# Patient Record
Sex: FEMALE | Race: WHITE | NOT HISPANIC OR LATINO | Employment: FULL TIME | URBAN - METROPOLITAN AREA
[De-identification: names, ages, dates, MRNs, and addresses within clinical notes are randomized per-mention and may not be internally consistent; named-entity substitution may affect disease eponyms.]

---

## 2017-02-14 ENCOUNTER — APPOINTMENT (OUTPATIENT)
Dept: LAB | Facility: HOSPITAL | Age: 67
End: 2017-02-14

## 2017-02-14 ENCOUNTER — APPOINTMENT (OUTPATIENT)
Dept: ONCOLOGY | Facility: CLINIC | Age: 67
End: 2017-02-14

## 2017-02-17 ENCOUNTER — LAB (OUTPATIENT)
Dept: LAB | Facility: HOSPITAL | Age: 67
End: 2017-02-17

## 2017-02-17 ENCOUNTER — OFFICE VISIT (OUTPATIENT)
Dept: ONCOLOGY | Facility: CLINIC | Age: 67
End: 2017-02-17

## 2017-02-17 VITALS
HEIGHT: 61 IN | SYSTOLIC BLOOD PRESSURE: 140 MMHG | OXYGEN SATURATION: 95 % | HEART RATE: 100 BPM | WEIGHT: 189.8 LBS | RESPIRATION RATE: 16 BRPM | BODY MASS INDEX: 35.83 KG/M2 | DIASTOLIC BLOOD PRESSURE: 70 MMHG | TEMPERATURE: 98.1 F

## 2017-02-17 DIAGNOSIS — F32.9 REACTIVE DEPRESSION: ICD-10-CM

## 2017-02-17 DIAGNOSIS — C50.512 MALIGNANT NEOPLASM OF LOWER-OUTER QUADRANT OF LEFT FEMALE BREAST (HCC): ICD-10-CM

## 2017-02-17 DIAGNOSIS — C79.2 CARCINOMA OF LEFT BREAST METASTATIC TO SKIN (HCC): ICD-10-CM

## 2017-02-17 DIAGNOSIS — C50.912 CARCINOMA OF LEFT BREAST METASTATIC TO SKIN (HCC): ICD-10-CM

## 2017-02-17 DIAGNOSIS — C50.912 CARCINOMA OF LEFT BREAST METASTATIC TO SKIN (HCC): Primary | ICD-10-CM

## 2017-02-17 DIAGNOSIS — C79.2 CARCINOMA OF LEFT BREAST METASTATIC TO SKIN (HCC): Primary | ICD-10-CM

## 2017-02-17 LAB
ALBUMIN SERPL-MCNC: 3.9 G/DL (ref 3.5–5.2)
ALBUMIN/GLOB SERPL: 1.3 G/DL (ref 1.1–2.4)
ALP SERPL-CCNC: 98 U/L (ref 38–116)
ALT SERPL W P-5'-P-CCNC: 20 U/L (ref 0–33)
ANION GAP SERPL CALCULATED.3IONS-SCNC: 12.9 MMOL/L
AST SERPL-CCNC: 21 U/L (ref 0–32)
BASOPHILS # BLD AUTO: 0.05 10*3/MM3 (ref 0–0.1)
BASOPHILS NFR BLD AUTO: 0.9 % (ref 0–1.1)
BILIRUB SERPL-MCNC: 0.3 MG/DL (ref 0.1–1.2)
BUN BLD-MCNC: 17 MG/DL (ref 6–20)
BUN/CREAT SERPL: 23.9 (ref 7.3–30)
CALCIUM SPEC-SCNC: 9.1 MG/DL (ref 8.5–10.2)
CANCER AG15-3 SERPL-ACNC: 20.1 U/ML
CHLORIDE SERPL-SCNC: 98 MMOL/L (ref 98–107)
CO2 SERPL-SCNC: 28.1 MMOL/L (ref 22–29)
CREAT BLD-MCNC: 0.71 MG/DL (ref 0.6–1.1)
DEPRECATED RDW RBC AUTO: 42.9 FL (ref 37–49)
EOSINOPHIL # BLD AUTO: 0.09 10*3/MM3 (ref 0–0.36)
EOSINOPHIL NFR BLD AUTO: 1.7 % (ref 1–5)
ERYTHROCYTE [DISTWIDTH] IN BLOOD BY AUTOMATED COUNT: 13.1 % (ref 11.7–14.5)
GFR SERPL CREATININE-BSD FRML MDRD: 82 ML/MIN/1.73
GLOBULIN UR ELPH-MCNC: 3.1 GM/DL (ref 1.8–3.5)
GLUCOSE BLD-MCNC: 267 MG/DL (ref 74–124)
HCT VFR BLD AUTO: 37.1 % (ref 34–45)
HGB BLD-MCNC: 12.6 G/DL (ref 11.5–14.9)
IMM GRANULOCYTES # BLD: 0.02 10*3/MM3 (ref 0–0.03)
IMM GRANULOCYTES NFR BLD: 0.4 % (ref 0–0.5)
LYMPHOCYTES # BLD AUTO: 1.11 10*3/MM3 (ref 1–3.5)
LYMPHOCYTES NFR BLD AUTO: 20.7 % (ref 20–49)
MCH RBC QN AUTO: 30.7 PG (ref 27–33)
MCHC RBC AUTO-ENTMCNC: 34 G/DL (ref 32–35)
MCV RBC AUTO: 90.5 FL (ref 83–97)
MONOCYTES # BLD AUTO: 0.4 10*3/MM3 (ref 0.25–0.8)
MONOCYTES NFR BLD AUTO: 7.5 % (ref 4–12)
NEUTROPHILS # BLD AUTO: 3.69 10*3/MM3 (ref 1.5–7)
NEUTROPHILS NFR BLD AUTO: 68.8 % (ref 39–75)
NRBC BLD MANUAL-RTO: 0 /100 WBC (ref 0–0)
PLATELET # BLD AUTO: 171 10*3/MM3 (ref 150–375)
PMV BLD AUTO: 9.4 FL (ref 8.9–12.1)
POTASSIUM BLD-SCNC: 4 MMOL/L (ref 3.5–4.7)
PROT SERPL-MCNC: 7 G/DL (ref 6.3–8)
RBC # BLD AUTO: 4.1 10*6/MM3 (ref 3.9–5)
SODIUM BLD-SCNC: 139 MMOL/L (ref 134–145)
WBC NRBC COR # BLD: 5.36 10*3/MM3 (ref 4–10)

## 2017-02-17 PROCEDURE — 86300 IMMUNOASSAY TUMOR CA 15-3: CPT | Performed by: INTERNAL MEDICINE

## 2017-02-17 PROCEDURE — 36415 COLL VENOUS BLD VENIPUNCTURE: CPT | Performed by: INTERNAL MEDICINE

## 2017-02-17 PROCEDURE — 99213 OFFICE O/P EST LOW 20 MIN: CPT | Performed by: INTERNAL MEDICINE

## 2017-02-17 PROCEDURE — 80053 COMPREHEN METABOLIC PANEL: CPT | Performed by: INTERNAL MEDICINE

## 2017-02-17 PROCEDURE — 85025 COMPLETE CBC W/AUTO DIFF WBC: CPT | Performed by: INTERNAL MEDICINE

## 2017-02-17 NOTE — PROGRESS NOTES
Subjective     REASONS FOR FOLLOWUP:   1. Chest wall recurrence of breast cancer, ER/OK positive, HER-2 negative, grade 2 with no evidence of distant spread. Arimidex started in 2014.   2. T1cN0, grade 1, ER/OK positive breast cancer treated with CMF and 5 years of tamoxifen in .   3. Moderate fatigue from Arimidex.   4. Response clinically and by CT scan on 2015.   5. Faslodex added to Arimidex in 2015 to optimize response for surgery.   6. Decision made to forego surgery and do radiation, continue Arimidex in 2015.        History of Present Illness  patient is a 65-year-old lady with a locally recurrent breast cancer 19 years from diagnosis currently on Arimidex with fair tolerance.  She completed radiation a little over 10 months ago and she is here today for follow-up.  She is exercising and dieting and her depression and attitude are much better.  She is tolerating Arimidex without any problems.   Her tumor markers remain normal except for mild thrombocytopenia she has no lab abnormalities currently.  She has no cough or symptoms from her radiation scarring in the left lung apex.    PAST MEDICAL HISTORY: Significant for diabetes type 2, hypercholesterolemia and peripheral mariaelena ropathy from her diabetes for which he is on very low-dose Neurontin. She has had no heart attack, strokes or blood clots, no peptic ulcer disease, infectious mononucleosis, hepatitis, thyroid problems, or anemia.   PAST SURGICAL HISTORY: None except left breast mastectomy .     OB/GYN HISTORY: Menarche was at age 12, menopause in her late 40s induced by CMS chemotherapy. She is  3, para 2 with one miscarriage. First childbirth was at age 25. She did not breast-feed. Never took hormonal replacement.     ONCOLOGIC HISTORY: History of previous dates can be found in a separate document.   The patient was seen on 2015 after getting a 2nd opinion of Dr. Louis Brooks and seeing her surgeon in [place]  again. Her circulating tumor cells were present and we felt this argued against doing surgery and we opted to do radiation to the chest wall. Continue Arimidex for the time being and for progression switch to Faslodex and Ibrance. CT scans of the chest, abdomen, pelvis, and bone scan dated 10/07/2015 showed no evidence of systemic disease and just chest wall disease, which has all respon ded to her hormonal therapy and fatty liver.  Patient seen on 2016 after radiation with a PET scan that shows some radiation lung damage which is PET positive with a low SUV and no other signs of metastatic disease.  Arimidex continued    SOCIAL HISTORY: She is . 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 She is a nondrinker.     FAMILY HISTORY: Father  at 59 o f an MI. Mother at 68 of lung cancer and was a smoker. She has paternal grandmother with breast cancer at age 47, a paternal aunt with breast cancer at age 59 and pancreatic cancer at 88. She has a paternal first cousin with breast cancer at 44, another p a ternal cousin with breast cancer at 44, another cousin with breast cancer at 61, and another paternal first cousin with breast cancer at 66. BRCA testing was done on the 44-year-old cousin and was negative. No history of ovarian cancer. She has a paterna l first cousin with colon cancer.     Review of Systems   Constitutional: Positive for fatigue.   Neurological: Positive for numbness.   All other systems reviewed and are negative.     A comprehensive 14 point review of systems was performed and was negative except as mentioned.    Current Outpatient Prescriptions on File Prior to Visit   Medication Sig Dispense Refill   • anastrozole (ARIMIDEX) 1 MG tablet Take 1 tablet by mouth Daily. 90 tablet 2   • aspirin 81 MG chewable tablet Chew 81 mg daily.     • gabapentin (NEURONTIN) 100 MG capsule Take 100 mg by mouth every day.     • lisinopril (PRINIVIL,ZESTRIL) 10 MG  "tablet      • metFORMIN (GLUCOPHAGE) 500 MG tablet Take 500 mg by mouth 2 (two) times a day with meals.     • rosuvastatin (CRESTOR) 20 MG tablet Take 20 mg by mouth daily.       No current facility-administered medications on file prior to visit.          ALLERGIES:  No Known Allergies    Objective      Vitals:    02/17/17 1448   BP: 140/70   Pulse: 100   Resp: 16   Temp: 98.1 °F (36.7 °C)   TempSrc: Oral   SpO2: 95%   Weight: 189 lb 12.8 oz (86.1 kg)   Height: 61.42\" (156 cm)   PainSc: 0-No pain     Current Status 2/17/2017   ECOG score 0       Physical Exam    GENERAL:  Well-developed, well-nourished in no acute distress.   SKIN:  Warm, dry without rashes, purpura or petechiae.  HEAD:  Normocephalic.  EYES:  Pupils equal, round and reactive to light.  EOMs intact.  Conjunctivae normal.  EARS:  Hearing intact.  NOSE:  Septum midline.  No excoriations or nasal discharge.  MOUTH:  Tongue is well-papillated; no stomatitis or ulcers.  Lips normal.  THROAT:  Oropharynx without lesions or exudates.  NECK:  Supple with good range of motion; no thyromegaly or masses, no JVD.  LYMPHATICS:  No cervical, supraclavicular, axillary or inguinal adenopathy.  CHEST:  Lungs clear to percussion and auscultation. Good airflow.  BREASTS: Right breast with an inverted nipple and no masses-left chest wall with significant radiation skin changes and no residual masses appreciated  CARDIAC:  Regular rate and rhythm without murmurs, rubs or gallops. Normal S1,S2.  ABDOMEN:  Soft, nontender with no organomegaly or masses.  EXTREMITIES:  No clubbing, cyanosis or edema.  NEUROLOGICAL:  Cranial Nerves II-XII grossly intact.  No focal neurological deficits.  PSYCHIATRIC:  Normal affect and mood.        RECENT LABS:  Hematology WBC   Date Value Ref Range Status   02/17/2017 5.36 4.00 - 10.00 10*3/mm3 Final     RBC   Date Value Ref Range Status   02/17/2017 4.10 3.90 - 5.00 10*6/mm3 Final     HEMOGLOBIN   Date Value Ref Range Status   02/17/2017 " 12.6 11.5 - 14.9 g/dL Final     HEMATOCRIT   Date Value Ref Range Status   02/17/2017 37.1 34.0 - 45.0 % Final     PLATELETS   Date Value Ref Range Status   02/17/2017 171 150 - 375 10*3/mm3 Final      C  Assessment/Plan   1.  Recurrent breast cancer to the left chest wall only-treated with aromatase inhibitors plus radiation with a negative PET scan in 6/16  2.  Anxiety and depression improved  3.  Significant weight gain with hormonal blockade currently improving with diet and exercise  4   elevated circulating tumor cells significance not known  5.  Numbness in the left upper arm and intrascapular regions?  Related to radiation-better    Plan  Return in 3 months with CAT scans and a bone scan and CA-15-3 for review                2/17/2017      CC:

## 2017-04-12 ENCOUNTER — OFFICE (AMBULATORY)
Dept: URBAN - METROPOLITAN AREA CLINIC 75 | Facility: CLINIC | Age: 67
End: 2017-04-12

## 2017-04-12 VITALS
WEIGHT: 188 LBS | DIASTOLIC BLOOD PRESSURE: 94 MMHG | HEART RATE: 91 BPM | HEIGHT: 62 IN | SYSTOLIC BLOOD PRESSURE: 162 MMHG

## 2017-04-12 DIAGNOSIS — R19.5 OTHER FECAL ABNORMALITIES: ICD-10-CM

## 2017-04-12 DIAGNOSIS — Z86.010 PERSONAL HISTORY OF COLONIC POLYPS: ICD-10-CM

## 2017-04-12 PROCEDURE — 99203 OFFICE O/P NEW LOW 30 MIN: CPT | Performed by: INTERNAL MEDICINE

## 2017-05-01 VITALS
SYSTOLIC BLOOD PRESSURE: 156 MMHG | TEMPERATURE: 97.4 F | RESPIRATION RATE: 20 BRPM | HEART RATE: 78 BPM | HEART RATE: 73 BPM | SYSTOLIC BLOOD PRESSURE: 130 MMHG | HEART RATE: 77 BPM | DIASTOLIC BLOOD PRESSURE: 67 MMHG | HEART RATE: 74 BPM | DIASTOLIC BLOOD PRESSURE: 69 MMHG | SYSTOLIC BLOOD PRESSURE: 150 MMHG | HEART RATE: 80 BPM | RESPIRATION RATE: 13 BRPM | OXYGEN SATURATION: 95 % | DIASTOLIC BLOOD PRESSURE: 62 MMHG | RESPIRATION RATE: 11 BRPM | SYSTOLIC BLOOD PRESSURE: 164 MMHG | SYSTOLIC BLOOD PRESSURE: 135 MMHG | RESPIRATION RATE: 14 BRPM | OXYGEN SATURATION: 96 % | RESPIRATION RATE: 12 BRPM | DIASTOLIC BLOOD PRESSURE: 64 MMHG | RESPIRATION RATE: 17 BRPM | RESPIRATION RATE: 16 BRPM | RESPIRATION RATE: 25 BRPM | DIASTOLIC BLOOD PRESSURE: 76 MMHG | OXYGEN SATURATION: 99 % | DIASTOLIC BLOOD PRESSURE: 66 MMHG | DIASTOLIC BLOOD PRESSURE: 61 MMHG | DIASTOLIC BLOOD PRESSURE: 92 MMHG | DIASTOLIC BLOOD PRESSURE: 68 MMHG | HEART RATE: 83 BPM | WEIGHT: 184 LBS | DIASTOLIC BLOOD PRESSURE: 99 MMHG | SYSTOLIC BLOOD PRESSURE: 137 MMHG | RESPIRATION RATE: 15 BRPM | HEIGHT: 62 IN | OXYGEN SATURATION: 94 % | OXYGEN SATURATION: 97 % | SYSTOLIC BLOOD PRESSURE: 163 MMHG | HEART RATE: 79 BPM | HEART RATE: 76 BPM | SYSTOLIC BLOOD PRESSURE: 138 MMHG | HEART RATE: 68 BPM | SYSTOLIC BLOOD PRESSURE: 162 MMHG | SYSTOLIC BLOOD PRESSURE: 149 MMHG | SYSTOLIC BLOOD PRESSURE: 157 MMHG | HEART RATE: 71 BPM | HEART RATE: 69 BPM | DIASTOLIC BLOOD PRESSURE: 70 MMHG | SYSTOLIC BLOOD PRESSURE: 131 MMHG | TEMPERATURE: 98 F

## 2017-05-02 ENCOUNTER — OFFICE (AMBULATORY)
Dept: URBAN - METROPOLITAN AREA CLINIC 64 | Facility: CLINIC | Age: 67
End: 2017-05-02

## 2017-05-02 ENCOUNTER — AMBULATORY SURGICAL CENTER (AMBULATORY)
Dept: URBAN - METROPOLITAN AREA SURGERY 17 | Facility: SURGERY | Age: 67
End: 2017-05-02

## 2017-05-02 ENCOUNTER — TRANSCRIBE ORDERS (OUTPATIENT)
Dept: ADMINISTRATIVE | Facility: HOSPITAL | Age: 67
End: 2017-05-02

## 2017-05-02 DIAGNOSIS — D12.5 BENIGN NEOPLASM OF SIGMOID COLON: ICD-10-CM

## 2017-05-02 DIAGNOSIS — K64.8 OTHER HEMORRHOIDS: ICD-10-CM

## 2017-05-02 DIAGNOSIS — D12.4 BENIGN NEOPLASM OF DESCENDING COLON: ICD-10-CM

## 2017-05-02 DIAGNOSIS — K63.5 POLYP OF COLON: ICD-10-CM

## 2017-05-02 DIAGNOSIS — D12.0 BENIGN NEOPLASM OF CECUM: ICD-10-CM

## 2017-05-02 DIAGNOSIS — R19.5 OTHER FECAL ABNORMALITIES: ICD-10-CM

## 2017-05-02 DIAGNOSIS — R19.5 OCCULT BLOOD POSITIVE STOOL: Primary | ICD-10-CM

## 2017-05-02 DIAGNOSIS — K57.30 DIVERTICULOSIS OF LARGE INTESTINE WITHOUT PERFORATION OR ABS: ICD-10-CM

## 2017-05-02 DIAGNOSIS — Z86.010 PERSONAL HISTORY OF COLONIC POLYPS: ICD-10-CM

## 2017-05-02 DIAGNOSIS — Z86.010 HX OF COLONIC POLYPS: ICD-10-CM

## 2017-05-02 PROBLEM — D12.3 BENIGN NEOPLASM OF TRANSVERSE COLON: Status: ACTIVE | Noted: 2017-05-02

## 2017-05-02 LAB
GI HISTOLOGY: A. UNSPECIFIED: (no result)
GI HISTOLOGY: B. UNSPECIFIED: (no result)
GI HISTOLOGY: C. SELECT: (no result)
GI HISTOLOGY: D. SELECT: (no result)
GI HISTOLOGY: PDF REPORT: (no result)

## 2017-05-02 PROCEDURE — 45385 COLONOSCOPY W/LESION REMOVAL: CPT | Performed by: INTERNAL MEDICINE

## 2017-05-02 PROCEDURE — 88305 TISSUE EXAM BY PATHOLOGIST: CPT | Performed by: INTERNAL MEDICINE

## 2017-05-02 RX ADMIN — PROPOFOL 50 MG: 10 INJECTION, EMULSION INTRAVENOUS at 07:57

## 2017-05-02 RX ADMIN — PROPOFOL 25 MG: 10 INJECTION, EMULSION INTRAVENOUS at 08:14

## 2017-05-02 RX ADMIN — PROPOFOL 25 MG: 10 INJECTION, EMULSION INTRAVENOUS at 08:03

## 2017-05-02 RX ADMIN — PROPOFOL 25 MG: 10 INJECTION, EMULSION INTRAVENOUS at 08:07

## 2017-05-02 RX ADMIN — PROPOFOL 25 MG: 10 INJECTION, EMULSION INTRAVENOUS at 08:05

## 2017-05-02 RX ADMIN — PROPOFOL 100 MG: 10 INJECTION, EMULSION INTRAVENOUS at 07:55

## 2017-05-02 RX ADMIN — LIDOCAINE HYDROCHLORIDE 50 MG: 10 INJECTION, SOLUTION EPIDURAL; INFILTRATION; INTRACAUDAL; PERINEURAL at 07:55

## 2017-05-02 RX ADMIN — PROPOFOL 25 MG: 10 INJECTION, EMULSION INTRAVENOUS at 08:01

## 2017-05-02 RX ADMIN — PROPOFOL 50 MG: 10 INJECTION, EMULSION INTRAVENOUS at 07:59

## 2017-05-02 RX ADMIN — PROPOFOL 25 MG: 10 INJECTION, EMULSION INTRAVENOUS at 08:09

## 2017-05-02 RX ADMIN — PROPOFOL 25 MG: 10 INJECTION, EMULSION INTRAVENOUS at 08:11

## 2017-05-03 ENCOUNTER — HOSPITAL ENCOUNTER (OUTPATIENT)
Dept: NUCLEAR MEDICINE | Facility: HOSPITAL | Age: 67
Discharge: HOME | End: 2017-05-03
Attending: INTERNAL MEDICINE

## 2017-05-03 ENCOUNTER — HOSPITAL ENCOUNTER (OUTPATIENT)
Dept: CT IMAGING | Facility: HOSPITAL | Age: 67
Discharge: HOME | End: 2017-05-03
Attending: INTERNAL MEDICINE | Admitting: INTERNAL MEDICINE

## 2017-05-03 DIAGNOSIS — C50.512 MALIGNANT NEOPLASM OF LOWER-OUTER QUADRANT OF LEFT FEMALE BREAST (HCC): ICD-10-CM

## 2017-05-03 DIAGNOSIS — C79.2 CARCINOMA OF LEFT BREAST METASTATIC TO SKIN (HCC): ICD-10-CM

## 2017-05-03 DIAGNOSIS — C50.912 CARCINOMA OF LEFT BREAST METASTATIC TO SKIN (HCC): ICD-10-CM

## 2017-05-03 DIAGNOSIS — F32.9 REACTIVE DEPRESSION: ICD-10-CM

## 2017-05-03 LAB
ALBUMIN SERPL-MCNC: 4 G/DL (ref 3.5–5.2)
ALBUMIN/GLOB SERPL: 1.1 G/DL
ALP SERPL-CCNC: 96 U/L (ref 39–117)
ALT SERPL W P-5'-P-CCNC: 27 U/L (ref 1–33)
ANION GAP SERPL CALCULATED.3IONS-SCNC: 15.6 MMOL/L
AST SERPL-CCNC: 31 U/L (ref 1–32)
BILIRUB SERPL-MCNC: 0.3 MG/DL (ref 0.1–1.2)
BUN BLD-MCNC: 16 MG/DL (ref 8–23)
BUN/CREAT SERPL: 27.6 (ref 7–25)
CALCIUM SPEC-SCNC: 9.2 MG/DL (ref 8.6–10.5)
CHLORIDE SERPL-SCNC: 102 MMOL/L (ref 98–107)
CO2 SERPL-SCNC: 23.4 MMOL/L (ref 22–29)
CREAT BLD-MCNC: 0.58 MG/DL (ref 0.57–1)
CREAT BLDA-MCNC: 0.6 MG/DL (ref 0.6–1.3)
GFR SERPL CREATININE-BSD FRML MDRD: 103 ML/MIN/1.73
GLOBULIN UR ELPH-MCNC: 3.8 GM/DL
GLUCOSE BLD-MCNC: 135 MG/DL (ref 65–99)
POTASSIUM BLD-SCNC: 4.6 MMOL/L (ref 3.5–5.2)
PROT SERPL-MCNC: 7.8 G/DL (ref 6–8.5)
SODIUM BLD-SCNC: 141 MMOL/L (ref 136–145)

## 2017-05-03 PROCEDURE — 86300 IMMUNOASSAY TUMOR CA 15-3: CPT | Performed by: INTERNAL MEDICINE

## 2017-05-03 PROCEDURE — 0 IOPAMIDOL 61 % SOLUTION: Performed by: INTERNAL MEDICINE

## 2017-05-03 PROCEDURE — A9503 TC99M MEDRONATE: HCPCS | Performed by: INTERNAL MEDICINE

## 2017-05-03 PROCEDURE — 82565 ASSAY OF CREATININE: CPT

## 2017-05-03 PROCEDURE — 78306 BONE IMAGING WHOLE BODY: CPT

## 2017-05-03 PROCEDURE — 0 TECHNETIUM MEDRONATE KIT: Performed by: INTERNAL MEDICINE

## 2017-05-03 PROCEDURE — 71260 CT THORAX DX C+: CPT

## 2017-05-03 PROCEDURE — 74177 CT ABD & PELVIS W/CONTRAST: CPT

## 2017-05-03 PROCEDURE — 80053 COMPREHEN METABOLIC PANEL: CPT | Performed by: INTERNAL MEDICINE

## 2017-05-03 RX ORDER — TC 99M MEDRONATE 20 MG/10ML
23.2 INJECTION, POWDER, LYOPHILIZED, FOR SOLUTION INTRAVENOUS
Status: COMPLETED | OUTPATIENT
Start: 2017-05-03 | End: 2017-05-03

## 2017-05-03 RX ADMIN — IOPAMIDOL 85 ML: 612 INJECTION, SOLUTION INTRAVENOUS at 09:00

## 2017-05-03 RX ADMIN — Medication 23.2 MILLICURIE: at 07:45

## 2017-05-04 LAB — CANCER AG15-3 SERPL-ACNC: 21.5 U/ML (ref 0–25)

## 2017-05-12 ENCOUNTER — APPOINTMENT (OUTPATIENT)
Dept: LAB | Facility: HOSPITAL | Age: 67
End: 2017-05-12

## 2017-05-12 ENCOUNTER — OFFICE VISIT (OUTPATIENT)
Dept: ONCOLOGY | Facility: CLINIC | Age: 67
End: 2017-05-12

## 2017-05-12 VITALS
BODY MASS INDEX: 36.63 KG/M2 | SYSTOLIC BLOOD PRESSURE: 138 MMHG | HEART RATE: 89 BPM | WEIGHT: 194 LBS | HEIGHT: 61 IN | TEMPERATURE: 98.8 F | DIASTOLIC BLOOD PRESSURE: 68 MMHG | RESPIRATION RATE: 12 BRPM | OXYGEN SATURATION: 98 %

## 2017-05-12 DIAGNOSIS — C79.2 CARCINOMA OF LEFT BREAST METASTATIC TO SKIN (HCC): ICD-10-CM

## 2017-05-12 DIAGNOSIS — C50.512 MALIGNANT NEOPLASM OF LOWER-OUTER QUADRANT OF LEFT FEMALE BREAST (HCC): ICD-10-CM

## 2017-05-12 DIAGNOSIS — F32.9 REACTIVE DEPRESSION: Primary | ICD-10-CM

## 2017-05-12 DIAGNOSIS — C50.912 CARCINOMA OF LEFT BREAST METASTATIC TO SKIN (HCC): ICD-10-CM

## 2017-05-12 PROCEDURE — 99214 OFFICE O/P EST MOD 30 MIN: CPT | Performed by: INTERNAL MEDICINE

## 2017-05-12 RX ORDER — LISINOPRIL 20 MG/1
TABLET ORAL
Refills: 2 | COMMUNITY
Start: 2017-03-31 | End: 2017-08-10

## 2017-05-17 ENCOUNTER — HOSPITAL ENCOUNTER (OUTPATIENT)
Dept: GENERAL RADIOLOGY | Facility: HOSPITAL | Age: 67
Discharge: HOME | End: 2017-05-17
Admitting: INTERNAL MEDICINE

## 2017-05-17 DIAGNOSIS — Z86.010 HX OF COLONIC POLYPS: ICD-10-CM

## 2017-05-17 DIAGNOSIS — R19.5 OCCULT BLOOD POSITIVE STOOL: ICD-10-CM

## 2017-05-17 PROCEDURE — 74249: CPT

## 2017-05-25 VITALS
OXYGEN SATURATION: 96 % | HEART RATE: 67 BPM | HEART RATE: 78 BPM | TEMPERATURE: 96.8 F | OXYGEN SATURATION: 100 % | RESPIRATION RATE: 15 BRPM | SYSTOLIC BLOOD PRESSURE: 142 MMHG | SYSTOLIC BLOOD PRESSURE: 184 MMHG | OXYGEN SATURATION: 92 % | OXYGEN SATURATION: 95 % | OXYGEN SATURATION: 97 % | HEART RATE: 65 BPM | HEART RATE: 80 BPM | RESPIRATION RATE: 14 BRPM | DIASTOLIC BLOOD PRESSURE: 68 MMHG | HEART RATE: 77 BPM | DIASTOLIC BLOOD PRESSURE: 89 MMHG | HEART RATE: 76 BPM | HEART RATE: 71 BPM | TEMPERATURE: 96.2 F | RESPIRATION RATE: 21 BRPM | SYSTOLIC BLOOD PRESSURE: 149 MMHG | DIASTOLIC BLOOD PRESSURE: 78 MMHG | SYSTOLIC BLOOD PRESSURE: 159 MMHG | RESPIRATION RATE: 16 BRPM | SYSTOLIC BLOOD PRESSURE: 154 MMHG | RESPIRATION RATE: 17 BRPM | DIASTOLIC BLOOD PRESSURE: 82 MMHG | SYSTOLIC BLOOD PRESSURE: 138 MMHG | WEIGHT: 184 LBS | OXYGEN SATURATION: 94 % | DIASTOLIC BLOOD PRESSURE: 71 MMHG | HEIGHT: 62 IN

## 2017-05-30 ENCOUNTER — AMBULATORY SURGICAL CENTER (AMBULATORY)
Dept: URBAN - METROPOLITAN AREA SURGERY 17 | Facility: SURGERY | Age: 67
End: 2017-05-30

## 2017-05-30 ENCOUNTER — OFFICE (AMBULATORY)
Dept: URBAN - METROPOLITAN AREA CLINIC 64 | Facility: CLINIC | Age: 67
End: 2017-05-30

## 2017-05-30 DIAGNOSIS — K29.70 GASTRITIS, UNSPECIFIED, WITHOUT BLEEDING: ICD-10-CM

## 2017-05-30 DIAGNOSIS — K29.50 UNSPECIFIED CHRONIC GASTRITIS WITHOUT BLEEDING: ICD-10-CM

## 2017-05-30 DIAGNOSIS — B96.81 HELICOBACTER PYLORI [H. PYLORI] AS THE CAUSE OF DISEASES CLA: ICD-10-CM

## 2017-05-30 DIAGNOSIS — K29.80 DUODENITIS WITHOUT BLEEDING: ICD-10-CM

## 2017-05-30 DIAGNOSIS — K44.9 DIAPHRAGMATIC HERNIA WITHOUT OBSTRUCTION OR GANGRENE: ICD-10-CM

## 2017-05-30 DIAGNOSIS — R93.3 ABNORMAL FINDINGS ON DIAGNOSTIC IMAGING OF OTHER PARTS OF DI: ICD-10-CM

## 2017-05-30 LAB
GI HISTOLOGY: A. UNSPECIFIED: (no result)
GI HISTOLOGY: B. UNSPECIFIED: (no result)
GI HISTOLOGY: C. UNSPECIFIED: (no result)
GI HISTOLOGY: D. SELECT: (no result)
GI HISTOLOGY: E. UNSPECIFIED: (no result)
GI HISTOLOGY: PDF REPORT: (no result)

## 2017-05-30 PROCEDURE — 43239 EGD BIOPSY SINGLE/MULTIPLE: CPT | Performed by: INTERNAL MEDICINE

## 2017-05-30 PROCEDURE — 88305 TISSUE EXAM BY PATHOLOGIST: CPT | Performed by: INTERNAL MEDICINE

## 2017-05-30 RX ORDER — PANTOPRAZOLE SODIUM 40 MG/1
40 TABLET, DELAYED RELEASE ORAL
Qty: 30 | Refills: 11 | Status: ACTIVE
Start: 2017-05-30

## 2017-05-30 RX ADMIN — PROPOFOL 100 MG: 10 INJECTION, EMULSION INTRAVENOUS at 09:01

## 2017-05-30 RX ADMIN — PROPOFOL 25 MG: 10 INJECTION, EMULSION INTRAVENOUS at 09:10

## 2017-05-30 RX ADMIN — PROPOFOL 50 MG: 10 INJECTION, EMULSION INTRAVENOUS at 09:06

## 2017-05-30 RX ADMIN — LIDOCAINE HYDROCHLORIDE 50 MG: 10 INJECTION, SOLUTION EPIDURAL; INFILTRATION; INTRACAUDAL; PERINEURAL at 09:01

## 2017-05-30 RX ADMIN — PROPOFOL 50 MG: 10 INJECTION, EMULSION INTRAVENOUS at 09:03

## 2017-08-10 ENCOUNTER — LAB (OUTPATIENT)
Dept: LAB | Facility: HOSPITAL | Age: 67
End: 2017-08-10

## 2017-08-10 ENCOUNTER — OFFICE VISIT (OUTPATIENT)
Dept: ONCOLOGY | Facility: CLINIC | Age: 67
End: 2017-08-10

## 2017-08-10 VITALS
SYSTOLIC BLOOD PRESSURE: 136 MMHG | DIASTOLIC BLOOD PRESSURE: 72 MMHG | WEIGHT: 189.6 LBS | HEART RATE: 81 BPM | BODY MASS INDEX: 35.8 KG/M2 | TEMPERATURE: 98.1 F | OXYGEN SATURATION: 97 % | HEIGHT: 61 IN | RESPIRATION RATE: 16 BRPM

## 2017-08-10 DIAGNOSIS — C79.2 CARCINOMA OF LEFT BREAST METASTATIC TO SKIN (HCC): ICD-10-CM

## 2017-08-10 DIAGNOSIS — C50.912 CARCINOMA OF LEFT BREAST METASTATIC TO SKIN (HCC): ICD-10-CM

## 2017-08-10 DIAGNOSIS — C50.512 MALIGNANT NEOPLASM OF LOWER-OUTER QUADRANT OF LEFT FEMALE BREAST (HCC): Primary | ICD-10-CM

## 2017-08-10 DIAGNOSIS — F32.9 REACTIVE DEPRESSION: ICD-10-CM

## 2017-08-10 LAB
ALBUMIN SERPL-MCNC: 4.3 G/DL (ref 3.5–5.2)
ALBUMIN/GLOB SERPL: 1.2 G/DL (ref 1.1–2.4)
ALP SERPL-CCNC: 83 U/L (ref 38–116)
ALT SERPL W P-5'-P-CCNC: 21 U/L (ref 0–33)
ANION GAP SERPL CALCULATED.3IONS-SCNC: 15 MMOL/L
AST SERPL-CCNC: 33 U/L (ref 0–32)
BASOPHILS # BLD AUTO: 0.03 10*3/MM3 (ref 0–0.1)
BASOPHILS NFR BLD AUTO: 0.6 % (ref 0–1.1)
BILIRUB SERPL-MCNC: 0.4 MG/DL (ref 0.1–1.2)
BUN BLD-MCNC: 21 MG/DL (ref 6–20)
BUN/CREAT SERPL: 25.3 (ref 7.3–30)
CALCIUM SPEC-SCNC: 9.6 MG/DL (ref 8.5–10.2)
CANCER AG15-3 SERPL-ACNC: 18.4 U/ML
CHLORIDE SERPL-SCNC: 96 MMOL/L (ref 98–107)
CO2 SERPL-SCNC: 28 MMOL/L (ref 22–29)
CREAT BLD-MCNC: 0.83 MG/DL (ref 0.6–1.1)
DEPRECATED RDW RBC AUTO: 45.2 FL (ref 37–49)
EOSINOPHIL # BLD AUTO: 0.13 10*3/MM3 (ref 0–0.36)
EOSINOPHIL NFR BLD AUTO: 2.4 % (ref 1–5)
ERYTHROCYTE [DISTWIDTH] IN BLOOD BY AUTOMATED COUNT: 13.2 % (ref 11.7–14.5)
GFR SERPL CREATININE-BSD FRML MDRD: 68 ML/MIN/1.73
GLOBULIN UR ELPH-MCNC: 3.5 GM/DL (ref 1.8–3.5)
GLUCOSE BLD-MCNC: 113 MG/DL (ref 74–124)
HCT VFR BLD AUTO: 39.2 % (ref 34–45)
HGB BLD-MCNC: 13 G/DL (ref 11.5–14.9)
HOLD SPECIMEN: NORMAL
IMM GRANULOCYTES # BLD: 0.02 10*3/MM3 (ref 0–0.03)
IMM GRANULOCYTES NFR BLD: 0.4 % (ref 0–0.5)
LYMPHOCYTES # BLD AUTO: 1.08 10*3/MM3 (ref 1–3.5)
LYMPHOCYTES NFR BLD AUTO: 20.1 % (ref 20–49)
MCH RBC QN AUTO: 31.1 PG (ref 27–33)
MCHC RBC AUTO-ENTMCNC: 33.2 G/DL (ref 32–35)
MCV RBC AUTO: 93.8 FL (ref 83–97)
MONOCYTES # BLD AUTO: 0.51 10*3/MM3 (ref 0.25–0.8)
MONOCYTES NFR BLD AUTO: 9.5 % (ref 4–12)
NEUTROPHILS # BLD AUTO: 3.61 10*3/MM3 (ref 1.5–7)
NEUTROPHILS NFR BLD AUTO: 67 % (ref 39–75)
NRBC BLD MANUAL-RTO: 0 /100 WBC (ref 0–0)
PLATELET # BLD AUTO: 154 10*3/MM3 (ref 150–375)
PMV BLD AUTO: 9.4 FL (ref 8.9–12.1)
POTASSIUM BLD-SCNC: 4 MMOL/L (ref 3.5–4.7)
PROT SERPL-MCNC: 7.8 G/DL (ref 6.3–8)
RBC # BLD AUTO: 4.18 10*6/MM3 (ref 3.9–5)
SODIUM BLD-SCNC: 139 MMOL/L (ref 134–145)
WBC NRBC COR # BLD: 5.38 10*3/MM3 (ref 4–10)

## 2017-08-10 PROCEDURE — 36415 COLL VENOUS BLD VENIPUNCTURE: CPT | Performed by: INTERNAL MEDICINE

## 2017-08-10 PROCEDURE — 85025 COMPLETE CBC W/AUTO DIFF WBC: CPT | Performed by: INTERNAL MEDICINE

## 2017-08-10 PROCEDURE — 86300 IMMUNOASSAY TUMOR CA 15-3: CPT | Performed by: INTERNAL MEDICINE

## 2017-08-10 PROCEDURE — 99213 OFFICE O/P EST LOW 20 MIN: CPT | Performed by: INTERNAL MEDICINE

## 2017-08-10 PROCEDURE — 80053 COMPREHEN METABOLIC PANEL: CPT | Performed by: INTERNAL MEDICINE

## 2017-08-10 RX ORDER — HYDROCHLOROTHIAZIDE 25 MG/1
TABLET ORAL
Refills: 1 | COMMUNITY
Start: 2017-06-22 | End: 2020-01-08

## 2017-08-10 RX ORDER — LISINOPRIL 40 MG/1
TABLET ORAL
Refills: 1 | COMMUNITY
Start: 2017-07-14 | End: 2023-01-06

## 2017-08-10 RX ORDER — PANTOPRAZOLE SODIUM 40 MG/1
TABLET, DELAYED RELEASE ORAL
Refills: 11 | COMMUNITY
Start: 2017-05-31 | End: 2018-03-23

## 2017-08-10 NOTE — PROGRESS NOTES
Subjective     REASONS FOR FOLLOWUP:   1. Chest wall recurrence of breast cancer, ER/CA positive, HER-2 negative, grade 2 with no evidence of distant spread. Arimidex started in 2014.   2. T1cN0, grade 1, ER/CA positive breast cancer treated with CMF and 5 years of tamoxifen in .   3. Moderate fatigue from Arimidex.   4. Response clinically and by CT scan on 2015.   5. Faslodex added to Arimidex in 2015 to optimize response for surgery.   6. Decision made to forego surgery and do radiation, continue Arimidex in 2015.        History of Present Illness  patient is a 65-year-old lady with a locally recurrent breast cancer 19 years from diagnosis currently on Arimidex with fair tolerance.  S  She is exercising and dieting and her depression and attitude are much better.  She is tolerating Arimidex without any problems.   Her tumor markers remain normal except for mild thrombocytopenia she has no lab abnormalities currently.  She has no cough or symptoms from her radiation scarring in the left lung apex.    Her stool sample showed fecal blood and she had a colonoscopy with 3 adenomatous polyps removed - EGD was done and showed H. pylori infection and she was treated for this.  She is at the [facility] exercising with Silver sneakers and losing weight and overall has no complaints    PAST MEDICAL HISTORY: Significant for diabetes type 2, hypercholesterolemia and peripheral mariaelena ropathy from her diabetes for which he is on very low-dose Neurontin. She has had no heart attack, strokes or blood clots, no peptic ulcer disease, infectious mononucleosis, hepatitis, thyroid problems, or anemia.   PAST SURGICAL HISTORY: None except left breast mastectomy .     OB/GYN HISTORY: Menarche was at age 12, menopause in her late 40s induced by CMS chemotherapy. She is  3, para 2 with one miscarriage. First childbirth was at age 25. She did not breast-feed. Never took hormonal replacement.     ONCOLOGIC HISTORY:  History of previous dates can be found in a separate document.   The patient was seen on 2015 after getting a 2nd opinion of Dr. Louis Brooks and seeing her surgeon in [place] again. Her circulating tumor cells were present and we felt this argued against doing surgery and we opted to do radiation to the chest wall. Continue Arimidex for the time being and for progression switch to Faslodex and Ibrance. CT scans of the chest, abdomen, pelvis, and bone scan dated 10/07/2015 showed no evidence of systemic disease and just chest wall disease, which has all respon ded to her hormonal therapy and fatty liver.  Patient seen on 2016 after radiation with a PET scan that shows some radiation lung damage which is PET positive with a low SUV and no other signs of metastatic disease.  Arimidex continued    SOCIAL HISTORY: She is . 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 She is a nondrinker.     FAMILY HISTORY: Father  at 59 o f an MI. Mother at 68 of lung cancer and was a smoker. She has paternal grandmother with breast cancer at age 47, a paternal aunt with breast cancer at age 59 and pancreatic cancer at 88. She has a paternal first cousin with breast cancer at 44, another p a ternal cousin with breast cancer at 44, another cousin with breast cancer at 61, and another paternal first cousin with breast cancer at 66. BRCA testing was done on the 44-year-old cousin and was negative. No history of ovarian cancer. She has a paterna l first cousin with colon cancer.     Review of Systems   Constitutional: Positive for fatigue.   Neurological: Positive for numbness.   All other systems reviewed and are negative.     A comprehensive 14 point review of systems was performed and was negative except as mentioned.    Current Outpatient Prescriptions on File Prior to Visit   Medication Sig Dispense Refill   • anastrozole (ARIMIDEX) 1 MG tablet Take 1 tablet by mouth Daily.  "90 tablet 2   • aspirin 81 MG chewable tablet Chew 81 mg daily.     • Calcium Carbonate-Vitamin D (CALCIUM-D PO) Take  by mouth Daily.     • Cholecalciferol (VITAMIN D) 1000 UNITS tablet Take 2,000 Units by mouth.     • gabapentin (NEURONTIN) 100 MG capsule Take 100 mg by mouth every day.     • Probiotic Product (PROBIOTIC DAILY PO) Take  by mouth Daily.     • rosuvastatin (CRESTOR) 20 MG tablet Take 20 mg by mouth daily.     • TURMERIC PO Take  by mouth Daily.     • [DISCONTINUED] lisinopril (PRINIVIL,ZESTRIL) 20 MG tablet TAKE 1 TABLET BY MOUTH EVERY DAY FOR BLOOD PRESSURE  2   • [DISCONTINUED] metFORMIN (GLUCOPHAGE) 500 MG tablet Take 500 mg by mouth 2 (two) times a day with meals.       No current facility-administered medications on file prior to visit.          ALLERGIES:  No Known Allergies    Objective      Vitals:    08/10/17 1005   BP: 136/72   Pulse: 81   Resp: 16   Temp: 98.1 °F (36.7 °C)   TempSrc: Oral   SpO2: 97%   Weight: 189 lb 9.6 oz (86 kg)   Height: 61.42\" (156 cm)   PainSc: 0-No pain     Current Status 8/10/2017   ECOG score 0       Physical Exam    GENERAL:  Well-developed, well-nourished in no acute distress.   SKIN:  Warm, dry without rashes, purpura or petechiae.  HEAD:  Normocephalic.  EYES:  Pupils equal, round and reactive to light.  EOMs intact.  Conjunctivae normal.  EARS:  Hearing intact.  NOSE:  Septum midline.  No excoriations or nasal discharge.  MOUTH:  Tongue is well-papillated; no stomatitis or ulcers.  Lips normal.  THROAT:  Oropharynx without lesions or exudates.  NECK:  Supple with good range of motion; no thyromegaly or masses, no JVD.  LYMPHATICS:  No cervical, supraclavicular, axillary or inguinal adenopathy.  CHEST:  Lungs clear to percussion and auscultation. Good airflow.  BREASTS: Right breast with an inverted nipple and no masses-left chest wall with significant radiation skin changes and no residual masses appreciated  CARDIAC:  Regular rate and rhythm without " murmurs, rubs or gallops. Normal S1,S2.  ABDOMEN:  Soft, nontender with no organomegaly or masses.  EXTREMITIES:  No clubbing, cyanosis or edema.  NEUROLOGICAL:  Cranial Nerves II-XII grossly intact.  No focal neurological deficits.  PSYCHIATRIC:  Normal affect and mood.        RECENT LABS:  Hematology WBC   Date Value Ref Range Status   08/10/2017 5.38 4.00 - 10.00 10*3/mm3 Final     RBC   Date Value Ref Range Status   08/10/2017 4.18 3.90 - 5.00 10*6/mm3 Final     Hemoglobin   Date Value Ref Range Status   08/10/2017 13.0 11.5 - 14.9 g/dL Final     Hematocrit   Date Value Ref Range Status   08/10/2017 39.2 34.0 - 45.0 % Final     Platelets   Date Value Ref Range Status   08/10/2017 154 150 - 375 10*3/mm3 Final        CA-15-3= 18.4          Assessment/Plan   1.  Recurrent breast cancer to the left chest wall only-treated with aromatase inhibitors plus radiation with a negative PET scan in 6/16  2.  Anxiety and depression improved  3.  Significant weight gain with hormonal blockade currently improving with diet and exercise  4   elevated circulating tumor cells   5.  Numbness in the left upper arm and intrascapular regions?  Related to radiation-better    Plan  Return in 3 months with CA-15-3 for review                8/10/2017      CC:

## 2017-08-31 RX ORDER — ANASTROZOLE 1 MG/1
TABLET ORAL
Qty: 90 TABLET | Refills: 2 | Status: SHIPPED | OUTPATIENT
Start: 2017-08-31 | End: 2018-07-20 | Stop reason: SDUPTHER

## 2017-09-11 ENCOUNTER — OFFICE (AMBULATORY)
Dept: URBAN - METROPOLITAN AREA CLINIC 75 | Facility: CLINIC | Age: 67
End: 2017-09-11

## 2017-09-11 VITALS
DIASTOLIC BLOOD PRESSURE: 78 MMHG | HEIGHT: 62 IN | HEART RATE: 68 BPM | WEIGHT: 190 LBS | SYSTOLIC BLOOD PRESSURE: 122 MMHG

## 2017-09-11 DIAGNOSIS — R19.5 OTHER FECAL ABNORMALITIES: ICD-10-CM

## 2017-09-11 DIAGNOSIS — Z86.010 PERSONAL HISTORY OF COLONIC POLYPS: ICD-10-CM

## 2017-09-11 DIAGNOSIS — K29.70 GASTRITIS, UNSPECIFIED, WITHOUT BLEEDING: ICD-10-CM

## 2017-09-11 PROCEDURE — 99214 OFFICE O/P EST MOD 30 MIN: CPT | Performed by: INTERNAL MEDICINE

## 2017-11-09 ENCOUNTER — APPOINTMENT (OUTPATIENT)
Dept: WOMENS IMAGING | Facility: HOSPITAL | Age: 67
End: 2017-11-09

## 2017-11-09 PROCEDURE — 77063 BREAST TOMOSYNTHESIS BI: CPT | Performed by: RADIOLOGY

## 2017-11-09 PROCEDURE — MDREVIEWSP: Performed by: RADIOLOGY

## 2017-11-09 PROCEDURE — G0202 SCR MAMMO BI INCL CAD: HCPCS | Performed by: RADIOLOGY

## 2017-11-28 ENCOUNTER — LAB (OUTPATIENT)
Dept: LAB | Facility: HOSPITAL | Age: 67
End: 2017-11-28

## 2017-11-28 ENCOUNTER — OFFICE VISIT (OUTPATIENT)
Dept: ONCOLOGY | Facility: CLINIC | Age: 67
End: 2017-11-28

## 2017-11-28 VITALS
WEIGHT: 195.2 LBS | HEIGHT: 62 IN | HEART RATE: 80 BPM | DIASTOLIC BLOOD PRESSURE: 82 MMHG | SYSTOLIC BLOOD PRESSURE: 132 MMHG | TEMPERATURE: 98.2 F | RESPIRATION RATE: 16 BRPM | BODY MASS INDEX: 35.92 KG/M2

## 2017-11-28 DIAGNOSIS — C50.912 CARCINOMA OF LEFT BREAST METASTATIC TO SKIN (HCC): Primary | ICD-10-CM

## 2017-11-28 DIAGNOSIS — C79.2 CARCINOMA OF LEFT BREAST METASTATIC TO SKIN (HCC): Primary | ICD-10-CM

## 2017-11-28 DIAGNOSIS — C79.2 CARCINOMA OF LEFT BREAST METASTATIC TO SKIN (HCC): ICD-10-CM

## 2017-11-28 DIAGNOSIS — C50.912 CARCINOMA OF LEFT BREAST METASTATIC TO SKIN (HCC): ICD-10-CM

## 2017-11-28 DIAGNOSIS — C50.512 MALIGNANT NEOPLASM OF LOWER-OUTER QUADRANT OF LEFT FEMALE BREAST (HCC): ICD-10-CM

## 2017-11-28 DIAGNOSIS — Z17.0 MALIGNANT NEOPLASM OF LOWER-OUTER QUADRANT OF LEFT BREAST OF FEMALE, ESTROGEN RECEPTOR POSITIVE (HCC): ICD-10-CM

## 2017-11-28 DIAGNOSIS — C50.512 MALIGNANT NEOPLASM OF LOWER-OUTER QUADRANT OF LEFT BREAST OF FEMALE, ESTROGEN RECEPTOR POSITIVE (HCC): ICD-10-CM

## 2017-11-28 LAB
ALBUMIN SERPL-MCNC: 4.1 G/DL (ref 3.5–5.2)
ALBUMIN/GLOB SERPL: 1.1 G/DL (ref 1.1–2.4)
ALP SERPL-CCNC: 89 U/L (ref 38–116)
ALT SERPL W P-5'-P-CCNC: 22 U/L (ref 0–33)
ANION GAP SERPL CALCULATED.3IONS-SCNC: 13.8 MMOL/L
AST SERPL-CCNC: 20 U/L (ref 0–32)
BASOPHILS # BLD AUTO: 0.04 10*3/MM3 (ref 0–0.1)
BASOPHILS NFR BLD AUTO: 0.6 % (ref 0–1.1)
BILIRUB SERPL-MCNC: 0.3 MG/DL (ref 0.1–1.2)
BUN BLD-MCNC: 20 MG/DL (ref 6–20)
BUN/CREAT SERPL: 28.6 (ref 7.3–30)
CALCIUM SPEC-SCNC: 9.6 MG/DL (ref 8.5–10.2)
CANCER AG15-3 SERPL-ACNC: 21.6 U/ML
CHLORIDE SERPL-SCNC: 98 MMOL/L (ref 98–107)
CO2 SERPL-SCNC: 26.2 MMOL/L (ref 22–29)
CREAT BLD-MCNC: 0.7 MG/DL (ref 0.6–1.1)
DEPRECATED RDW RBC AUTO: 45 FL (ref 37–49)
EOSINOPHIL # BLD AUTO: 0.11 10*3/MM3 (ref 0–0.36)
EOSINOPHIL NFR BLD AUTO: 1.7 % (ref 1–5)
ERYTHROCYTE [DISTWIDTH] IN BLOOD BY AUTOMATED COUNT: 13.2 % (ref 11.7–14.5)
GFR SERPL CREATININE-BSD FRML MDRD: 83 ML/MIN/1.73
GLOBULIN UR ELPH-MCNC: 3.6 GM/DL (ref 1.8–3.5)
GLUCOSE BLD-MCNC: 171 MG/DL (ref 74–124)
HCT VFR BLD AUTO: 38.9 % (ref 34–45)
HGB BLD-MCNC: 12.7 G/DL (ref 11.5–14.9)
IMM GRANULOCYTES # BLD: 0.02 10*3/MM3 (ref 0–0.03)
IMM GRANULOCYTES NFR BLD: 0.3 % (ref 0–0.5)
LYMPHOCYTES # BLD AUTO: 1.34 10*3/MM3 (ref 1–3.5)
LYMPHOCYTES NFR BLD AUTO: 21 % (ref 20–49)
MCH RBC QN AUTO: 30.4 PG (ref 27–33)
MCHC RBC AUTO-ENTMCNC: 32.6 G/DL (ref 32–35)
MCV RBC AUTO: 93.1 FL (ref 83–97)
MONOCYTES # BLD AUTO: 0.54 10*3/MM3 (ref 0.25–0.8)
MONOCYTES NFR BLD AUTO: 8.5 % (ref 4–12)
NEUTROPHILS # BLD AUTO: 4.34 10*3/MM3 (ref 1.5–7)
NEUTROPHILS NFR BLD AUTO: 67.9 % (ref 39–75)
NRBC BLD MANUAL-RTO: 0 /100 WBC (ref 0–0)
PLATELET # BLD AUTO: 178 10*3/MM3 (ref 150–375)
PMV BLD AUTO: 9.2 FL (ref 8.9–12.1)
POTASSIUM BLD-SCNC: 4.1 MMOL/L (ref 3.5–4.7)
PROT SERPL-MCNC: 7.7 G/DL (ref 6.3–8)
RBC # BLD AUTO: 4.18 10*6/MM3 (ref 3.9–5)
SODIUM BLD-SCNC: 138 MMOL/L (ref 134–145)
WBC NRBC COR # BLD: 6.39 10*3/MM3 (ref 4–10)

## 2017-11-28 PROCEDURE — 36415 COLL VENOUS BLD VENIPUNCTURE: CPT | Performed by: INTERNAL MEDICINE

## 2017-11-28 PROCEDURE — 99213 OFFICE O/P EST LOW 20 MIN: CPT | Performed by: INTERNAL MEDICINE

## 2017-11-28 PROCEDURE — 86300 IMMUNOASSAY TUMOR CA 15-3: CPT | Performed by: INTERNAL MEDICINE

## 2017-11-28 PROCEDURE — 80053 COMPREHEN METABOLIC PANEL: CPT

## 2017-11-28 PROCEDURE — 85025 COMPLETE CBC W/AUTO DIFF WBC: CPT

## 2017-11-28 RX ORDER — METFORMIN HYDROCHLORIDE 500 MG/1
TABLET, EXTENDED RELEASE ORAL
Refills: 1 | COMMUNITY
Start: 2017-10-31 | End: 2020-01-08

## 2017-11-28 NOTE — PROGRESS NOTES
Subjective     REASONS FOR FOLLOWUP:   1. Chest wall recurrence of breast cancer, ER/NC positive, HER-2 negative, grade 2 with no evidence of distant spread. Arimidex started in 2014.   2. T1cN0, grade 1, ER/NC positive breast cancer treated with CMF and 5 years of tamoxifen in .   3. Moderate fatigue from Arimidex.   4. Response clinically and by CT scan on 2015.   5. Faslodex added to Arimidex in 2015 to optimize response for surgery.   6. Decision made to forego surgery and do radiation, continue Arimidex in 2015.        History of Present Illness  patient is a 65-year-old lady with a locally recurrent breast cancer 19 years from diagnosis currently on Arimidex with fair tolerance.  S  She is exercising and dieting and her depression and attitude are much better.  She is tolerating Arimidex without any problems.   Her tumor markers remain normal except for mild thrombocytopenia she has no lab abnormalities currently.  She has no cough or symptoms from her radiation scarring in the left lung apex.    PAST MEDICAL HISTORY: Significant for diabetes type 2, hypercholesterolemia and peripheral mariaelena ropathy from her diabetes for which he is on very low-dose Neurontin. She has had no heart attack, strokes or blood clots, no peptic ulcer disease, infectious mononucleosis, hepatitis, thyroid problems, or anemia.   PAST SURGICAL HISTORY: None except left breast mastectomy .     OB/GYN HISTORY: Menarche was at age 12, menopause in her late 40s induced by CMF chemotherapy. She is  3, para 2 with one miscarriage. First childbirth was at age 25. She did not breast-feed. Never took hormonal replacement.     ONCOLOGIC HISTORY: History of previous dates can be found in a separate document.   The patient was seen on 2015 after getting a 2nd opinion of Dr. Louis Brooks and seeing her surgeon in [place] again. Her circulating tumor cells were present and we felt this argued against doing  surgery and we opted to do radiation to the chest wall. Continue Arimidex for the time being and for progression switch to Faslodex and Ibrance. CT scans of the chest, abdomen, pelvis, and bone scan dated 10/07/2015 showed no evidence of systemic disease and just chest wall disease, which has all respon ded to her hormonal therapy and fatty liver.  Patient seen on 2016 after radiation with a PET scan that shows some radiation lung damage which is PET positive with a low SUV and no other signs of metastatic disease.  Arimidex continued    Her stool sample showed fecal blood and she had a colonoscopy with 3 adenomatous polyps removed - EGD was done and showed H. pylori infection and she was treated for this.  She is at the [facility] exercising with Silver sneakers and losing weight and overall has no complaints      SOCIAL HISTORY: She is . 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 She is a nondrinker.     FAMILY HISTORY: Father  at 59 o f an MI. Mother at 68 of lung cancer and was a smoker. She has paternal grandmother with breast cancer at age 47, a paternal aunt with breast cancer at age 59 and pancreatic cancer at 88. She has a paternal first cousin with breast cancer at 44, another p a ternal cousin with breast cancer at 44, another cousin with breast cancer at 61, and another paternal first cousin with breast cancer at 66. BRCA testing was done on the 44-year-old cousin and was negative. No history of ovarian cancer. She has a paterna l first cousin with colon cancer.     Review of Systems   Constitutional: Positive for fatigue.   Neurological: Positive for numbness.   All other systems reviewed and are negative.     A comprehensive 14 point review of systems was performed and was negative except as mentioned.    Current Outpatient Prescriptions on File Prior to Visit   Medication Sig Dispense Refill   • anastrozole (ARIMIDEX) 1 MG tablet TAKE 1 TABLET BY  "MOUTH DAILY. 90 tablet 2   • aspirin 81 MG chewable tablet Chew 81 mg daily.     • Calcium Carbonate-Vitamin D (CALCIUM-D PO) Take  by mouth Daily.     • Cholecalciferol (VITAMIN D) 1000 UNITS tablet Take 2,000 Units by mouth.     • gabapentin (NEURONTIN) 100 MG capsule Take 100 mg by mouth every day.     • hydrochlorothiazide (HYDRODIURIL) 25 MG tablet TAKE 1 TABLET BY MOUTH EVERY DAY FOR BLOOD PRESSURE  1   • lisinopril (PRINIVIL,ZESTRIL) 40 MG tablet TAKE 1 TABLET BY MOUTH EVERY DAY FOR BLOOD PRESSURE  1   • pantoprazole (PROTONIX) 40 MG EC tablet TAKE 1 TABLET BY MOUTH EVERY MORNING  11   • Probiotic Product (PROBIOTIC DAILY PO) Take  by mouth Daily.     • rosuvastatin (CRESTOR) 20 MG tablet Take 20 mg by mouth daily.     • TURMERIC PO Take  by mouth Daily.     • [DISCONTINUED] Liraglutide (VICTOZA SC) Inject  under the skin.       No current facility-administered medications on file prior to visit.          ALLERGIES:  No Known Allergies    Objective      Vitals:    11/28/17 0802   BP: 132/82   Pulse: 80   Resp: 16   Temp: 98.2 °F (36.8 °C)   Weight: 195 lb 3.2 oz (88.5 kg)   Height: 62.21\" (158 cm)  Comment: new ht.   PainSc: 0-No pain     Current Status 11/28/2017   ECOG score 0       Physical Exam    GENERAL:  Well-developed, well-nourished in no acute distress.   SKIN:  Warm, dry without rashes, purpura or petechiae.  HEAD:  Normocephalic.  EYES:  Pupils equal, round and reactive to light.  EOMs intact.  Conjunctivae normal.  EARS:  Hearing intact.  NOSE:  Septum midline.  No excoriations or nasal discharge.  MOUTH:  Tongue is well-papillated; no stomatitis or ulcers.  Lips normal.  THROAT:  Oropharynx without lesions or exudates.  NECK:  Supple with good range of motion; no thyromegaly or masses, no JVD.  LYMPHATICS:  No cervical, supraclavicular, axillary or inguinal adenopathy.  CHEST:  Lungs clear to percussion and auscultation. Good airflow.  BREASTS: Right breast with an inverted nipple and no " masses-left chest wall with significant radiation skin changes and no residual masses appreciated  CARDIAC:  Regular rate and rhythm without murmurs, rubs or gallops. Normal S1,S2.  ABDOMEN:  Soft, nontender with no organomegaly or masses.  EXTREMITIES:  No clubbing, cyanosis or edema.  NEUROLOGICAL:  Cranial Nerves II-XII grossly intact.  No focal neurological deficits.  PSYCHIATRIC:  Normal affect and mood.        RECENT LABS:  Hematology WBC   Date Value Ref Range Status   11/28/2017 6.39 4.00 - 10.00 10*3/mm3 Final     RBC   Date Value Ref Range Status   11/28/2017 4.18 3.90 - 5.00 10*6/mm3 Final     Hemoglobin   Date Value Ref Range Status   11/28/2017 12.7 11.5 - 14.9 g/dL Final     Hematocrit   Date Value Ref Range Status   11/28/2017 38.9 34.0 - 45.0 % Final     Platelets   Date Value Ref Range Status   11/28/2017 178 150 - 375 10*3/mm3 Final        CA-15-3= 21.6          Assessment/Plan   1.  Recurrent breast cancer to the left chest wall only-treated with aromatase inhibitors plus radiation with a negative PET scan in 6/16  2.  Anxiety and depression improved  3.  Significant weight gain with hormonal blockade currently improving with diet and exercise  4   elevated circulating tumor cells   5.  Numbness in the left upper arm and intrascapular regions?  Related to radiation-better    Plan  Return in 4 months with CA-15-3 for reviewAnd we will ordered CAT scans if she has any complaints or the marker is rising            11/28/2017      CC:

## 2018-03-23 ENCOUNTER — LAB (OUTPATIENT)
Dept: LAB | Facility: HOSPITAL | Age: 68
End: 2018-03-23

## 2018-03-23 ENCOUNTER — OFFICE VISIT (OUTPATIENT)
Dept: ONCOLOGY | Facility: CLINIC | Age: 68
End: 2018-03-23

## 2018-03-23 ENCOUNTER — APPOINTMENT (OUTPATIENT)
Dept: GENERAL RADIOLOGY | Facility: HOSPITAL | Age: 68
End: 2018-03-23

## 2018-03-23 VITALS
DIASTOLIC BLOOD PRESSURE: 68 MMHG | RESPIRATION RATE: 16 BRPM | TEMPERATURE: 98.9 F | BODY MASS INDEX: 35.55 KG/M2 | OXYGEN SATURATION: 97 % | HEIGHT: 62 IN | WEIGHT: 193.2 LBS | SYSTOLIC BLOOD PRESSURE: 130 MMHG | HEART RATE: 87 BPM

## 2018-03-23 DIAGNOSIS — C50.912 CARCINOMA OF LEFT BREAST METASTATIC TO SKIN (HCC): ICD-10-CM

## 2018-03-23 DIAGNOSIS — C79.2 CARCINOMA OF LEFT BREAST METASTATIC TO SKIN (HCC): Primary | ICD-10-CM

## 2018-03-23 DIAGNOSIS — C79.2 CARCINOMA OF LEFT BREAST METASTATIC TO SKIN (HCC): ICD-10-CM

## 2018-03-23 DIAGNOSIS — C50.512 MALIGNANT NEOPLASM OF LOWER-OUTER QUADRANT OF LEFT BREAST OF FEMALE, ESTROGEN RECEPTOR POSITIVE (HCC): ICD-10-CM

## 2018-03-23 DIAGNOSIS — Z17.0 MALIGNANT NEOPLASM OF LOWER-OUTER QUADRANT OF LEFT BREAST OF FEMALE, ESTROGEN RECEPTOR POSITIVE (HCC): ICD-10-CM

## 2018-03-23 DIAGNOSIS — C50.512 MALIGNANT NEOPLASM OF LOWER-OUTER QUADRANT OF LEFT FEMALE BREAST, UNSPECIFIED ESTROGEN RECEPTOR STATUS (HCC): Primary | ICD-10-CM

## 2018-03-23 DIAGNOSIS — C50.912 CARCINOMA OF LEFT BREAST METASTATIC TO SKIN (HCC): Primary | ICD-10-CM

## 2018-03-23 LAB
ALBUMIN SERPL-MCNC: 4.1 G/DL (ref 3.5–5.2)
ALBUMIN/GLOB SERPL: 1.2 G/DL (ref 1.1–2.4)
ALP SERPL-CCNC: 78 U/L (ref 38–116)
ALT SERPL W P-5'-P-CCNC: 25 U/L (ref 0–33)
ANION GAP SERPL CALCULATED.3IONS-SCNC: 12.3 MMOL/L
AST SERPL-CCNC: 21 U/L (ref 0–32)
BASOPHILS # BLD AUTO: 0.05 10*3/MM3 (ref 0–0.1)
BASOPHILS NFR BLD AUTO: 0.9 % (ref 0–1.1)
BILIRUB SERPL-MCNC: 0.2 MG/DL (ref 0.1–1.2)
BUN BLD-MCNC: 24 MG/DL (ref 6–20)
BUN/CREAT SERPL: 36.4 (ref 7.3–30)
CALCIUM SPEC-SCNC: 9.5 MG/DL (ref 8.5–10.2)
CANCER AG15-3 SERPL-ACNC: 20.6 U/ML
CHLORIDE SERPL-SCNC: 96 MMOL/L (ref 98–107)
CO2 SERPL-SCNC: 28.7 MMOL/L (ref 22–29)
CREAT BLD-MCNC: 0.66 MG/DL (ref 0.6–1.1)
DEPRECATED RDW RBC AUTO: 45.4 FL (ref 37–49)
EOSINOPHIL # BLD AUTO: 0.08 10*3/MM3 (ref 0–0.36)
EOSINOPHIL NFR BLD AUTO: 1.5 % (ref 1–5)
ERYTHROCYTE [DISTWIDTH] IN BLOOD BY AUTOMATED COUNT: 13.3 % (ref 11.7–14.5)
GFR SERPL CREATININE-BSD FRML MDRD: 89 ML/MIN/1.73
GLOBULIN UR ELPH-MCNC: 3.3 GM/DL (ref 1.8–3.5)
GLUCOSE BLD-MCNC: 181 MG/DL (ref 74–124)
HCT VFR BLD AUTO: 36.1 % (ref 34–45)
HGB BLD-MCNC: 11.7 G/DL (ref 11.5–14.9)
HOLD SPECIMEN: NORMAL
IMM GRANULOCYTES # BLD: 0.02 10*3/MM3 (ref 0–0.03)
IMM GRANULOCYTES NFR BLD: 0.4 % (ref 0–0.5)
LYMPHOCYTES # BLD AUTO: 1.62 10*3/MM3 (ref 1–3.5)
LYMPHOCYTES NFR BLD AUTO: 30.1 % (ref 20–49)
MCH RBC QN AUTO: 30.2 PG (ref 27–33)
MCHC RBC AUTO-ENTMCNC: 32.4 G/DL (ref 32–35)
MCV RBC AUTO: 93 FL (ref 83–97)
MONOCYTES # BLD AUTO: 0.52 10*3/MM3 (ref 0.25–0.8)
MONOCYTES NFR BLD AUTO: 9.6 % (ref 4–12)
NEUTROPHILS # BLD AUTO: 3.1 10*3/MM3 (ref 1.5–7)
NEUTROPHILS NFR BLD AUTO: 57.5 % (ref 39–75)
NRBC BLD MANUAL-RTO: 0 /100 WBC (ref 0–0)
PLATELET # BLD AUTO: 151 10*3/MM3 (ref 150–375)
PMV BLD AUTO: 9.3 FL (ref 8.9–12.1)
POTASSIUM BLD-SCNC: 4.3 MMOL/L (ref 3.5–4.7)
PROT SERPL-MCNC: 7.4 G/DL (ref 6.3–8)
RBC # BLD AUTO: 3.88 10*6/MM3 (ref 3.9–5)
SODIUM BLD-SCNC: 137 MMOL/L (ref 134–145)
WBC NRBC COR # BLD: 5.39 10*3/MM3 (ref 4–10)

## 2018-03-23 PROCEDURE — 36415 COLL VENOUS BLD VENIPUNCTURE: CPT | Performed by: INTERNAL MEDICINE

## 2018-03-23 PROCEDURE — 86300 IMMUNOASSAY TUMOR CA 15-3: CPT | Performed by: INTERNAL MEDICINE

## 2018-03-23 PROCEDURE — 71046 X-RAY EXAM CHEST 2 VIEWS: CPT

## 2018-03-23 PROCEDURE — 80053 COMPREHEN METABOLIC PANEL: CPT | Performed by: INTERNAL MEDICINE

## 2018-03-23 PROCEDURE — 85025 COMPLETE CBC W/AUTO DIFF WBC: CPT | Performed by: INTERNAL MEDICINE

## 2018-03-23 PROCEDURE — 99213 OFFICE O/P EST LOW 20 MIN: CPT | Performed by: INTERNAL MEDICINE

## 2018-03-23 NOTE — PROGRESS NOTES
Subjective     REASONS FOR FOLLOWUP:   1. Chest wall recurrence of breast cancer, ER/OH positive, HER-2 negative, grade 2 with no evidence of distant spread. Arimidex started in 12/2014.   2. T1cN0, grade 1, ER/OH positive breast cancer treated with CMF and 5 years of tamoxifen in 1995.   3. Moderate fatigue from Arimidex.   4. Response clinically and by CT scan on 03/30/2015.   5. Faslodex added to Arimidex in July 2015 to optimize response for surgery.   6. Decision made to forego surgery and do radiation, continue Arimidex in 11/2015.        History of Present Illness  patient is a 65-year-old lady with a locally recurrent breast cancer 19 years from diagnosis currently on Arimidex with fair tolerance.  S  She is exercising and dieting and her depression and attitude are much better.  She is tolerating Arimidex without any problems.   Her tumor markers remain normal andf  except for mild thrombocytopenia she has no lab abnormalities currently.  She has no cough or symptoms from her radiation scarring in the left lung apex.  She has noticed some sharp discomfort when she turns unexpectedly to the left side in the rib cage but it is not present on palpating and not reproducible every time and not bothering her too much but we'll keep an eye on the symptoms and order bone scan if they worsen  Chest x-ray done today is pending  She had a cousin have returned from a trip to the [place] and she had a wonderful time and I encouraged her to travel and keep busy    PAST MEDICAL HISTORY: Significant for diabetes type 2, hypercholesterolemia and peripheral mariaelena ropathy from her diabetes for which he is on very low-dose Neurontin. She has had no heart attack, strokes or blood clots, no peptic ulcer disease, infectious mononucleosis, hepatitis, thyroid problems, or anemia.   PAST SURGICAL HISTORY: None except left breast mastectomy 1996.     OB/GYN HISTORY: Menarche was at age 12, menopause in her late 40s induced by CMF  chemotherapy. She is  3, para 2 with one miscarriage. First childbirth was at age 25. She did not breast-feed. Never took hormonal replacement.     ONCOLOGIC HISTORY: History of previous dates can be found in a separate document.   The patient was seen on 2015 after getting a 2nd opinion of Dr. Louis Brooks and seeing her surgeon in [place] again. Her circulating tumor cells were present and we felt this argued against doing surgery and we opted to do radiation to the chest wall. Continue Arimidex for the time being and for progression switch to Faslodex and Ibrance. CT scans of the chest, abdomen, pelvis, and bone scan dated 10/07/2015 showed no evidence of systemic disease and just chest wall disease, which has all respon ded to her hormonal therapy and fatty liver.  Patient seen on 2016 after radiation with a PET scan that shows some radiation lung damage which is PET positive with a low SUV and no other signs of metastatic disease.  Arimidex continued    Her stool sample showed fecal blood and she had a colonoscopy with 3 adenomatous polyps removed - EGD was done and showed H. pylori infection and she was treated for this.  She is at the [employer] exercising with Silver sneakers and losing weight and overall has no complaints      SOCIAL HISTORY: She is . 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 She is a nondrinker.     FAMILY HISTORY: Father  at 59 o f an MI. Mother at 68 of lung cancer and was a smoker. She has paternal grandmother with breast cancer at age 47, a paternal aunt with breast cancer at age 59 and pancreatic cancer at 88. She has a paternal first cousin with breast cancer at 44, another p a ternal cousin with breast cancer at 44, another cousin with breast cancer at 61, and another paternal first cousin with breast cancer at 66. BRCA testing was done on the 44-year-old cousin and was negative. No history of ovarian cancer. She  "has a paterna l first cousin with colon cancer.     Review of Systems   Constitutional: Positive for fatigue.   Neurological: Positive for numbness.   All other systems reviewed and are negative.     A comprehensive 14 point review of systems was performed and was negative except as mentioned.    Current Outpatient Prescriptions on File Prior to Visit   Medication Sig Dispense Refill   • anastrozole (ARIMIDEX) 1 MG tablet TAKE 1 TABLET BY MOUTH DAILY. 90 tablet 2   • Calcium Carbonate-Vitamin D (CALCIUM-D PO) Take  by mouth Daily.     • Cholecalciferol (VITAMIN D) 1000 UNITS tablet Take 2,000 Units by mouth.     • gabapentin (NEURONTIN) 100 MG capsule Take 100 mg by mouth every day.     • hydrochlorothiazide (HYDRODIURIL) 25 MG tablet TAKE 1 TABLET BY MOUTH EVERY DAY FOR BLOOD PRESSURE  1   • lisinopril (PRINIVIL,ZESTRIL) 40 MG tablet TAKE 1 TABLET BY MOUTH EVERY DAY FOR BLOOD PRESSURE  1   • metFORMIN ER (GLUCOPHAGE-XR) 500 MG 24 hr tablet TAKE 2 TABLETS BY MOUTH DAILY WITH SUPPER FOR DIABETES  1   • Probiotic Product (PROBIOTIC DAILY PO) Take  by mouth Daily.     • rosuvastatin (CRESTOR) 20 MG tablet Take 20 mg by mouth daily.     • TURMERIC PO Take  by mouth Daily.     • [DISCONTINUED] aspirin 81 MG chewable tablet Chew 81 mg daily.     • [DISCONTINUED] pantoprazole (PROTONIX) 40 MG EC tablet TAKE 1 TABLET BY MOUTH EVERY MORNING  11     No current facility-administered medications on file prior to visit.          ALLERGIES:  No Known Allergies    Objective      Vitals:    03/23/18 1355   BP: 130/68   Pulse: 87   Resp: 16   Temp: 98.9 °F (37.2 °C)   TempSrc: Oral   SpO2: 97%   Weight: 87.6 kg (193 lb 3.2 oz)   Height: 158 cm (62.21\")   PainSc: 6  Comment: L breast- sharp pain     Current Status 3/23/2018   ECOG score 0       Physical Exam    GENERAL:  Well-developed, well-nourished in no acute distress.   SKIN:  Warm, dry without rashes, purpura or petechiae.  HEAD:  Normocephalic.  EYES:  Pupils equal, round and " reactive to light.  EOMs intact.  Conjunctivae normal.  EARS:  Hearing intact.  NOSE:  Septum midline.  No excoriations or nasal discharge.  MOUTH:  Tongue is well-papillated; no stomatitis or ulcers.  Lips normal.  THROAT:  Oropharynx without lesions or exudates.  NECK:  Supple with good range of motion; no thyromegaly or masses, no JVD.  LYMPHATICS:  No cervical, supraclavicular, axillary or inguinal adenopathy.  CHEST:  Lungs clear to percussion and auscultation. Good airflow.  BREASTS: Right breast with an inverted nipple and no masses-left chest wall with significant radiation skin changes and no residual masses appreciated  CARDIAC:  Regular rate and rhythm without murmurs, rubs or gallops. Normal S1,S2.  ABDOMEN:  Soft, nontender with no organomegaly or masses.  EXTREMITIES:  No clubbing, cyanosis or edema.  NEUROLOGICAL:  Cranial Nerves II-XII grossly intact.  No focal neurological deficits.  PSYCHIATRIC:  Normal affect and mood.        RECENT LABS:  Hematology WBC   Date Value Ref Range Status   03/23/2018 5.39 4.00 - 10.00 10*3/mm3 Final     RBC   Date Value Ref Range Status   03/23/2018 3.88 (L) 3.90 - 5.00 10*6/mm3 Final     Hemoglobin   Date Value Ref Range Status   03/23/2018 11.7 11.5 - 14.9 g/dL Final     Hematocrit   Date Value Ref Range Status   03/23/2018 36.1 34.0 - 45.0 % Final     Platelets   Date Value Ref Range Status   03/23/2018 151 150 - 375 10*3/mm3 Final        CA-15-3= Pending      Chest x-ray pending    Assessment/Plan   1.  Recurrent breast cancer to the left chest wall only-treated with aromatase inhibitors plus radiation with a negative PET scan in 6/16  2.  Anxiety and depression improved  3.  Significant weight gain with hormonal blockade currently improving with diet and exercise  4   elevated circulating tumor cells   5.  Numbness in the left upper arm and intrascapular regions?  Related to radiation-better    Plan  Return in 4 months with CA-15-3 for reviewAnd we will ordered  CAT scans if she has any complaints or the marker is rising  I explained to Charles that when we started her therapy there was no recommendations for CD K4/6 inhibitor first line and with a 20 year interval to recurrence I don't feel strongly about adding this in the first line and we will watch for the time being without it              3/23/2018      CC:

## 2018-07-12 ENCOUNTER — OFFICE VISIT (OUTPATIENT)
Dept: ONCOLOGY | Facility: CLINIC | Age: 68
End: 2018-07-12

## 2018-07-12 ENCOUNTER — LAB (OUTPATIENT)
Dept: LAB | Facility: HOSPITAL | Age: 68
End: 2018-07-12

## 2018-07-12 VITALS
BODY MASS INDEX: 35 KG/M2 | SYSTOLIC BLOOD PRESSURE: 124 MMHG | OXYGEN SATURATION: 96 % | HEART RATE: 78 BPM | WEIGHT: 190.2 LBS | RESPIRATION RATE: 16 BRPM | DIASTOLIC BLOOD PRESSURE: 74 MMHG | TEMPERATURE: 98.6 F | HEIGHT: 62 IN

## 2018-07-12 DIAGNOSIS — C79.2 CARCINOMA OF LEFT BREAST METASTATIC TO SKIN (HCC): Primary | ICD-10-CM

## 2018-07-12 DIAGNOSIS — C50.512 MALIGNANT NEOPLASM OF LOWER-OUTER QUADRANT OF LEFT BREAST OF FEMALE, ESTROGEN RECEPTOR POSITIVE (HCC): ICD-10-CM

## 2018-07-12 DIAGNOSIS — C50.912 CARCINOMA OF LEFT BREAST METASTATIC TO SKIN (HCC): ICD-10-CM

## 2018-07-12 DIAGNOSIS — C50.912 CARCINOMA OF LEFT BREAST METASTATIC TO SKIN (HCC): Primary | ICD-10-CM

## 2018-07-12 DIAGNOSIS — Z17.0 MALIGNANT NEOPLASM OF LOWER-OUTER QUADRANT OF LEFT BREAST OF FEMALE, ESTROGEN RECEPTOR POSITIVE (HCC): ICD-10-CM

## 2018-07-12 DIAGNOSIS — C79.2 CARCINOMA OF LEFT BREAST METASTATIC TO SKIN (HCC): ICD-10-CM

## 2018-07-12 LAB
ALBUMIN SERPL-MCNC: 4.2 G/DL (ref 3.5–5.2)
ALBUMIN/GLOB SERPL: 1.2 G/DL (ref 1.1–2.4)
ALP SERPL-CCNC: 88 U/L (ref 38–116)
ALT SERPL W P-5'-P-CCNC: 24 U/L (ref 0–33)
ANION GAP SERPL CALCULATED.3IONS-SCNC: 13.3 MMOL/L
AST SERPL-CCNC: 24 U/L (ref 0–32)
BASOPHILS # BLD AUTO: 0.05 10*3/MM3 (ref 0–0.1)
BASOPHILS NFR BLD AUTO: 0.8 % (ref 0–1.1)
BILIRUB SERPL-MCNC: 0.3 MG/DL (ref 0.1–1.2)
BUN BLD-MCNC: 27 MG/DL (ref 6–20)
BUN/CREAT SERPL: 28.1 (ref 7.3–30)
CALCIUM SPEC-SCNC: 9.8 MG/DL (ref 8.5–10.2)
CANCER AG15-3 SERPL-ACNC: 20 U/ML
CHLORIDE SERPL-SCNC: 98 MMOL/L (ref 98–107)
CO2 SERPL-SCNC: 26.7 MMOL/L (ref 22–29)
CREAT BLD-MCNC: 0.96 MG/DL (ref 0.6–1.1)
DEPRECATED RDW RBC AUTO: 45.2 FL (ref 37–49)
EOSINOPHIL # BLD AUTO: 0.13 10*3/MM3 (ref 0–0.36)
EOSINOPHIL NFR BLD AUTO: 2.1 % (ref 1–5)
ERYTHROCYTE [DISTWIDTH] IN BLOOD BY AUTOMATED COUNT: 13.4 % (ref 11.7–14.5)
GFR SERPL CREATININE-BSD FRML MDRD: 58 ML/MIN/1.73
GLOBULIN UR ELPH-MCNC: 3.6 GM/DL (ref 1.8–3.5)
GLUCOSE BLD-MCNC: 134 MG/DL (ref 74–124)
HCT VFR BLD AUTO: 39.9 % (ref 34–45)
HGB BLD-MCNC: 13 G/DL (ref 11.5–14.9)
IMM GRANULOCYTES # BLD: 0.02 10*3/MM3 (ref 0–0.03)
IMM GRANULOCYTES NFR BLD: 0.3 % (ref 0–0.5)
LYMPHOCYTES # BLD AUTO: 1.42 10*3/MM3 (ref 1–3.5)
LYMPHOCYTES NFR BLD AUTO: 22.5 % (ref 20–49)
MCH RBC QN AUTO: 29.9 PG (ref 27–33)
MCHC RBC AUTO-ENTMCNC: 32.6 G/DL (ref 32–35)
MCV RBC AUTO: 91.7 FL (ref 83–97)
MONOCYTES # BLD AUTO: 0.63 10*3/MM3 (ref 0.25–0.8)
MONOCYTES NFR BLD AUTO: 10 % (ref 4–12)
NEUTROPHILS # BLD AUTO: 4.07 10*3/MM3 (ref 1.5–7)
NEUTROPHILS NFR BLD AUTO: 64.3 % (ref 39–75)
NRBC BLD MANUAL-RTO: 0 /100 WBC (ref 0–0)
PLATELET # BLD AUTO: 167 10*3/MM3 (ref 150–375)
PMV BLD AUTO: 9.2 FL (ref 8.9–12.1)
POTASSIUM BLD-SCNC: 4.1 MMOL/L (ref 3.5–4.7)
PROT SERPL-MCNC: 7.8 G/DL (ref 6.3–8)
RBC # BLD AUTO: 4.35 10*6/MM3 (ref 3.9–5)
SODIUM BLD-SCNC: 138 MMOL/L (ref 134–145)
WBC NRBC COR # BLD: 6.32 10*3/MM3 (ref 4–10)

## 2018-07-12 PROCEDURE — 86300 IMMUNOASSAY TUMOR CA 15-3: CPT | Performed by: INTERNAL MEDICINE

## 2018-07-12 PROCEDURE — 85025 COMPLETE CBC W/AUTO DIFF WBC: CPT | Performed by: INTERNAL MEDICINE

## 2018-07-12 PROCEDURE — 80053 COMPREHEN METABOLIC PANEL: CPT | Performed by: INTERNAL MEDICINE

## 2018-07-12 PROCEDURE — 36415 COLL VENOUS BLD VENIPUNCTURE: CPT | Performed by: INTERNAL MEDICINE

## 2018-07-12 PROCEDURE — 99213 OFFICE O/P EST LOW 20 MIN: CPT | Performed by: INTERNAL MEDICINE

## 2018-07-12 NOTE — PROGRESS NOTES
Subjective     REASONS FOR FOLLOWUP:   1. Chest wall recurrence of breast cancer, ER/FL positive, HER-2 negative, grade 2 with no evidence of distant spread. Arimidex started in 12/2014.   2. T1cN0, grade 1, ER/FL positive breast cancer treated with CMF and 5 years of tamoxifen in 1995.   3. Moderate fatigue from Arimidex.   4. Response clinically and by CT scan on 03/30/2015.   5. Faslodex added to Arimidex in July 2015 to optimize response for surgery.   6. Decision made to forego surgery and do radiation, continue Arimidex in 11/2015.        History of Present Illness  patient is a 65-year-old lady with a locally recurrent breast cancer 19 years from diagnosis currently on Arimidex with fair tolerance.  S  She is exercising and dieting and her depression and attitude are much better.  She is tolerating Arimidex without any problems.   Her tumor markers remain normal andf  except for mild thrombocytopenia she has no lab abnormalities currently.  She has no cough or symptoms from her radiation scarring in the left lung apex.  She has noticed some sharp discomfort when she turns unexpectedly to the left side in the rib cage but it is not present on palpating and not reproducible every time and not bothering her too much but we'll keep an eye on the symptoms and order bone scan if they worsen  Chest x-ray done today is pending  She had a cousin have returned from a trip to the [place] and she had a wonderful time and I encouraged her to travel and keep busy    PAST MEDICAL HISTORY: Significant for diabetes type 2, hypercholesterolemia and peripheral mariaelena ropathy from her diabetes for which he is on very low-dose Neurontin. She has had no heart attack, strokes or blood clots, no peptic ulcer disease, infectious mononucleosis, hepatitis, thyroid problems, or anemia.   PAST SURGICAL HISTORY: None except left breast mastectomy 1996.     OB/GYN HISTORY: Menarche was at age 12, menopause in her late 40s induced by CMF  chemotherapy. She is  3, para 2 with one miscarriage. First childbirth was at age 25. She did not breast-feed. Never took hormonal replacement.     ONCOLOGIC HISTORY: History of previous dates can be found in a separate document.   The patient was seen on 2015 after getting a 2nd opinion of Dr. Louis Brooks and seeing her surgeon in [place] again. Her circulating tumor cells were present and we felt this argued against doing surgery and we opted to do radiation to the chest wall. Continue Arimidex for the time being and for progression switch to Faslodex and Ibrance. CT scans of the chest, abdomen, pelvis, and bone scan dated 10/07/2015 showed no evidence of systemic disease and just chest wall disease, which has all respon ded to her hormonal therapy and fatty liver.  Patient seen on 2016 after radiation with a PET scan that shows some radiation lung damage which is PET positive with a low SUV and no other signs of metastatic disease.  Arimidex continued    Her stool sample showed fecal blood and she had a colonoscopy with 3 adenomatous polyps removed - EGD was done and showed H. pylori infection and she was treated for this.  She is at the [employer] exercising with Silver sneakers and losing weight and overall has no complaints      SOCIAL HISTORY: She is . 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 She is a nondrinker.     FAMILY HISTORY: Father  at 59 o f an MI. Mother at 68 of lung cancer and was a smoker. She has paternal grandmother with breast cancer at age 47, a paternal aunt with breast cancer at age 59 and pancreatic cancer at 88. She has a paternal first cousin with breast cancer at 44, another p a ternal cousin with breast cancer at 44, another cousin with breast cancer at 61, and another paternal first cousin with breast cancer at 66. BRCA testing was done on the 44-year-old cousin and was negative. No history of ovarian cancer. She  "has a paterna l first cousin with colon cancer.     Review of Systems   Constitutional: Positive for fatigue.   Neurological: Positive for numbness.   All other systems reviewed and are negative.     A comprehensive 14 point review of systems was performed and was negative except as mentioned.    Current Outpatient Prescriptions on File Prior to Visit   Medication Sig Dispense Refill   • anastrozole (ARIMIDEX) 1 MG tablet TAKE 1 TABLET BY MOUTH DAILY. 90 tablet 2   • Calcium Carbonate-Vitamin D (CALCIUM-D PO) Take  by mouth Daily.     • Cholecalciferol (VITAMIN D) 1000 UNITS tablet Take 2,000 Units by mouth.     • hydrochlorothiazide (HYDRODIURIL) 25 MG tablet TAKE 1 TABLET BY MOUTH EVERY DAY FOR BLOOD PRESSURE  1   • lisinopril (PRINIVIL,ZESTRIL) 40 MG tablet TAKE 1 TABLET BY MOUTH EVERY DAY FOR BLOOD PRESSURE  1   • metFORMIN ER (GLUCOPHAGE-XR) 500 MG 24 hr tablet TAKE 2 TABLETS BY MOUTH DAILY WITH SUPPER FOR DIABETES  1   • rosuvastatin (CRESTOR) 20 MG tablet Take 20 mg by mouth daily.     • TURMERIC PO Take  by mouth Daily.     • gabapentin (NEURONTIN) 100 MG capsule Take 100 mg by mouth every day.     • Probiotic Product (PROBIOTIC DAILY PO) Take  by mouth Daily.       No current facility-administered medications on file prior to visit.          ALLERGIES:  No Known Allergies    Objective      Vitals:    07/12/18 0930   BP: 124/74   Pulse: 78   Resp: 16   Temp: 98.6 °F (37 °C)   TempSrc: Oral   SpO2: 96%   Weight: 86.3 kg (190 lb 3.2 oz)   Height: 158 cm (62.21\")   PainSc: 0-No pain     Current Status 7/12/2018   ECOG score 0       Physical Exam    GENERAL:  Well-developed, well-nourished in no acute distress.   SKIN:  Warm, dry without rashes, purpura or petechiae.  HEAD:  Normocephalic.  EYES:  Pupils equal, round and reactive to light.  EOMs intact.  Conjunctivae normal.  EARS:  Hearing intact.  NOSE:  Septum midline.  No excoriations or nasal discharge.  MOUTH:  Tongue is well-papillated; no stomatitis or " ulcers.  Lips normal.  THROAT:  Oropharynx without lesions or exudates.  NECK:  Supple with good range of motion; no thyromegaly or masses, no JVD.  LYMPHATICS:  No cervical, supraclavicular, axillary or inguinal adenopathy.  CHEST:  Lungs clear to percussion and auscultation. Good airflow.  BREASTS: Right breast with an inverted nipple and no masses-left chest wall with significant radiation skin changes and no residual masses appreciated  CARDIAC:  Regular rate and rhythm without murmurs, rubs or gallops. Normal S1,S2.  ABDOMEN:  Soft, nontender with no organomegaly or masses.  EXTREMITIES:  No clubbing, cyanosis or edema.  NEUROLOGICAL:  Cranial Nerves II-XII grossly intact.  No focal neurological deficits.  PSYCHIATRIC:  Normal affect and mood.        RECENT LABS:  Hematology WBC   Date Value Ref Range Status   07/12/2018 6.32 4.00 - 10.00 10*3/mm3 Final     RBC   Date Value Ref Range Status   07/12/2018 4.35 3.90 - 5.00 10*6/mm3 Final     Hemoglobin   Date Value Ref Range Status   07/12/2018 13.0 11.5 - 14.9 g/dL Final     Hematocrit   Date Value Ref Range Status   07/12/2018 39.9 34.0 - 45.0 % Final     Platelets   Date Value Ref Range Status   07/12/2018 167 150 - 375 10*3/mm3 Final        CA-15-3= 20        Assessment/Plan   1.  Recurrent breast cancer to the left chest wall only-treated with aromatase inhibitors plus radiation with a negative PET scan in 6/16  2.  Anxiety and depression improved  3.  Significant weight gain with hormonal blockade currently improving with diet and exercise  4   elevated circulating tumor cells   5.  Numbness in the left upper arm and intrascapular regions?  Related to radiation-better    Plan  Return in 4 months with CA-15-3 for reviewAnd we will ordered CAT scans if she has any complaints or the marker is rising  I explained to Charles that when we started her therapy there was no recommendations for CD K4/6 inhibitor first lineWhen we started treatment and with a 20 year  interval to recurrence I don't feel strongly about adding this in the first line and we will watch for the time being without it              7/12/2018      CC:

## 2018-07-20 RX ORDER — ANASTROZOLE 1 MG/1
TABLET ORAL
Qty: 90 TABLET | Refills: 2 | Status: SHIPPED | OUTPATIENT
Start: 2018-07-20 | End: 2019-07-24 | Stop reason: SDUPTHER

## 2018-10-31 ENCOUNTER — OFFICE VISIT (OUTPATIENT)
Dept: ONCOLOGY | Facility: CLINIC | Age: 68
End: 2018-10-31

## 2018-10-31 ENCOUNTER — LAB (OUTPATIENT)
Dept: LAB | Facility: HOSPITAL | Age: 68
End: 2018-10-31

## 2018-10-31 VITALS
WEIGHT: 191.4 LBS | SYSTOLIC BLOOD PRESSURE: 140 MMHG | RESPIRATION RATE: 16 BRPM | TEMPERATURE: 98.1 F | OXYGEN SATURATION: 98 % | HEART RATE: 70 BPM | DIASTOLIC BLOOD PRESSURE: 80 MMHG | HEIGHT: 62 IN | BODY MASS INDEX: 35.22 KG/M2

## 2018-10-31 DIAGNOSIS — C79.2 CARCINOMA OF LEFT BREAST METASTATIC TO SKIN (HCC): ICD-10-CM

## 2018-10-31 DIAGNOSIS — C50.512 MALIGNANT NEOPLASM OF LOWER-OUTER QUADRANT OF LEFT BREAST OF FEMALE, ESTROGEN RECEPTOR POSITIVE (HCC): Primary | ICD-10-CM

## 2018-10-31 DIAGNOSIS — Z17.0 MALIGNANT NEOPLASM OF LOWER-OUTER QUADRANT OF LEFT BREAST OF FEMALE, ESTROGEN RECEPTOR POSITIVE (HCC): Primary | ICD-10-CM

## 2018-10-31 DIAGNOSIS — C50.912 CARCINOMA OF LEFT BREAST METASTATIC TO SKIN (HCC): ICD-10-CM

## 2018-10-31 LAB
ALBUMIN SERPL-MCNC: 4.3 G/DL (ref 3.5–5.2)
ALBUMIN/GLOB SERPL: 1.3 G/DL (ref 1.1–2.4)
ALP SERPL-CCNC: 80 U/L (ref 38–116)
ALT SERPL W P-5'-P-CCNC: 24 U/L (ref 0–33)
ANION GAP SERPL CALCULATED.3IONS-SCNC: 14.6 MMOL/L
AST SERPL-CCNC: 22 U/L (ref 0–32)
BASOPHILS # BLD AUTO: 0.04 10*3/MM3 (ref 0–0.1)
BASOPHILS NFR BLD AUTO: 0.7 % (ref 0–1.1)
BILIRUB SERPL-MCNC: 0.3 MG/DL (ref 0.1–1.2)
BUN BLD-MCNC: 15 MG/DL (ref 6–20)
BUN/CREAT SERPL: 21.7 (ref 7.3–30)
CALCIUM SPEC-SCNC: 9.7 MG/DL (ref 8.5–10.2)
CANCER AG15-3 SERPL-ACNC: 21 U/ML
CHLORIDE SERPL-SCNC: 99 MMOL/L (ref 98–107)
CO2 SERPL-SCNC: 27.4 MMOL/L (ref 22–29)
CREAT BLD-MCNC: 0.69 MG/DL (ref 0.6–1.1)
DEPRECATED RDW RBC AUTO: 43.7 FL (ref 37–49)
EOSINOPHIL # BLD AUTO: 0.13 10*3/MM3 (ref 0–0.36)
EOSINOPHIL NFR BLD AUTO: 2.1 % (ref 1–5)
ERYTHROCYTE [DISTWIDTH] IN BLOOD BY AUTOMATED COUNT: 13.2 % (ref 11.7–14.5)
GFR SERPL CREATININE-BSD FRML MDRD: 84 ML/MIN/1.73
GLOBULIN UR ELPH-MCNC: 3.4 GM/DL (ref 1.8–3.5)
GLUCOSE BLD-MCNC: 128 MG/DL (ref 74–124)
HCT VFR BLD AUTO: 41 % (ref 34–45)
HGB BLD-MCNC: 13.6 G/DL (ref 11.5–14.9)
IMM GRANULOCYTES # BLD: 0.03 10*3/MM3 (ref 0–0.03)
IMM GRANULOCYTES NFR BLD: 0.5 % (ref 0–0.5)
LYMPHOCYTES # BLD AUTO: 1.44 10*3/MM3 (ref 1–3.5)
LYMPHOCYTES NFR BLD AUTO: 23.6 % (ref 20–49)
MCH RBC QN AUTO: 30.3 PG (ref 27–33)
MCHC RBC AUTO-ENTMCNC: 33.2 G/DL (ref 32–35)
MCV RBC AUTO: 91.3 FL (ref 83–97)
MONOCYTES # BLD AUTO: 0.58 10*3/MM3 (ref 0.25–0.8)
MONOCYTES NFR BLD AUTO: 9.5 % (ref 4–12)
NEUTROPHILS # BLD AUTO: 3.87 10*3/MM3 (ref 1.5–7)
NEUTROPHILS NFR BLD AUTO: 63.6 % (ref 39–75)
NRBC BLD MANUAL-RTO: 0 /100 WBC (ref 0–0)
PLATELET # BLD AUTO: 188 10*3/MM3 (ref 150–375)
PMV BLD AUTO: 9.6 FL (ref 8.9–12.1)
POTASSIUM BLD-SCNC: 4.3 MMOL/L (ref 3.5–4.7)
PROT SERPL-MCNC: 7.7 G/DL (ref 6.3–8)
RBC # BLD AUTO: 4.49 10*6/MM3 (ref 3.9–5)
SODIUM BLD-SCNC: 141 MMOL/L (ref 134–145)
WBC NRBC COR # BLD: 6.09 10*3/MM3 (ref 4–10)

## 2018-10-31 PROCEDURE — 86300 IMMUNOASSAY TUMOR CA 15-3: CPT | Performed by: INTERNAL MEDICINE

## 2018-10-31 PROCEDURE — 80053 COMPREHEN METABOLIC PANEL: CPT | Performed by: INTERNAL MEDICINE

## 2018-10-31 PROCEDURE — 99213 OFFICE O/P EST LOW 20 MIN: CPT | Performed by: INTERNAL MEDICINE

## 2018-10-31 PROCEDURE — 36415 COLL VENOUS BLD VENIPUNCTURE: CPT | Performed by: INTERNAL MEDICINE

## 2018-10-31 PROCEDURE — 85025 COMPLETE CBC W/AUTO DIFF WBC: CPT | Performed by: INTERNAL MEDICINE

## 2018-10-31 RX ORDER — ASPIRIN 81 MG/1
81 TABLET ORAL DAILY
COMMUNITY
End: 2019-06-14

## 2018-11-12 ENCOUNTER — APPOINTMENT (OUTPATIENT)
Dept: WOMENS IMAGING | Facility: HOSPITAL | Age: 68
End: 2018-11-12

## 2018-11-12 PROCEDURE — 77063 BREAST TOMOSYNTHESIS BI: CPT | Performed by: RADIOLOGY

## 2018-11-12 PROCEDURE — MDREVIEWSP: Performed by: RADIOLOGY

## 2018-11-12 PROCEDURE — 77067 SCR MAMMO BI INCL CAD: CPT | Performed by: RADIOLOGY

## 2018-12-26 ENCOUNTER — TELEPHONE (OUTPATIENT)
Dept: ONCOLOGY | Facility: HOSPITAL | Age: 68
End: 2018-12-26

## 2018-12-26 NOTE — TELEPHONE ENCOUNTER
----- Message from Tracey Berry sent at 12/26/2018  8:24 AM EST -----  [phone]  Area is red around her breast surgery  Around incision area. A bit worried

## 2018-12-26 NOTE — TELEPHONE ENCOUNTER
Pt called and said the color of her skin around her breast old incsion is a little red, not inflamed or tender and may be the color that it already was but she is not sure if this is a change. She has scans scheduled for the end of January but wants to discuss this with Dr Mattson and requested that Dr Mattson call her. Dr Mattson will not be her until Friday. Pt is ok with this. Message sent to Dr Mattson.

## 2018-12-28 ENCOUNTER — TELEPHONE (OUTPATIENT)
Dept: ONCOLOGY | Facility: CLINIC | Age: 68
End: 2018-12-28

## 2018-12-28 NOTE — TELEPHONE ENCOUNTER
----- Message from Clementine Villalobos RN sent at 12/28/2018  4:12 PM EST -----  Please arrange an appointment with Dr Mattson for next week.  Thanks  ----- Message -----  From: Arlen Mattson MD  Sent: 12/28/2018   1:07 PM  To: Clementine Villalobos RN    Ask pt to come in next week and show me  ----- Message -----  From: Clementine Villalobos RN  Sent: 12/26/2018   8:48 AM  To: MD Dr Srinivasan Leung.  This pt called and said the color of her skin around her breast old incsion is a little red, not inflamed or tender and may be the color that it already was but she is not sure if this is a change. She has scans scheduled for the end of January but wants to discuss this with you. She actually wanted to send you a picture of her breast but you weren't here. She said it could wait until you're back. She wanted you to call her at [phone].

## 2018-12-28 NOTE — TELEPHONE ENCOUNTER
----- Message from Arlen Mattson MD sent at 12/28/2018  1:07 PM EST -----  Ask pt to come in next week and show me  ----- Message -----  From: Clementine Villalobos RN  Sent: 12/26/2018   8:48 AM  To: MD Dr Srinivasan Leung.  This pt called and said the color of her skin around her breast old incsion is a little red, not inflamed or tender and may be the color that it already was but she is not sure if this is a change. She has scans scheduled for the end of January but wants to discuss this with you. She actually wanted to send you a picture of her breast but you weren't here. She said it could wait until you're back. She wanted you to call her at [phone].

## 2018-12-31 ENCOUNTER — LAB (OUTPATIENT)
Dept: OTHER | Facility: HOSPITAL | Age: 68
End: 2018-12-31

## 2018-12-31 ENCOUNTER — OFFICE VISIT (OUTPATIENT)
Dept: ONCOLOGY | Facility: CLINIC | Age: 68
End: 2018-12-31

## 2018-12-31 VITALS
TEMPERATURE: 98.5 F | HEART RATE: 89 BPM | BODY MASS INDEX: 35.17 KG/M2 | DIASTOLIC BLOOD PRESSURE: 84 MMHG | SYSTOLIC BLOOD PRESSURE: 166 MMHG | HEIGHT: 62 IN | RESPIRATION RATE: 18 BRPM | OXYGEN SATURATION: 96 % | WEIGHT: 191.1 LBS

## 2018-12-31 DIAGNOSIS — C50.912 CARCINOMA OF LEFT BREAST METASTATIC TO SKIN (HCC): Primary | ICD-10-CM

## 2018-12-31 DIAGNOSIS — Z17.0 MALIGNANT NEOPLASM OF LOWER-OUTER QUADRANT OF LEFT BREAST OF FEMALE, ESTROGEN RECEPTOR POSITIVE (HCC): ICD-10-CM

## 2018-12-31 DIAGNOSIS — C50.512 MALIGNANT NEOPLASM OF LOWER-OUTER QUADRANT OF LEFT BREAST OF FEMALE, ESTROGEN RECEPTOR POSITIVE (HCC): ICD-10-CM

## 2018-12-31 DIAGNOSIS — C79.2 CARCINOMA OF LEFT BREAST METASTATIC TO SKIN (HCC): Primary | ICD-10-CM

## 2018-12-31 LAB
ALBUMIN SERPL-MCNC: 4.6 G/DL (ref 3.5–5.2)
ALBUMIN/GLOB SERPL: 1.3 G/DL
ALP SERPL-CCNC: 88 U/L (ref 39–117)
ALT SERPL W P-5'-P-CCNC: 23 U/L (ref 1–33)
ANION GAP SERPL CALCULATED.3IONS-SCNC: 14.9 MMOL/L
AST SERPL-CCNC: 23 U/L (ref 1–32)
BASOPHILS # BLD AUTO: 0.04 10*3/MM3 (ref 0–0.2)
BASOPHILS NFR BLD AUTO: 0.8 % (ref 0–1.5)
BILIRUB SERPL-MCNC: 0.3 MG/DL (ref 0.1–1.2)
BUN BLD-MCNC: 18 MG/DL (ref 8–23)
BUN/CREAT SERPL: 27.7 (ref 7–25)
CALCIUM SPEC-SCNC: 10.1 MG/DL (ref 8.6–10.5)
CANCER AG15-3 SERPL-ACNC: 23.1 U/ML
CHLORIDE SERPL-SCNC: 101 MMOL/L (ref 98–107)
CO2 SERPL-SCNC: 27.1 MMOL/L (ref 22–29)
CREAT BLD-MCNC: 0.65 MG/DL (ref 0.57–1)
DEPRECATED RDW RBC AUTO: 43.2 FL (ref 37–54)
EOSINOPHIL # BLD AUTO: 0.07 10*3/MM3 (ref 0–0.7)
EOSINOPHIL NFR BLD AUTO: 1.4 % (ref 0.3–6.2)
ERYTHROCYTE [DISTWIDTH] IN BLOOD BY AUTOMATED COUNT: 13.1 % (ref 11.7–13)
GFR SERPL CREATININE-BSD FRML MDRD: 90 ML/MIN/1.73
GLOBULIN UR ELPH-MCNC: 3.6 GM/DL
GLUCOSE BLD-MCNC: 120 MG/DL (ref 65–99)
HCT VFR BLD AUTO: 40 % (ref 35.6–45.5)
HGB BLD-MCNC: 13.2 G/DL (ref 11.9–15.5)
IMM GRANULOCYTES # BLD AUTO: 0.02 10*3/MM3 (ref 0–0.03)
IMM GRANULOCYTES NFR BLD AUTO: 0.4 % (ref 0–0.5)
LYMPHOCYTES # BLD AUTO: 1.17 10*3/MM3 (ref 0.9–4.8)
LYMPHOCYTES NFR BLD AUTO: 22.6 % (ref 19.6–45.3)
MCH RBC QN AUTO: 30.1 PG (ref 26.9–32)
MCHC RBC AUTO-ENTMCNC: 33 G/DL (ref 32.4–36.3)
MCV RBC AUTO: 91.1 FL (ref 80.5–98.2)
MONOCYTES # BLD AUTO: 0.42 10*3/MM3 (ref 0.2–1.2)
MONOCYTES NFR BLD AUTO: 8.1 % (ref 5–12)
NEUTROPHILS # BLD AUTO: 3.45 10*3/MM3 (ref 1.9–8.1)
NEUTROPHILS NFR BLD AUTO: 66.7 % (ref 42.7–76)
NRBC BLD AUTO-RTO: 0 /100 WBC (ref 0–0)
PLATELET # BLD AUTO: 189 10*3/MM3 (ref 140–500)
PMV BLD AUTO: 9.4 FL (ref 6–12)
POTASSIUM BLD-SCNC: 4.1 MMOL/L (ref 3.5–5.2)
PROT SERPL-MCNC: 8.2 G/DL (ref 6–8.5)
RBC # BLD AUTO: 4.39 10*6/MM3 (ref 3.9–5.2)
SODIUM BLD-SCNC: 143 MMOL/L (ref 136–145)
WBC NRBC COR # BLD: 5.17 10*3/MM3 (ref 4.5–10.7)

## 2018-12-31 PROCEDURE — 80053 COMPREHEN METABOLIC PANEL: CPT | Performed by: INTERNAL MEDICINE

## 2018-12-31 PROCEDURE — 36415 COLL VENOUS BLD VENIPUNCTURE: CPT

## 2018-12-31 PROCEDURE — 99214 OFFICE O/P EST MOD 30 MIN: CPT | Performed by: INTERNAL MEDICINE

## 2018-12-31 PROCEDURE — 86300 IMMUNOASSAY TUMOR CA 15-3: CPT | Performed by: INTERNAL MEDICINE

## 2018-12-31 PROCEDURE — 85025 COMPLETE CBC W/AUTO DIFF WBC: CPT | Performed by: INTERNAL MEDICINE

## 2018-12-31 NOTE — PROGRESS NOTES
Subjective     REASONS FOR FOLLOWUP:   1. Chest wall recurrence of breast cancer, ER/WA positive, HER-2 negative, grade 2 with no evidence of distant spread. Arimidex started in 2014.   2. T1cN0, grade 1, ER/WA positive breast cancer treated with CMF and 5 years of tamoxifen in .   3. Moderate fatigue from Arimidex.   4. Response clinically and by CT scan on 2015.   5. Faslodex added to Arimidex in 2015 to optimize response for surgery.   6. Decision made to forego surgery and do radiation, continue Arimidex in 2015.        History of Present Illness  patient is a 65-year-old lady with a locally recurrent breast cancer 19 years from diagnosis currently on Arimidex    She was worked in because she noticed some red lesions on her chest wall where she had radiation and is very concerned about recurrence and she is here to show this to me    She is exercising and dieting and her depression and attitude are much better.  She is tolerating Arimidex without any problems.   Her tumor markers remain normal andf  except for mild thrombocytopenia she has no lab abnormalities currently.      She has no cough or symptoms from her radiation scarring in the left lung apex.  We have decided to do CAT scans for her next visit and has been a year and a half we've looked at her she is clinically stable and stable tumor markers       PAST MEDICAL HISTORY: Significant for diabetes type 2, hypercholesterolemia and peripheral mariaelena ropathy from her diabetes for which he is on very low-dose Neurontin. She has had no heart attack, strokes or blood clots, no peptic ulcer disease, infectious mononucleosis, hepatitis, thyroid problems, or anemia.   PAST SURGICAL HISTORY: None except left breast mastectomy .     OB/GYN HISTORY: Menarche was at age 12, menopause in her late 40s induced by CMF chemotherapy. She is  3, para 2 with one miscarriage. First childbirth was at age 25. She did not breast-feed. Never took  hormonal replacement.     ONCOLOGIC HISTORY: History of previous dates can be found in a separate document.   The patient was seen on 2015 after getting a 2nd opinion of Dr. Louis Brooks and seeing her surgeon in [place] again. Her circulating tumor cells were present and we felt this argued against doing surgery and we opted to do radiation to the chest wall. Continue Arimidex for the time being and for progression switch to Faslodex and Ibrance. CT scans of the chest, abdomen, pelvis, and bone scan dated 10/07/2015 showed no evidence of systemic disease and just chest wall disease, which has all respon ded to her hormonal therapy and fatty liver.  Patient seen on 2016 after radiation with a PET scan that shows some radiation lung damage which is PET positive with a low SUV and no other signs of metastatic disease.  Arimidex continued    Her stool sample showed fecal blood and she had a colonoscopy with 3 adenomatous polyps removed - EGD was done and showed H. pylori infection and she was treated for this.  She is at the [employer] exercising with Silver sneakers and losing weight and overall has no complaints      SOCIAL HISTORY: She is . 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 She is a nondrinker.     FAMILY HISTORY: Father  at 59 o f an MI. Mother at 68 of lung cancer and was a smoker. She has paternal grandmother with breast cancer at age 47, a paternal aunt with breast cancer at age 59 and pancreatic cancer at 88. She has a paternal first cousin with breast cancer at 44, another p a ternal cousin with breast cancer at 44, another cousin with breast cancer at 61, and another paternal first cousin with breast cancer at 66. BRCA testing was done on the 44-year-old cousin and was negative. No history of ovarian cancer. She has a paterna l first cousin with colon cancer.     Review of Systems   Constitutional: Negative for fatigue (no issue now  "10/31/2018).   HENT: Negative.    Respiratory: Negative.  Negative for chest tightness and shortness of breath.    Cardiovascular: Negative.  Negative for chest pain.   Gastrointestinal: Negative.  Negative for constipation, diarrhea, nausea and vomiting.   Genitourinary: Negative.    Musculoskeletal: Negative.  Negative for arthralgias and back pain.   Neurological: Negative.  Negative for numbness (no longer am issue 10/31/2018).   Psychiatric/Behavioral: Negative for sleep disturbance. The patient is not nervous/anxious.    All other systems reviewed and are negative.     A comprehensive 14 point review of systems was performed and was negative except as mentioned.    Current Outpatient Medications on File Prior to Visit   Medication Sig Dispense Refill   • anastrozole (ARIMIDEX) 1 MG tablet TAKE 1 TABLET BY MOUTH DAILY. 90 tablet 2   • aspirin 81 MG EC tablet Take 81 mg by mouth Daily.     • Calcium Carbonate-Vitamin D (CALCIUM-D PO) Take  by mouth Daily.     • Cholecalciferol (VITAMIN D) 1000 UNITS tablet Take 2,000 Units by mouth.     • hydrochlorothiazide (HYDRODIURIL) 25 MG tablet TAKE 1 TABLET BY MOUTH EVERY DAY FOR BLOOD PRESSURE  1   • lisinopril (PRINIVIL,ZESTRIL) 40 MG tablet TAKE 1 TABLET BY MOUTH EVERY DAY FOR BLOOD PRESSURE  1   • metFORMIN ER (GLUCOPHAGE-XR) 500 MG 24 hr tablet TAKE 2 TABLETS BY MOUTH DAILY WITH SUPPER FOR DIABETES  1   • rosuvastatin (CRESTOR) 20 MG tablet Take 20 mg by mouth daily.     • TURMERIC PO Take  by mouth Daily.     • Probiotic Product (PROBIOTIC DAILY PO) Take  by mouth Daily.       No current facility-administered medications on file prior to visit.          ALLERGIES:  No Known Allergies    Objective      Vitals:    12/31/18 1053   Height: 158 cm (62.21\")     Current Status 12/31/2018   ECOG score 0       Physical Exam    GENERAL:  Well-developed, well-nourished in no acute distress.   SKIN:  Warm, dry without rashes, purpura or petechiae.  HEAD:  Normocephalic.  EYES: "  Pupils equal, round and reactive to light.  EOMs intact.  Conjunctivae normal.  EARS:  Hearing intact.  NOSE:  Septum midline.  No excoriations or nasal discharge.  MOUTH:  Tongue is well-papillated; no stomatitis or ulcers.  Lips normal.  THROAT:  Oropharynx without lesions or exudates.  NECK:  Supple with good range of motion; no thyromegaly or masses, no JVD.  LYMPHATICS:  No cervical, supraclavicular, axillary or inguinal adenopathy.  CHEST:  Lungs clear to percussion and auscultation. Good airflow.  BREASTS: Right breast with an inverted nipple and no masses-left chest wall with significant radiation skin changes and no residual masses appreciated-redness that she is describing is telangiectasia in the skin from radiation damage  CARDIAC:  Regular rate and rhythm without murmurs, rubs or gallops. Normal S1,S2.  ABDOMEN:  Soft, nontender with no organomegaly or masses.  EXTREMITIES:  No clubbing, cyanosis or edema.  NEUROLOGICAL:  Cranial Nerves II-XII grossly intact.  No focal neurological deficits.  PSYCHIATRIC:  Normal affect and mood.      Physical unchanged on 10/31/18  RECENT LABS:  Hematology WBC   Date Value Ref Range Status   12/31/2018 5.17 4.50 - 10.70 10*3/mm3 Final     RBC   Date Value Ref Range Status   12/31/2018 4.39 3.90 - 5.20 10*6/mm3 Final     Hemoglobin   Date Value Ref Range Status   12/31/2018 13.2 11.9 - 15.5 g/dL Final     Hematocrit   Date Value Ref Range Status   12/31/2018 40.0 35.6 - 45.5 % Final     Platelets   Date Value Ref Range Status   12/31/2018 189 140 - 500 10*3/mm3 Final        CA-15-3= 20            Assessment/Plan   1.  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  Anxiety and depression improved  3.  Significant weight gain with hormonal blockade  currently improving with diet and exercise  4   Numbness in the left upper arm and intrascapular regions?  Related to radiation-better    Plan  Return  as previously scheduled to review CAT scans and tumor markers  Check circulating tumor cells again to see if there are increasing in which case we will add Ibrance            12/31/2018      CC:

## 2019-01-29 ENCOUNTER — HOSPITAL ENCOUNTER (OUTPATIENT)
Dept: NUCLEAR MEDICINE | Facility: HOSPITAL | Age: 69
Discharge: HOME | End: 2019-01-29
Attending: INTERNAL MEDICINE

## 2019-01-29 ENCOUNTER — HOSPITAL ENCOUNTER (OUTPATIENT)
Dept: CT IMAGING | Facility: HOSPITAL | Age: 69
Discharge: HOME | End: 2019-01-29
Attending: INTERNAL MEDICINE | Admitting: INTERNAL MEDICINE

## 2019-01-29 DIAGNOSIS — C50.512 MALIGNANT NEOPLASM OF LOWER-OUTER QUADRANT OF LEFT BREAST OF FEMALE, ESTROGEN RECEPTOR POSITIVE (HCC): ICD-10-CM

## 2019-01-29 DIAGNOSIS — Z17.0 MALIGNANT NEOPLASM OF LOWER-OUTER QUADRANT OF LEFT BREAST OF FEMALE, ESTROGEN RECEPTOR POSITIVE (HCC): ICD-10-CM

## 2019-01-29 LAB — CREAT BLDA-MCNC: 0.8 MG/DL (ref 0.6–1.3)

## 2019-01-29 PROCEDURE — A9503 TC99M MEDRONATE: HCPCS | Performed by: INTERNAL MEDICINE

## 2019-01-29 PROCEDURE — 82565 ASSAY OF CREATININE: CPT

## 2019-01-29 PROCEDURE — 74177 CT ABD & PELVIS W/CONTRAST: CPT

## 2019-01-29 PROCEDURE — 71260 CT THORAX DX C+: CPT

## 2019-01-29 PROCEDURE — 0 TECHNETIUM MEDRONATE KIT: Performed by: INTERNAL MEDICINE

## 2019-01-29 PROCEDURE — 25010000002 IOPAMIDOL 61 % SOLUTION: Performed by: INTERNAL MEDICINE

## 2019-01-29 PROCEDURE — 78306 BONE IMAGING WHOLE BODY: CPT

## 2019-01-29 RX ORDER — TC 99M MEDRONATE 20 MG/10ML
23 INJECTION, POWDER, LYOPHILIZED, FOR SOLUTION INTRAVENOUS
Status: COMPLETED | OUTPATIENT
Start: 2019-01-29 | End: 2019-01-29

## 2019-01-29 RX ADMIN — IOPAMIDOL 85 ML: 612 INJECTION, SOLUTION INTRAVENOUS at 08:19

## 2019-01-29 RX ADMIN — Medication 23 MILLICURIE: at 07:18

## 2019-02-05 ENCOUNTER — LAB (OUTPATIENT)
Dept: LAB | Facility: HOSPITAL | Age: 69
End: 2019-02-05

## 2019-02-05 ENCOUNTER — OFFICE VISIT (OUTPATIENT)
Dept: ONCOLOGY | Facility: CLINIC | Age: 69
End: 2019-02-05

## 2019-02-05 VITALS
DIASTOLIC BLOOD PRESSURE: 68 MMHG | TEMPERATURE: 98.5 F | WEIGHT: 184.1 LBS | RESPIRATION RATE: 16 BRPM | HEIGHT: 62 IN | OXYGEN SATURATION: 96 % | BODY MASS INDEX: 33.88 KG/M2 | SYSTOLIC BLOOD PRESSURE: 135 MMHG | HEART RATE: 80 BPM

## 2019-02-05 DIAGNOSIS — Z09 ENCOUNTER FOR FOLLOW-UP EXAMINATION AFTER COMPLETED TREATMENT FOR CONDITIONS OTHER THAN MALIGNANT NEOPLASM: ICD-10-CM

## 2019-02-05 DIAGNOSIS — C50.512 MALIGNANT NEOPLASM OF LOWER-OUTER QUADRANT OF LEFT BREAST OF FEMALE, ESTROGEN RECEPTOR POSITIVE (HCC): ICD-10-CM

## 2019-02-05 DIAGNOSIS — C50.912 CARCINOMA OF LEFT BREAST METASTATIC TO SKIN (HCC): Primary | ICD-10-CM

## 2019-02-05 DIAGNOSIS — C79.2 CARCINOMA OF LEFT BREAST METASTATIC TO SKIN (HCC): Primary | ICD-10-CM

## 2019-02-05 DIAGNOSIS — Z17.0 MALIGNANT NEOPLASM OF LOWER-OUTER QUADRANT OF LEFT BREAST OF FEMALE, ESTROGEN RECEPTOR POSITIVE (HCC): ICD-10-CM

## 2019-02-05 LAB
BASOPHILS # BLD AUTO: 0.05 10*3/MM3 (ref 0–0.1)
BASOPHILS NFR BLD AUTO: 0.9 % (ref 0–1.1)
DEPRECATED RDW RBC AUTO: 42.2 FL (ref 37–49)
EOSINOPHIL # BLD AUTO: 0.08 10*3/MM3 (ref 0–0.36)
EOSINOPHIL NFR BLD AUTO: 1.4 % (ref 1–5)
ERYTHROCYTE [DISTWIDTH] IN BLOOD BY AUTOMATED COUNT: 13 % (ref 11.7–14.5)
HCT VFR BLD AUTO: 36 % (ref 34–45)
HGB BLD-MCNC: 12.3 G/DL (ref 11.5–14.9)
IMM GRANULOCYTES # BLD AUTO: 0.02 10*3/MM3 (ref 0–0.03)
IMM GRANULOCYTES NFR BLD AUTO: 0.4 % (ref 0–0.5)
LYMPHOCYTES # BLD AUTO: 1.5 10*3/MM3 (ref 1–3.5)
LYMPHOCYTES NFR BLD AUTO: 27.1 % (ref 20–49)
MCH RBC QN AUTO: 30.5 PG (ref 27–33)
MCHC RBC AUTO-ENTMCNC: 34.2 G/DL (ref 32–35)
MCV RBC AUTO: 89.3 FL (ref 83–97)
MONOCYTES # BLD AUTO: 0.6 10*3/MM3 (ref 0.25–0.8)
MONOCYTES NFR BLD AUTO: 10.8 % (ref 4–12)
NEUTROPHILS # BLD AUTO: 3.28 10*3/MM3 (ref 1.5–7)
NEUTROPHILS NFR BLD AUTO: 59.4 % (ref 39–75)
NRBC BLD AUTO-RTO: 0 /100 WBC (ref 0–0)
PLATELET # BLD AUTO: 149 10*3/MM3 (ref 150–375)
PMV BLD AUTO: 10.4 FL (ref 8.9–12.1)
RBC # BLD AUTO: 4.03 10*6/MM3 (ref 3.9–5)
WBC NRBC COR # BLD: 5.53 10*3/MM3 (ref 4–10)

## 2019-02-05 PROCEDURE — 99214 OFFICE O/P EST MOD 30 MIN: CPT | Performed by: INTERNAL MEDICINE

## 2019-02-05 PROCEDURE — 85025 COMPLETE CBC W/AUTO DIFF WBC: CPT | Performed by: INTERNAL MEDICINE

## 2019-02-05 PROCEDURE — 36415 COLL VENOUS BLD VENIPUNCTURE: CPT | Performed by: INTERNAL MEDICINE

## 2019-02-05 NOTE — PROGRESS NOTES
Subjective     REASONS FOR FOLLOWUP:   1. Chest wall recurrence of breast cancer, ER/DC positive, HER-2 negative, grade 2 with no evidence of distant spread. Arimidex started in 2014.   2. T1cN0, grade 1, ER/DC positive breast cancer treated with CMF and 5 years of tamoxifen in .   3. Moderate fatigue from Arimidex.   4. Response clinically and by CT scan on 2015.   5. Faslodex added to Arimidex in 2015 to optimize response for surgery.   6. Decision made to forego surgery and do radiation, continue Arimidex in 2015.        History of Present Illness  patient is a 65-year-old lady with a locally recurrent breast cancer 19 years from diagnosis currently on Arimidex  She is exercising and dieting and her depression and attitude are much better.  She is tolerating Arimidex without any problems.   Her tumor markers remain normal andf  except for mild thrombocytopenia she has no lab abnormalities currently.    She is here to review CAT scans and thankfully there is no evidence of progressive disease and some old scarring in the chest wall and near her previous incisions that have been stable for over 2 years  CA-15-3 is stable at 23    She has no cough or symptoms from her radiation scarring in the left lung apex.        PAST MEDICAL HISTORY: Significant for diabetes type 2, hypercholesterolemia and peripheral mariaelena ropathy from her diabetes for which he is on very low-dose Neurontin. She has had no heart attack, strokes or blood clots, no peptic ulcer disease, infectious mononucleosis, hepatitis, thyroid problems, or anemia.   PAST SURGICAL HISTORY: None except left breast mastectomy .     OB/GYN HISTORY: Menarche was at age 12, menopause in her late 40s induced by CMF chemotherapy. She is  3, para 2 with one miscarriage. First childbirth was at age 25. She did not breast-feed. Never took hormonal replacement.     ONCOLOGIC HISTORY: History of previous dates can be found in a separate  document.   The patient was seen on 2015 after getting a 2nd opinion of Dr. Louis Brooks and seeing her surgeon in [place] again. Her circulating tumor cells were present and we felt this argued against doing surgery and we opted to do radiation to the chest wall. Continue Arimidex for the time being and for progression switch to Faslodex and Ibrance. CT scans of the chest, abdomen, pelvis, and bone scan dated 10/07/2015 showed no evidence of systemic disease and just chest wall disease, which has all respon ded to her hormonal therapy and fatty liver.  Patient seen on 2016 after radiation with a PET scan that shows some radiation lung damage which is PET positive with a low SUV and no other signs of metastatic disease.  Arimidex continued    Her stool sample showed fecal blood and she had a colonoscopy with 3 adenomatous polyps removed - EGD was done and showed H. pylori infection and she was treated for this.  She is at the [employer] exercising with Silver sneakers and losing weight and overall has no complaints      SOCIAL HISTORY: She is . 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 She is a nondrinker.     FAMILY HISTORY: Father  at 59 o f an MI. Mother at 68 of lung cancer and was a smoker. She has paternal grandmother with breast cancer at age 47, a paternal aunt with breast cancer at age 59 and pancreatic cancer at 88. She has a paternal first cousin with breast cancer at 44, another p a ternal cousin with breast cancer at 44, another cousin with breast cancer at 61, and another paternal first cousin with breast cancer at 66. BRCA testing was done on the 44-year-old cousin and was negative. No history of ovarian cancer. She has a paterna l first cousin with colon cancer.     Review of Systems   Constitutional: Negative for chills, fatigue (no issue now 10/31/2018) and fever.   HENT: Negative.    Respiratory: Negative.  Negative for chest tightness  and shortness of breath.    Cardiovascular: Negative.  Negative for chest pain.   Gastrointestinal: Negative.  Negative for constipation, diarrhea, nausea and vomiting.   Genitourinary: Negative.    Musculoskeletal: Negative.  Negative for arthralgias and back pain.   Neurological: Negative.  Negative for dizziness, numbness (no longer am issue 10/31/2018) and headaches.   Psychiatric/Behavioral: Negative for sleep disturbance. The patient is not nervous/anxious.    All other systems reviewed and are negative.     A comprehensive 14 point review of systems was performed and was negative except as mentioned.    Current Outpatient Medications on File Prior to Visit   Medication Sig Dispense Refill   • anastrozole (ARIMIDEX) 1 MG tablet TAKE 1 TABLET BY MOUTH DAILY. 90 tablet 2   • aspirin 81 MG EC tablet Take 81 mg by mouth Daily.     • Calcium Carbonate-Vitamin D (CALCIUM-D PO) Take  by mouth Daily.     • Cholecalciferol (VITAMIN D) 1000 UNITS tablet Take 2,000 Units by mouth.     • hydrochlorothiazide (HYDRODIURIL) 25 MG tablet TAKE 1 TABLET BY MOUTH EVERY DAY FOR BLOOD PRESSURE  1   • lisinopril (PRINIVIL,ZESTRIL) 40 MG tablet TAKE 1 TABLET BY MOUTH EVERY DAY FOR BLOOD PRESSURE  1   • metFORMIN ER (GLUCOPHAGE-XR) 500 MG 24 hr tablet TAKE 2 TABLETS BY MOUTH DAILY WITH SUPPER FOR DIABETES  1   • rosuvastatin (CRESTOR) 20 MG tablet Take 20 mg by mouth daily.     • TURMERIC PO Take  by mouth Daily.       No current facility-administered medications on file prior to visit.          ALLERGIES:  No Known Allergies    Objective      There were no vitals filed for this visit.  Current Status 12/31/2018   ECOG score 0       Physical Exam    GENERAL:  Well-developed, well-nourished in no acute distress.   SKIN:  Warm, dry without rashes, purpura or petechiae.  HEAD:  Normocephalic.  EYES:  Pupils equal, round and reactive to light.  EOMs intact.  Conjunctivae normal.  EARS:  Hearing intact.  NOSE:  Septum midline.  No  excoriations or nasal discharge.  MOUTH:  Tongue is well-papillated; no stomatitis or ulcers.  Lips normal.  THROAT:  Oropharynx without lesions or exudates.  NECK:  Supple with good range of motion; no thyromegaly or masses, no JVD.  LYMPHATICS:  No cervical, supraclavicular, axillary or inguinal adenopathy.  CHEST:  Lungs clear to percussion and auscultation. Good airflow.  BREASTS: Right breast with an inverted nipple and no masses-left chest wall with significant radiation skin changes and no residual masses appreciated-redness that she is describing is telangiectasia in the skin from radiation damage  CARDIAC:  Regular rate and rhythm without murmurs, rubs or gallops. Normal S1,S2.  ABDOMEN:  Soft, nontender with no organomegaly or masses.  EXTREMITIES:  No clubbing, cyanosis or edema.  NEUROLOGICAL:  Cranial Nerves II-XII grossly intact.  No focal neurological deficits.  PSYCHIATRIC:  Normal affect and mood.      Physical unchanged on 10/31/18  RECENT LABS:  Hematology WBC   Date Value Ref Range Status   12/31/2018 5.17 4.50 - 10.70 10*3/mm3 Final     RBC   Date Value Ref Range Status   12/31/2018 4.39 3.90 - 5.20 10*6/mm3 Final     Hemoglobin   Date Value Ref Range Status   12/31/2018 13.2 11.9 - 15.5 g/dL Final     Hematocrit   Date Value Ref Range Status   12/31/2018 40.0 35.6 - 45.5 % Final     Platelets   Date Value Ref Range Status   12/31/2018 189 140 - 500 10*3/mm3 Final        CA-15-3= 23      CT CHEST, ABDOMEN AND PELVIS WITH IV CONTRAST      IMPRESSION:  1.  No significant change when compared to prior exam May 2017 or  convincing evidence for metastatic disease.  2.  Unchanged appearance of the left anterior chest wall where there are  nodular areas of apparent scarring that appear stable.  There has been  left mastectomy and left axillary reggie dissection.  3.  Chronic anteromedial left upper lobe atelectasis and scarring  associated with postradiation change.  4.  Atherosclerotic disease with  mild enlargement of the infrarenal  abdominal aorta measuring 2.8 cm.  5.  Sigmoid diverticulosis without evidence for diverticulitis.      Radiation dose reduction techniques were utilized, including automated  exposure control and exposure modulation based on body size.     This report was finalized on 1/29/2019       WHOLE BODY BONE SCAN   IMPRESSION:  Negative bone scan.     This report was finalized on 1/30/2019       Assessment/Plan   1.  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  Anxiety and depression improved  3.  Significant weight gain with hormonal blockade currently improving with diet and exercise  4   Numbness in the left upper arm and intrascapular regions?  Related to radiation-better    Plan  Return in 4 months for routine evaluation with a DEXA scan      2/5/2019      CC:

## 2019-05-09 DIAGNOSIS — C50.512 MALIGNANT NEOPLASM OF LOWER-OUTER QUADRANT OF LEFT BREAST OF FEMALE, ESTROGEN RECEPTOR POSITIVE (HCC): Primary | ICD-10-CM

## 2019-05-09 DIAGNOSIS — Z17.0 MALIGNANT NEOPLASM OF LOWER-OUTER QUADRANT OF LEFT BREAST OF FEMALE, ESTROGEN RECEPTOR POSITIVE (HCC): Primary | ICD-10-CM

## 2019-05-14 ENCOUNTER — HOSPITAL ENCOUNTER (OUTPATIENT)
Dept: BONE DENSITY | Facility: HOSPITAL | Age: 69
Discharge: HOME | End: 2019-05-14
Admitting: INTERNAL MEDICINE

## 2019-05-14 DIAGNOSIS — C50.912 CARCINOMA OF LEFT BREAST METASTATIC TO SKIN (HCC): ICD-10-CM

## 2019-05-14 DIAGNOSIS — Z09 ENCOUNTER FOR FOLLOW-UP EXAMINATION AFTER COMPLETED TREATMENT FOR CONDITIONS OTHER THAN MALIGNANT NEOPLASM: ICD-10-CM

## 2019-05-14 DIAGNOSIS — C79.2 CARCINOMA OF LEFT BREAST METASTATIC TO SKIN (HCC): ICD-10-CM

## 2019-05-14 PROCEDURE — 77080 DXA BONE DENSITY AXIAL: CPT

## 2019-06-14 ENCOUNTER — LAB (OUTPATIENT)
Dept: LAB | Facility: HOSPITAL | Age: 69
End: 2019-06-14

## 2019-06-14 ENCOUNTER — OFFICE VISIT (OUTPATIENT)
Dept: ONCOLOGY | Facility: CLINIC | Age: 69
End: 2019-06-14

## 2019-06-14 VITALS
OXYGEN SATURATION: 97 % | WEIGHT: 163.6 LBS | BODY MASS INDEX: 30.11 KG/M2 | RESPIRATION RATE: 16 BRPM | HEART RATE: 70 BPM | SYSTOLIC BLOOD PRESSURE: 157 MMHG | TEMPERATURE: 98.2 F | HEIGHT: 62 IN | DIASTOLIC BLOOD PRESSURE: 74 MMHG

## 2019-06-14 DIAGNOSIS — C79.2 CARCINOMA OF LEFT BREAST METASTATIC TO SKIN (HCC): ICD-10-CM

## 2019-06-14 DIAGNOSIS — Z17.0 MALIGNANT NEOPLASM OF LOWER-OUTER QUADRANT OF LEFT BREAST OF FEMALE, ESTROGEN RECEPTOR POSITIVE (HCC): Primary | ICD-10-CM

## 2019-06-14 DIAGNOSIS — C50.912 CARCINOMA OF LEFT BREAST METASTATIC TO SKIN (HCC): ICD-10-CM

## 2019-06-14 DIAGNOSIS — C50.512 MALIGNANT NEOPLASM OF LOWER-OUTER QUADRANT OF LEFT BREAST OF FEMALE, ESTROGEN RECEPTOR POSITIVE (HCC): Primary | ICD-10-CM

## 2019-06-14 LAB
ALBUMIN SERPL-MCNC: 4.3 G/DL (ref 3.5–5.2)
ALBUMIN/GLOB SERPL: 1.4 G/DL (ref 1.1–2.4)
ALP SERPL-CCNC: 75 U/L (ref 38–116)
ALT SERPL W P-5'-P-CCNC: 16 U/L (ref 0–33)
ANION GAP SERPL CALCULATED.3IONS-SCNC: 13.4 MMOL/L
AST SERPL-CCNC: 20 U/L (ref 0–32)
BASOPHILS # BLD AUTO: 0.05 10*3/MM3 (ref 0–0.2)
BASOPHILS NFR BLD AUTO: 0.9 % (ref 0–1.5)
BILIRUB SERPL-MCNC: 0.4 MG/DL (ref 0.2–1.2)
BUN BLD-MCNC: 15 MG/DL (ref 6–20)
BUN/CREAT SERPL: 19 (ref 7.3–30)
CALCIUM SPEC-SCNC: 9.6 MG/DL (ref 8.5–10.2)
CHLORIDE SERPL-SCNC: 101 MMOL/L (ref 98–107)
CO2 SERPL-SCNC: 25.6 MMOL/L (ref 22–29)
CREAT BLD-MCNC: 0.79 MG/DL (ref 0.6–1.1)
DEPRECATED RDW RBC AUTO: 43.8 FL (ref 37–54)
EOSINOPHIL # BLD AUTO: 0.08 10*3/MM3 (ref 0–0.4)
EOSINOPHIL NFR BLD AUTO: 1.4 % (ref 0.3–6.2)
ERYTHROCYTE [DISTWIDTH] IN BLOOD BY AUTOMATED COUNT: 13.1 % (ref 12.3–15.4)
GFR SERPL CREATININE-BSD FRML MDRD: 72 ML/MIN/1.73
GLOBULIN UR ELPH-MCNC: 3.1 GM/DL (ref 1.8–3.5)
GLUCOSE BLD-MCNC: 91 MG/DL (ref 74–124)
HCT VFR BLD AUTO: 38.1 % (ref 34–46.6)
HGB BLD-MCNC: 12.5 G/DL (ref 12–15.9)
IMM GRANULOCYTES # BLD AUTO: 0.01 10*3/MM3 (ref 0–0.05)
IMM GRANULOCYTES NFR BLD AUTO: 0.2 % (ref 0–0.5)
LYMPHOCYTES # BLD AUTO: 1.52 10*3/MM3 (ref 0.7–3.1)
LYMPHOCYTES NFR BLD AUTO: 27 % (ref 19.6–45.3)
MCH RBC QN AUTO: 30.4 PG (ref 26.6–33)
MCHC RBC AUTO-ENTMCNC: 32.8 G/DL (ref 31.5–35.7)
MCV RBC AUTO: 92.7 FL (ref 79–97)
MONOCYTES # BLD AUTO: 0.47 10*3/MM3 (ref 0.1–0.9)
MONOCYTES NFR BLD AUTO: 8.3 % (ref 5–12)
NEUTROPHILS # BLD AUTO: 3.5 10*3/MM3 (ref 1.7–7)
NEUTROPHILS NFR BLD AUTO: 62.2 % (ref 42.7–76)
NRBC BLD AUTO-RTO: 0 /100 WBC (ref 0–0.2)
PLATELET # BLD AUTO: 161 10*3/MM3 (ref 140–450)
PMV BLD AUTO: 9.3 FL (ref 6–12)
POTASSIUM BLD-SCNC: 3.7 MMOL/L (ref 3.5–4.7)
PROT SERPL-MCNC: 7.4 G/DL (ref 6.3–8)
RBC # BLD AUTO: 4.11 10*6/MM3 (ref 3.77–5.28)
SODIUM BLD-SCNC: 140 MMOL/L (ref 134–145)
WBC NRBC COR # BLD: 5.63 10*3/MM3 (ref 3.4–10.8)

## 2019-06-14 PROCEDURE — 99213 OFFICE O/P EST LOW 20 MIN: CPT | Performed by: INTERNAL MEDICINE

## 2019-06-14 PROCEDURE — 80053 COMPREHEN METABOLIC PANEL: CPT | Performed by: INTERNAL MEDICINE

## 2019-06-14 PROCEDURE — 36415 COLL VENOUS BLD VENIPUNCTURE: CPT | Performed by: INTERNAL MEDICINE

## 2019-06-14 PROCEDURE — 85025 COMPLETE CBC W/AUTO DIFF WBC: CPT | Performed by: INTERNAL MEDICINE

## 2019-06-14 NOTE — PROGRESS NOTES
Subjective     REASONS FOR FOLLOWUP:   1. Chest wall recurrence of breast cancer, ER/OK positive, HER-2 negative, grade 2 with no evidence of distant spread. Arimidex started in 2014.   2. T1cN0, grade 1, ER/OK positive breast cancer treated with CMF and 5 years of tamoxifen in .   3. Moderate fatigue from Arimidex.   4. Response clinically and by CT scan on 2015.   5. Faslodex added to Arimidex in 2015 to optimize response for surgery.   6. Decision made to forego surgery and do radiation, continue Arimidex in 2015.        History of Present Illness  patient is a 65-year-old lady with a locally recurrent breast cancer 24 years from diagnosis currently on Arimidex  She is exercising and dieting and her depression and attitude are much better.  She has lost 30 pounds and feels great. she is tolerating Arimidex without any problems.   Her tumor markers remain normal and she has no lab abnormalities currently.      DEXA scan was reviewed and shows fairly normal bone density despite of Arimidex    She has no cough or symptoms from her radiation scarring in the left lung apex.      Mammograms are due in November and we will repeat her CAT scans again at the end of the year      PAST MEDICAL HISTORY: Significant for diabetes type 2, hypercholesterolemia and peripheral mariaelena ropathy from her diabetes for which he is on very low-dose Neurontin. She has had no heart attack, strokes or blood clots, no peptic ulcer disease, infectious mononucleosis, hepatitis, thyroid problems, or anemia.   PAST SURGICAL HISTORY: None except left breast mastectomy .     OB/GYN HISTORY: Menarche was at age 12, menopause in her late 40s induced by CMF chemotherapy. She is  3, para 2 with one miscarriage. First childbirth was at age 25. She did not breast-feed. Never took hormonal replacement.     ONCOLOGIC HISTORY: History of previous dates can be found in a separate document.   The patient was seen on 2015  after getting a 2nd opinion of Dr. Louis Brooks and seeing her surgeon in [place] again. Her circulating tumor cells were present and we felt this argued against doing surgery and we opted to do radiation to the chest wall. Continue Arimidex for the time being and for progression switch to Faslodex and Ibrance. CT scans of the chest, abdomen, pelvis, and bone scan dated 10/07/2015 showed no evidence of systemic disease and just chest wall disease, which has all respon ded to her hormonal therapy and fatty liver.  Patient seen on 2016 after radiation with a PET scan that shows some radiation lung damage which is PET positive with a low SUV and no other signs of metastatic disease.  Arimidex continued    Her stool sample showed fecal blood and she had a colonoscopy with 3 adenomatous polyps removed - EGD was done and showed H. pylori infection and she was treated for this.  She is at the [facility] exercising with Silver sneakers and losing weight and overall has no complaints      SOCIAL HISTORY: She is . 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 She is a nondrinker.     FAMILY HISTORY: Father  at 59 o f an MI. Mother at 68 of lung cancer and was a smoker. She has paternal grandmother with breast cancer at age 47, a paternal aunt with breast cancer at age 59 and pancreatic cancer at 88. She has a paternal first cousin with breast cancer at 44, another p a ternal cousin with breast cancer at 44, another cousin with breast cancer at 61, and another paternal first cousin with breast cancer at 66. BRCA testing was done on the 44-year-old cousin and was negative. No history of ovarian cancer. She has a paterna l first cousin with colon cancer.     Review of Systems   Constitutional: Positive for unexpected weight change (Intentional 30 pound weight loss). Negative for chills, fatigue (no issue now 10/31/2018) and fever.   HENT: Negative.    Respiratory: Negative.   "Negative for chest tightness and shortness of breath.    Cardiovascular: Negative.  Negative for chest pain.   Gastrointestinal: Negative.  Negative for constipation, diarrhea, nausea and vomiting.   Genitourinary: Negative.    Musculoskeletal: Negative.  Negative for arthralgias and back pain.   Neurological: Negative.  Negative for dizziness, numbness (no longer am issue 10/31/2018) and headaches.   Psychiatric/Behavioral: Negative for sleep disturbance. The patient is not nervous/anxious.    All other systems reviewed and are negative.     A comprehensive 14 point review of systems was performed and was negative except as mentioned.    Current Outpatient Medications on File Prior to Visit   Medication Sig Dispense Refill   • anastrozole (ARIMIDEX) 1 MG tablet TAKE 1 TABLET BY MOUTH DAILY. 90 tablet 2   • Calcium Carbonate-Vitamin D (CALCIUM-D PO) Take  by mouth Daily.     • Cholecalciferol (VITAMIN D) 1000 UNITS tablet Take 2,000 Units by mouth.     • lisinopril (PRINIVIL,ZESTRIL) 40 MG tablet TAKE 1 TABLET BY MOUTH EVERY DAY FOR BLOOD PRESSURE  1   • metFORMIN ER (GLUCOPHAGE-XR) 500 MG 24 hr tablet TAKE 2 TABLETS BY MOUTH DAILY WITH SUPPER FOR DIABETES  1   • rosuvastatin (CRESTOR) 20 MG tablet Take 20 mg by mouth daily.     • hydrochlorothiazide (HYDRODIURIL) 25 MG tablet TAKE 1 TABLET BY MOUTH EVERY DAY FOR BLOOD PRESSURE  1   • [DISCONTINUED] aspirin 81 MG EC tablet Take 81 mg by mouth Daily.     • [DISCONTINUED] TURMERIC PO Take  by mouth Daily.       No current facility-administered medications on file prior to visit.          ALLERGIES:  No Known Allergies    Objective      Vitals:    06/14/19 1420   BP: 157/74   Pulse: 70   Resp: 16   Temp: 98.2 °F (36.8 °C)   SpO2: 97%   Weight: 74.2 kg (163 lb 9.6 oz)   Height: 158 cm (62.21\")   PainSc: 0-No pain     Current Status 6/14/2019   ECOG score 0       Physical Exam    GENERAL:  Well-developed, well-nourished in no acute distress.   SKIN:  Warm, dry without " rashes, purpura or petechiae.  HEAD:  Normocephalic.  EYES:  Pupils equal, round and reactive to light.  EOMs intact.  Conjunctivae normal.  EARS:  Hearing intact.  NOSE:  Septum midline.  No excoriations or nasal discharge.  MOUTH:  Tongue is well-papillated; no stomatitis or ulcers.  Lips normal.  THROAT:  Oropharynx without lesions or exudates.  NECK:  Supple with good range of motion; no thyromegaly or masses, no JVD.  LYMPHATICS:  No cervical, supraclavicular, axillary or inguinal adenopathy.  CHEST:  Lungs clear to percussion and auscultation. Good airflow.  BREASTS: Right breast with an inverted nipple and no masses-left chest wall with significant radiation skin changes and no residual masses appreciated-redness that she is describing is telangiectasia in the skin from radiation damage  CARDIAC:  Regular rate and rhythm without murmurs, rubs or gallops. Normal S1,S2.  ABDOMEN:  Soft, nontender with no organomegaly or masses.  EXTREMITIES:  No clubbing, cyanosis or edema.  NEUROLOGICAL:  Cranial Nerves II-XII grossly intact.  No focal neurological deficits.  PSYCHIATRIC:  Normal affect and mood.      Physical unchanged on 10/31/18  RECENT LABS:  Hematology WBC   Date Value Ref Range Status   06/14/2019 5.63 3.40 - 10.80 10*3/mm3 Final     RBC   Date Value Ref Range Status   06/14/2019 4.11 3.77 - 5.28 10*6/mm3 Final     Hemoglobin   Date Value Ref Range Status   06/14/2019 12.5 12.0 - 15.9 g/dL Final     Hematocrit   Date Value Ref Range Status   06/14/2019 38.1 34.0 - 46.6 % Final     Platelets   Date Value Ref Range Status   06/14/2019 161 140 - 450 10*3/mm3 Final        CA-15-3= 23      CT CHEST, ABDOMEN AND PELVIS WITH IV CONTRAST      IMPRESSION:  1.  No significant change when compared to prior exam May 2017 or  convincing evidence for metastatic disease.  2.  Unchanged appearance of the left anterior chest wall where there are  nodular areas of apparent scarring that appear stable.  There has  been  left mastectomy and left axillary reggie dissection.  3.  Chronic anteromedial left upper lobe atelectasis and scarring  associated with postradiation change.  4.  Atherosclerotic disease with mild enlargement of the infrarenal  abdominal aorta measuring 2.8 cm.  5.  Sigmoid diverticulosis without evidence for diverticulitis.      Radiation dose reduction techniques were utilized, including automated  exposure control and exposure modulation based on body size.     This report was finalized on 1/29/2019       WHOLE BODY BONE SCAN   IMPRESSION:  Negative bone scan.     This report was finalized on 1/30/2019     BONE MINERAL DENSITOMETRY  IMPRESSION:  Bone density is normal though the bone density of the left  femoral neck is at the lower limits of normal.     This report was finalized on 5/14/2019       Assessment/Plan   1.  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  Anxiety and depression improved  3.  Significant weight gain with hormonal blockade currently improving with diet and exercise 30 pound weight loss in 2019  4   Numbness in the left upper arm and intrascapular regions?  Related to radiation-better    Plan  Return in 6 months for routine evaluation mammography in November and CAT scans in December 6/14/2019      CC:

## 2019-07-24 RX ORDER — ANASTROZOLE 1 MG/1
TABLET ORAL
Qty: 90 TABLET | Refills: 2 | Status: SHIPPED | OUTPATIENT
Start: 2019-07-24 | End: 2020-07-27

## 2019-11-18 ENCOUNTER — APPOINTMENT (OUTPATIENT)
Dept: WOMENS IMAGING | Facility: HOSPITAL | Age: 69
End: 2019-11-18

## 2019-11-18 PROCEDURE — 77063 BREAST TOMOSYNTHESIS BI: CPT | Performed by: RADIOLOGY

## 2019-11-18 PROCEDURE — MDREVIEWSP: Performed by: RADIOLOGY

## 2019-11-18 PROCEDURE — 77067 SCR MAMMO BI INCL CAD: CPT | Performed by: RADIOLOGY

## 2020-01-03 ENCOUNTER — HOSPITAL ENCOUNTER (OUTPATIENT)
Dept: NUCLEAR MEDICINE | Facility: HOSPITAL | Age: 70
Discharge: HOME | End: 2020-01-03

## 2020-01-03 ENCOUNTER — HOSPITAL ENCOUNTER (OUTPATIENT)
Dept: CT IMAGING | Facility: HOSPITAL | Age: 70
Discharge: HOME | End: 2020-01-03
Admitting: INTERNAL MEDICINE

## 2020-01-03 DIAGNOSIS — Z17.0 MALIGNANT NEOPLASM OF LOWER-OUTER QUADRANT OF LEFT BREAST OF FEMALE, ESTROGEN RECEPTOR POSITIVE (HCC): ICD-10-CM

## 2020-01-03 DIAGNOSIS — C50.512 MALIGNANT NEOPLASM OF LOWER-OUTER QUADRANT OF LEFT BREAST OF FEMALE, ESTROGEN RECEPTOR POSITIVE (HCC): ICD-10-CM

## 2020-01-03 LAB — CREAT BLDA-MCNC: 0.8 MG/DL (ref 0.6–1.3)

## 2020-01-03 PROCEDURE — 78306 BONE IMAGING WHOLE BODY: CPT

## 2020-01-03 PROCEDURE — 82565 ASSAY OF CREATININE: CPT

## 2020-01-03 PROCEDURE — 0 TECHNETIUM MEDRONATE KIT: Performed by: INTERNAL MEDICINE

## 2020-01-03 PROCEDURE — 71260 CT THORAX DX C+: CPT

## 2020-01-03 PROCEDURE — 74177 CT ABD & PELVIS W/CONTRAST: CPT

## 2020-01-03 PROCEDURE — 25010000002 IOPAMIDOL 61 % SOLUTION: Performed by: INTERNAL MEDICINE

## 2020-01-03 PROCEDURE — A9503 TC99M MEDRONATE: HCPCS | Performed by: INTERNAL MEDICINE

## 2020-01-03 RX ORDER — TC 99M MEDRONATE 20 MG/10ML
22.7 INJECTION, POWDER, LYOPHILIZED, FOR SOLUTION INTRAVENOUS
Status: COMPLETED | OUTPATIENT
Start: 2020-01-03 | End: 2020-01-03

## 2020-01-03 RX ADMIN — Medication 22.7 MILLICURIE: at 08:54

## 2020-01-03 RX ADMIN — IOPAMIDOL 95 ML: 612 INJECTION, SOLUTION INTRAVENOUS at 09:52

## 2020-01-07 NOTE — PROGRESS NOTES
Subjective     REASONS FOR FOLLOWUP:   1. Chest wall recurrence of breast cancer, ER/MN positive, HER-2 negative, grade 2 with no evidence of distant spread. Arimidex started in 2014.   2. T1cN0, grade 1, ER/MN positive breast cancer treated with CMF and 5 years of tamoxifen in .   3. Moderate fatigue from Arimidex.   4. Response clinically and by CT scan on 2015.   5. Faslodex added to Arimidex in 2015 to optimize response for surgery.   6. Decision made to forego surgery and do radiation, continue Arimidex in 2015.        History of Present Illness  patient is a 65-year-old lady with a locally recurrent breast cancer 24 years from diagnosis currently on Arimidex since 2014-5 years ago  She is exercising and dieting and her depression and attitude are much better.  She has lost 30 pounds and feels great. she is tolerating Arimidex without any problems.   Her tumor markers remain normal and she has no lab abnormalities currently.      CAT scans of the chest abdomen pelvis were done and were totally negative mammogram was also negative    She has no cough or symptoms from her radiation scarring in the left lung apex.        PAST MEDICAL HISTORY: Significant for diabetes type 2, hypercholesterolemia and peripheral mariaelena ropathy from her diabetes for which he is on very low-dose Neurontin. She has had no heart attack, strokes or blood clots, no peptic ulcer disease, infectious mononucleosis, hepatitis, thyroid problems, or anemia.   PAST SURGICAL HISTORY: None except left breast mastectomy .     OB/GYN HISTORY: Menarche was at age 12, menopause in her late 40s induced by CMF chemotherapy. She is  3, para 2 with one miscarriage. First childbirth was at age 25. She did not breast-feed. Never took hormonal replacement.     ONCOLOGIC HISTORY: History of previous dates can be found in a separate document.   The patient was seen on 2015 after getting a 2nd opinion of Dr. Myers  Cecilia and seeing her surgeon in [place] again. Her circulating tumor cells were present and we felt this argued against doing surgery and we opted to do radiation to the chest wall. Continue Arimidex for the time being and for progression switch to Faslodex and Ibrance. CT scans of the chest, abdomen, pelvis, and bone scan dated 10/07/2015 showed no evidence of systemic disease and just chest wall disease, which has all respon ded to her hormonal therapy and fatty liver.  Patient seen on 2016 after radiation with a PET scan that shows some radiation lung damage which is PET positive with a low SUV and no other signs of metastatic disease.  Arimidex continued    Her stool sample showed fecal blood and she had a colonoscopy with 3 adenomatous polyps removed - EGD was done and showed H. pylori infection and she was treated for this.  She is at the [facility] exercising with Silver sneakers and losing weight and overall has no complaints      SOCIAL HISTORY: She is . 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 She is a nondrinker.     FAMILY HISTORY: Father  at 59 o f an MI. Mother at 68 of lung cancer and was a smoker. She has paternal grandmother with breast cancer at age 47, a paternal aunt with breast cancer at age 59 and pancreatic cancer at 88. She has a paternal first cousin with breast cancer at 44, another p a ternal cousin with breast cancer at 44, another cousin with breast cancer at 61, and another paternal first cousin with breast cancer at 66. BRCA testing was done on the 44-year-old cousin and was negative. No history of ovarian cancer. She has a paterna l first cousin with colon cancer.     Review of Systems   Constitutional: Negative for chills, fatigue (no issue now 10/31/2018), fever and unexpected weight change (stable 2020).   HENT: Negative.    Respiratory: Negative.  Negative for chest tightness and shortness of breath.    Cardiovascular:  "Negative.  Negative for chest pain.   Gastrointestinal: Negative.  Negative for constipation, diarrhea, nausea and vomiting.   Genitourinary: Negative.    Musculoskeletal: Negative.  Negative for arthralgias and back pain.   Neurological: Negative.  Negative for dizziness, numbness (no longer am issue 10/31/2018) and headaches.   Psychiatric/Behavioral: Negative for sleep disturbance. The patient is not nervous/anxious.    All other systems reviewed and are negative.     A comprehensive 14 point review of systems was performed and was negative except as mentioned.    Current Outpatient Medications on File Prior to Visit   Medication Sig Dispense Refill   • anastrozole (ARIMIDEX) 1 MG tablet TAKE 1 TABLET BY MOUTH DAILY. 90 tablet 2   • Calcium Carbonate-Vitamin D (CALCIUM-D PO) Take  by mouth Daily.     • Cholecalciferol (VITAMIN D) 1000 UNITS tablet Take 2,000 Units by mouth.     • lisinopril (PRINIVIL,ZESTRIL) 40 MG tablet TAKE 1 TABLET BY MOUTH EVERY DAY FOR BLOOD PRESSURE  1   • rosuvastatin (CRESTOR) 20 MG tablet Take 20 mg by mouth daily.     • [DISCONTINUED] hydrochlorothiazide (HYDRODIURIL) 25 MG tablet TAKE 1 TABLET BY MOUTH EVERY DAY FOR BLOOD PRESSURE  1   • [DISCONTINUED] metFORMIN ER (GLUCOPHAGE-XR) 500 MG 24 hr tablet TAKE 2 TABLETS BY MOUTH DAILY WITH SUPPER FOR DIABETES  1     No current facility-administered medications on file prior to visit.          ALLERGIES:  No Known Allergies    Objective      Vitals:    01/08/20 1355   BP: 177/88   Pulse: 79   Resp: 16   Temp: 98.2 °F (36.8 °C)   SpO2: 98%   Weight: 77.7 kg (171 lb 4.8 oz)   Height: 158 cm (62.21\")   PainSc: 0-No pain     Current Status 1/8/2020   ECOG score 0       Physical Exam    GENERAL:  Well-developed, well-nourished in no acute distress.   SKIN:  Warm, dry without rashes, purpura or petechiae.  HEAD:  Normocephalic.  EYES:  Pupils equal, round and reactive to light.  EOMs intact.  Conjunctivae normal.  EARS:  Hearing intact.  NOSE:  " Septum midline.  No excoriations or nasal discharge.  MOUTH:  Tongue is well-papillated; no stomatitis or ulcers.  Lips normal.  THROAT:  Oropharynx without lesions or exudates.  NECK:  Supple with good range of motion; no thyromegaly or masses, no JVD.  LYMPHATICS:  No cervical, supraclavicular, axillary or inguinal adenopathy.  CHEST:  Lungs clear to percussion and auscultation. Good airflow.  BREASTS: Right breast with an inverted nipple and no masses-left chest wall with significant radiation skin changes and no residual masses appreciated-redness that she is describing is telangiectasia in the skin from radiation damage  CARDIAC:  Regular rate and rhythm without murmurs, rubs or gallops. Normal S1,S2.  ABDOMEN:  Soft, nontender with no organomegaly or masses.  EXTREMITIES:  No clubbing, cyanosis or edema.  NEUROLOGICAL:  Cranial Nerves II-XII grossly intact.  No focal neurological deficits.  PSYCHIATRIC:  Normal affect and mood.      Physical unchanged on 10/31/18  RECENT LABS:  Hematology WBC   Date Value Ref Range Status   01/08/2020 6.21 3.40 - 10.80 10*3/mm3 Final     RBC   Date Value Ref Range Status   01/08/2020 4.26 3.77 - 5.28 10*6/mm3 Final     Hemoglobin   Date Value Ref Range Status   01/08/2020 13.2 12.0 - 15.9 g/dL Final     Hematocrit   Date Value Ref Range Status   01/08/2020 39.9 34.0 - 46.6 % Final     Platelets   Date Value Ref Range Status   01/08/2020 161 140 - 450 10*3/mm3 Final        CA-15-3= pending    CT CHEST, ABDOMEN AND PELVIS WITH IV CONTRAST      IMPRESSION:  1.  No significant change when compared to prior exam May 2017 or  convincing evidence for metastatic disease.  2.  Unchanged appearance of the left anterior chest wall where there are  nodular areas of apparent scarring that appear stable.  There has been  left mastectomy and left axillary reggie dissection.  3.  Chronic anteromedial left upper lobe atelectasis and scarring  associated with postradiation change.  4.   Atherosclerotic disease with mild enlargement of the infrarenal  abdominal aorta measuring 2.8 cm.  5.  Sigmoid diverticulosis without evidence for diverticulitis.      Radiation dose reduction techniques were utilized, including automated  exposure control and exposure modulation based on body size.     This report was finalized on 1/29/2019       WHOLE BODY BONE SCAN   IMPRESSION:  Negative bone scan.     This report was finalized on 1/30/2019     BONE MINERAL DENSITOMETRY  IMPRESSION:  Bone density is normal though the bone density of the left  femoral neck is at the lower limits of normal.     This report was finalized on 5/14/2019     WHOLE BODY BONE SCAN   IMPRESSION:  Negative bone scan.     This report was finalized on 1/3/2020 6:15 PM by Dr. Dino Magaña M.D.    CT CHEST, ABDOMEN AND PELVIS WITH IV CONTRAST   IMPRESSION:  1. No change compared to prior exam 01/29/2019. No change in appearance  of the left anterior chest wall where there are areas of apparent  scarring. Previous left mastectomy and left axillary reggie dissection.  2. Chronic medial left upper lobe scarring attributed to post radiation  change.  3. 2, sub-4 mm left upper lobe nodules without change.  4. Sigmoid diverticulosis without evidence for diverticulitis.  5. Atherosclerotic disease with mild enlargement of the infrarenal  abdominal aorta measuring 2.8 cm.     Radiation dose reduction techniques were utilized, including automated  exposure control and exposure modulation based on body size.     This report was finalized on 1/6/2020 2:39 PM by Dr. Syed Tinajero M.D.            Assessment/Plan   1.  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  Anxiety and  depression improved  3.  Significant weight gain with hormonal blockade currently improving with diet and exercise 30 pound weight loss in 2019  4   Numbness in the left upper arm and intrascapular regions?  Related to radiation-better    Plan  Return in 6 months for routine evaluation     1/8/2020      CC:

## 2020-01-08 ENCOUNTER — OFFICE VISIT (OUTPATIENT)
Dept: ONCOLOGY | Facility: CLINIC | Age: 70
End: 2020-01-08

## 2020-01-08 ENCOUNTER — LAB (OUTPATIENT)
Dept: LAB | Facility: HOSPITAL | Age: 70
End: 2020-01-08

## 2020-01-08 VITALS
HEIGHT: 62 IN | SYSTOLIC BLOOD PRESSURE: 177 MMHG | BODY MASS INDEX: 31.52 KG/M2 | OXYGEN SATURATION: 98 % | TEMPERATURE: 98.2 F | HEART RATE: 79 BPM | RESPIRATION RATE: 16 BRPM | DIASTOLIC BLOOD PRESSURE: 88 MMHG | WEIGHT: 171.3 LBS

## 2020-01-08 DIAGNOSIS — Z17.0 MALIGNANT NEOPLASM OF LOWER-OUTER QUADRANT OF LEFT BREAST OF FEMALE, ESTROGEN RECEPTOR POSITIVE (HCC): ICD-10-CM

## 2020-01-08 DIAGNOSIS — C50.912 CARCINOMA OF LEFT BREAST METASTATIC TO SKIN (HCC): Primary | ICD-10-CM

## 2020-01-08 DIAGNOSIS — C79.2 CARCINOMA OF LEFT BREAST METASTATIC TO SKIN (HCC): Primary | ICD-10-CM

## 2020-01-08 DIAGNOSIS — C50.512 MALIGNANT NEOPLASM OF LOWER-OUTER QUADRANT OF LEFT BREAST OF FEMALE, ESTROGEN RECEPTOR POSITIVE (HCC): ICD-10-CM

## 2020-01-08 LAB
ALBUMIN SERPL-MCNC: 4.4 G/DL (ref 3.5–5.2)
ALBUMIN/GLOB SERPL: 1.3 G/DL (ref 1.1–2.4)
ALP SERPL-CCNC: 93 U/L (ref 38–116)
ALT SERPL W P-5'-P-CCNC: 17 U/L (ref 0–33)
ANION GAP SERPL CALCULATED.3IONS-SCNC: 11.8 MMOL/L (ref 5–15)
AST SERPL-CCNC: 23 U/L (ref 0–32)
BASOPHILS # BLD AUTO: 0.04 10*3/MM3 (ref 0–0.2)
BASOPHILS NFR BLD AUTO: 0.6 % (ref 0–1.5)
BILIRUB SERPL-MCNC: 0.5 MG/DL (ref 0.2–1.2)
BUN BLD-MCNC: 16 MG/DL (ref 6–20)
BUN/CREAT SERPL: 23.2 (ref 7.3–30)
CALCIUM SPEC-SCNC: 9.7 MG/DL (ref 8.5–10.2)
CANCER AG15-3 SERPL-ACNC: 23.1 U/ML
CHLORIDE SERPL-SCNC: 99 MMOL/L (ref 98–107)
CO2 SERPL-SCNC: 28.2 MMOL/L (ref 22–29)
CREAT BLD-MCNC: 0.69 MG/DL (ref 0.6–1.1)
DEPRECATED RDW RBC AUTO: 44.8 FL (ref 37–54)
EOSINOPHIL # BLD AUTO: 0.08 10*3/MM3 (ref 0–0.4)
EOSINOPHIL NFR BLD AUTO: 1.3 % (ref 0.3–6.2)
ERYTHROCYTE [DISTWIDTH] IN BLOOD BY AUTOMATED COUNT: 13.1 % (ref 12.3–15.4)
GFR SERPL CREATININE-BSD FRML MDRD: 84 ML/MIN/1.73
GLOBULIN UR ELPH-MCNC: 3.5 GM/DL (ref 1.8–3.5)
GLUCOSE BLD-MCNC: 107 MG/DL (ref 74–124)
HCT VFR BLD AUTO: 39.9 % (ref 34–46.6)
HGB BLD-MCNC: 13.2 G/DL (ref 12–15.9)
IMM GRANULOCYTES # BLD AUTO: 0.02 10*3/MM3 (ref 0–0.05)
IMM GRANULOCYTES NFR BLD AUTO: 0.3 % (ref 0–0.5)
LYMPHOCYTES # BLD AUTO: 1.38 10*3/MM3 (ref 0.7–3.1)
LYMPHOCYTES NFR BLD AUTO: 22.2 % (ref 19.6–45.3)
MCH RBC QN AUTO: 31 PG (ref 26.6–33)
MCHC RBC AUTO-ENTMCNC: 33.1 G/DL (ref 31.5–35.7)
MCV RBC AUTO: 93.7 FL (ref 79–97)
MONOCYTES # BLD AUTO: 0.46 10*3/MM3 (ref 0.1–0.9)
MONOCYTES NFR BLD AUTO: 7.4 % (ref 5–12)
NEUTROPHILS # BLD AUTO: 4.23 10*3/MM3 (ref 1.7–7)
NEUTROPHILS NFR BLD AUTO: 68.2 % (ref 42.7–76)
NRBC BLD AUTO-RTO: 0 /100 WBC (ref 0–0.2)
PLATELET # BLD AUTO: 161 10*3/MM3 (ref 140–450)
PMV BLD AUTO: 9 FL (ref 6–12)
POTASSIUM BLD-SCNC: 4 MMOL/L (ref 3.5–4.7)
PROT SERPL-MCNC: 7.9 G/DL (ref 6.3–8)
RBC # BLD AUTO: 4.26 10*6/MM3 (ref 3.77–5.28)
SODIUM BLD-SCNC: 139 MMOL/L (ref 134–145)
WBC NRBC COR # BLD: 6.21 10*3/MM3 (ref 3.4–10.8)

## 2020-01-08 PROCEDURE — 99214 OFFICE O/P EST MOD 30 MIN: CPT | Performed by: INTERNAL MEDICINE

## 2020-01-08 PROCEDURE — 86300 IMMUNOASSAY TUMOR CA 15-3: CPT | Performed by: INTERNAL MEDICINE

## 2020-01-08 PROCEDURE — 85025 COMPLETE CBC W/AUTO DIFF WBC: CPT

## 2020-01-08 PROCEDURE — 36415 COLL VENOUS BLD VENIPUNCTURE: CPT

## 2020-01-08 PROCEDURE — 80053 COMPREHEN METABOLIC PANEL: CPT

## 2020-05-27 VITALS
SYSTOLIC BLOOD PRESSURE: 131 MMHG | DIASTOLIC BLOOD PRESSURE: 57 MMHG | HEIGHT: 62 IN | DIASTOLIC BLOOD PRESSURE: 61 MMHG | DIASTOLIC BLOOD PRESSURE: 94 MMHG | SYSTOLIC BLOOD PRESSURE: 163 MMHG | TEMPERATURE: 96.4 F | HEART RATE: 64 BPM | HEART RATE: 65 BPM | HEART RATE: 69 BPM | OXYGEN SATURATION: 100 % | DIASTOLIC BLOOD PRESSURE: 63 MMHG | HEART RATE: 66 BPM | OXYGEN SATURATION: 99 % | HEART RATE: 80 BPM | HEART RATE: 70 BPM | SYSTOLIC BLOOD PRESSURE: 104 MMHG | SYSTOLIC BLOOD PRESSURE: 114 MMHG | SYSTOLIC BLOOD PRESSURE: 179 MMHG | DIASTOLIC BLOOD PRESSURE: 51 MMHG | SYSTOLIC BLOOD PRESSURE: 146 MMHG | RESPIRATION RATE: 9 BRPM | TEMPERATURE: 96 F | SYSTOLIC BLOOD PRESSURE: 117 MMHG | SYSTOLIC BLOOD PRESSURE: 129 MMHG | DIASTOLIC BLOOD PRESSURE: 55 MMHG | DIASTOLIC BLOOD PRESSURE: 53 MMHG | HEART RATE: 76 BPM | DIASTOLIC BLOOD PRESSURE: 48 MMHG | OXYGEN SATURATION: 98 % | RESPIRATION RATE: 20 BRPM | RESPIRATION RATE: 16 BRPM | SYSTOLIC BLOOD PRESSURE: 124 MMHG | RESPIRATION RATE: 10 BRPM | RESPIRATION RATE: 7 BRPM | DIASTOLIC BLOOD PRESSURE: 82 MMHG | RESPIRATION RATE: 8 BRPM | OXYGEN SATURATION: 97 % | RESPIRATION RATE: 6 BRPM | WEIGHT: 178 LBS | HEART RATE: 71 BPM

## 2020-06-01 PROBLEM — Z86.010 SURVEILLANCE DUE TO PRIOR COLONIC NEOPLASIA: Status: ACTIVE | Noted: 2020-06-02

## 2020-06-02 ENCOUNTER — OFFICE (AMBULATORY)
Dept: URBAN - METROPOLITAN AREA PATHOLOGY 4 | Facility: PATHOLOGY | Age: 70
End: 2020-06-02

## 2020-06-02 ENCOUNTER — AMBULATORY SURGICAL CENTER (AMBULATORY)
Dept: URBAN - METROPOLITAN AREA SURGERY 17 | Facility: SURGERY | Age: 70
End: 2020-06-02

## 2020-06-02 DIAGNOSIS — K57.30 DIVERTICULOSIS OF LARGE INTESTINE WITHOUT PERFORATION OR ABS: ICD-10-CM

## 2020-06-02 DIAGNOSIS — D12.4 BENIGN NEOPLASM OF DESCENDING COLON: ICD-10-CM

## 2020-06-02 DIAGNOSIS — D12.2 BENIGN NEOPLASM OF ASCENDING COLON: ICD-10-CM

## 2020-06-02 DIAGNOSIS — Z86.010 PERSONAL HISTORY OF COLONIC POLYPS: ICD-10-CM

## 2020-06-02 PROBLEM — K63.5 POLYP OF COLON: Status: ACTIVE | Noted: 2020-06-02

## 2020-06-02 LAB
GI HISTOLOGY: A. UNSPECIFIED: (no result)
GI HISTOLOGY: B. UNSPECIFIED: (no result)
GI HISTOLOGY: PDF REPORT: (no result)

## 2020-06-02 PROCEDURE — 88305 TISSUE EXAM BY PATHOLOGIST: CPT | Performed by: INTERNAL MEDICINE

## 2020-06-02 PROCEDURE — 45385 COLONOSCOPY W/LESION REMOVAL: CPT | Mod: PT | Performed by: INTERNAL MEDICINE

## 2020-07-01 NOTE — PROGRESS NOTES
Subjective     REASONS FOR FOLLOWUP:   1. Chest wall recurrence of breast cancer, ER/AR positive, HER-2 negative, grade 2 with no evidence of distant spread. Arimidex started in 12/2014.   2. T1cN0, grade 1, ER/AR positive breast cancer treated with CMF and 5 years of tamoxifen in 1995.   3. Moderate fatigue from Arimidex.   4. Response clinically and by CT scan on 03/30/2015.   5. Faslodex added to Arimidex in July 2015 to optimize response for surgery.   6. Decision made to forego surgery and do radiation, continue Arimidex in 11/2015.        History of Present Illness  patient is a 65-year-old lady with a locally recurrent breast cancer 24 years from diagnosis currently on Arimidex since December 2014-  5.5  years ago  She is exercising and dieting and her depression and attitude are much better.        Due to the coronavirus pandemic she has been sitting at home and eating and gained back a lot of her weight but she is optimistic that she can lose it once things get back to normal      she is tolerating Arimidex without any problems.   Her tumor markers remain normal and she has no lab abnormalities currently.      CAT scans of the chest abdomen pelvis  mammogram are due in December    She has no cough or symptoms from her radiation scarring in the left lung apex.      Active Ambulatory Problems     Diagnosis Date Noted   • Malignant neoplasm of lower-outer quadrant of left female breast (CMS/HCC) 04/08/2016   • Carcinoma of left breast metastatic to skin (CMS/HCC) 04/08/2016   • Depression 04/18/2016     Resolved Ambulatory Problems     Diagnosis Date Noted   • No Resolved Ambulatory Problems     Past Medical History:   Diagnosis Date   • Arthritis    • Diabetes type 2, controlled (CMS/HCC)    • Hypercholesterolemia    • Malignant neoplasm of breast (female) (CMS/HCC)    • Peripheral autonomic neuropathy due to diabetes mellitus (CMS/HCC)        PAST SURGICAL HISTORY: None except left breast mastectomy 1996.      OB/GYN HISTORY: Menarche was at age 12, menopause in her late 40s induced by CMF chemotherapy. She is  3, para 2 with one miscarriage. First childbirth was at age 25. She did not breast-feed. Never took hormonal replacement.     ONCOLOGIC HISTORY: History of previous dates can be found in a separate document.   The patient was seen on 2015 after getting a 2nd opinion of Dr. Louis Brooks and seeing her surgeon in [place] again. Her circulating tumor cells were present and we felt this argued against doing surgery and we opted to do radiation to the chest wall. Continue Arimidex for the time being and for progression switch to Faslodex and Ibrance. CT scans of the chest, abdomen, pelvis, and bone scan dated 10/07/2015 showed no evidence of systemic disease and just chest wall disease, which has all respon ded to her hormonal therapy and fatty liver.  Patient seen on 2016 after radiation with a PET scan that shows some radiation lung damage which is PET positive with a low SUV and no other signs of metastatic disease.  Arimidex continued    Her stool sample showed fecal blood and she had a colonoscopy with 3 adenomatous polyps removed - EGD was done and showed H. pylori infection and she was treated for this.  She is at the [employer] exercising with Silver sneakers and losing weight and overall has no complaints      SOCIAL HISTORY: She is . 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 She is a nondrinker.     FAMILY HISTORY: Father  at 59 o f an MI. Mother at 68 of lung cancer and was a smoker. She has paternal grandmother with breast cancer at age 47, a paternal aunt with breast cancer at age 59 and pancreatic cancer at 88. She has a paternal first cousin with breast cancer at 44, another p a ternal cousin with breast cancer at 44, another cousin with breast cancer at 61, and another paternal first cousin with breast cancer at 66. BRCA testing was  done on the 44-year-old cousin and was negative. No history of ovarian cancer. She has a paterna l first cousin with colon cancer.     Review of Systems   Constitutional: Negative for chills, fatigue (no issue now 10/31/2018), fever and unexpected weight change (stable 1/8/2020).   HENT: Negative.    Respiratory: Negative.  Negative for chest tightness and shortness of breath.    Cardiovascular: Negative.  Negative for chest pain.   Gastrointestinal: Negative.  Negative for constipation, diarrhea, nausea and vomiting.   Genitourinary: Negative.    Musculoskeletal: Negative.  Negative for arthralgias and back pain.   Neurological: Negative.  Negative for dizziness, numbness (no longer am issue 10/31/2018) and headaches.   Psychiatric/Behavioral: Negative for sleep disturbance. The patient is not nervous/anxious.    All other systems reviewed and are negative.     A comprehensive 14 point review of systems was performed and was negative except as mentioned.    Current Outpatient Medications on File Prior to Visit   Medication Sig Dispense Refill   • anastrozole (ARIMIDEX) 1 MG tablet TAKE 1 TABLET BY MOUTH DAILY. 90 tablet 2   • Calcium Carbonate-Vitamin D (CALCIUM-D PO) Take  by mouth Daily.     • Cholecalciferol (VITAMIN D) 1000 UNITS tablet Take 2,000 Units by mouth.     • lisinopril (PRINIVIL,ZESTRIL) 40 MG tablet TAKE 1 TABLET BY MOUTH EVERY DAY FOR BLOOD PRESSURE  1   • rosuvastatin (CRESTOR) 20 MG tablet Take 20 mg by mouth daily.       No current facility-administered medications on file prior to visit.          ALLERGIES:  No Known Allergies    Objective      There were no vitals filed for this visit.  Current Status 1/8/2020   ECOG score 0       Physical Exam   Pulmonary/Chest:           GENERAL:  Well-developed, well-nourished in no acute distress.   SKIN:  Warm, dry without rashes, purpura or petechiae.  HEAD:  Normocephalic.  EYES:  Pupils equal, round and reactive to light.  EOMs intact.  Conjunctivae  normal.  EARS:  Hearing intact.  NOSE:  Septum midline.  No excoriations or nasal discharge.  MOUTH:  Tongue is well-papillated; no stomatitis or ulcers.  Lips normal.  THROAT:  Oropharynx without lesions or exudates.  NECK:  Supple with good range of motion; no thyromegaly or masses, no JVD.  LYMPHATICS:  No cervical, supraclavicular, axillary or inguinal adenopathy.  CHEST:  Lungs clear to percussion and auscultation. Good airflow.  BREASTS: Right breast with an inverted nipple and no masses-left chest wall with significant radiation skin changes and no residual masses appreciated-redness that she is describing is telangiectasia in the skin from radiation damage-there is some waxy infiltration of the skin which is white most medially on the chest wall  CARDIAC:  Regular rate and rhythm without murmurs, rubs or gallops. Normal S1,S2.  ABDOMEN:  Soft, nontender with no organomegaly or masses.  EXTREMITIES:  No clubbing, cyanosis or edema.  NEUROLOGICAL:  Cranial Nerves II-XII grossly intact.  No focal neurological deficits.  PSYCHIATRIC:  Normal affect and mood.      Physical unchanged on 10/31/18  RECENT LABS:  Hematology WBC   Date Value Ref Range Status   01/08/2020 6.21 3.40 - 10.80 10*3/mm3 Final     RBC   Date Value Ref Range Status   01/08/2020 4.26 3.77 - 5.28 10*6/mm3 Final     Hemoglobin   Date Value Ref Range Status   01/08/2020 13.2 12.0 - 15.9 g/dL Final     Hematocrit   Date Value Ref Range Status   01/08/2020 39.9 34.0 - 46.6 % Final     Platelets   Date Value Ref Range Status   01/08/2020 161 140 - 450 10*3/mm3 Final        CA-15-3= pending    CT CHEST, ABDOMEN AND PELVIS WITH IV CONTRAST      IMPRESSION:  1.  No significant change when compared to prior exam May 2017 or  convincing evidence for metastatic disease.  2.  Unchanged appearance of the left anterior chest wall where there are  nodular areas of apparent scarring that appear stable.  There has been  left mastectomy and left axillary reggie  dissection.  3.  Chronic anteromedial left upper lobe atelectasis and scarring  associated with postradiation change.  4.  Atherosclerotic disease with mild enlargement of the infrarenal  abdominal aorta measuring 2.8 cm.  5.  Sigmoid diverticulosis without evidence for diverticulitis.      Radiation dose reduction techniques were utilized, including automated  exposure control and exposure modulation based on body size.     This report was finalized on 1/29/2019       WHOLE BODY BONE SCAN   IMPRESSION:  Negative bone scan.     This report was finalized on 1/30/2019     BONE MINERAL DENSITOMETRY  IMPRESSION:  Bone density is normal though the bone density of the left  femoral neck is at the lower limits of normal.     This report was finalized on 5/14/2019     WHOLE BODY BONE SCAN   IMPRESSION:  Negative bone scan.     This report was finalized on 1/3/2020 6:15 PM by Dr. Dino Magaña M.D.    CT CHEST, ABDOMEN AND PELVIS WITH IV CONTRAST   IMPRESSION:  1. No change compared to prior exam 01/29/2019. No change in appearance  of the left anterior chest wall where there are areas of apparent  scarring. Previous left mastectomy and left axillary reggie dissection.  2. Chronic medial left upper lobe scarring attributed to post radiation  change.  3. 2, sub-4 mm left upper lobe nodules without change.  4. Sigmoid diverticulosis without evidence for diverticulitis.  5. Atherosclerotic disease with mild enlargement of the infrarenal  abdominal aorta measuring 2.8 cm.     Radiation dose reduction techniques were utilized, including automated  exposure control and exposure modulation based on body size.     This report was finalized on 1/6/2020 2:39 PM by Dr. Syed Tinajero M.D.            Assessment/Plan   1.  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  Anxiety and depression improved    3.  Significant weight gain with hormonal blockade currently improving with diet and exercise 30 pound weight loss in 2019    4   Numbness in the left upper arm and intrascapular regions?  Related to radiation-better    Plan  Return in 6 months for routine evaluation with mammograms CAT scans and bone scan and tumor markers    7/1/2020      CC:

## 2020-07-08 ENCOUNTER — OFFICE VISIT (OUTPATIENT)
Dept: ONCOLOGY | Facility: CLINIC | Age: 70
End: 2020-07-08

## 2020-07-08 ENCOUNTER — LAB (OUTPATIENT)
Dept: LAB | Facility: HOSPITAL | Age: 70
End: 2020-07-08

## 2020-07-08 VITALS
BODY MASS INDEX: 33.88 KG/M2 | TEMPERATURE: 98 F | HEART RATE: 69 BPM | HEIGHT: 62 IN | WEIGHT: 184.1 LBS | OXYGEN SATURATION: 98 % | SYSTOLIC BLOOD PRESSURE: 160 MMHG | RESPIRATION RATE: 16 BRPM | DIASTOLIC BLOOD PRESSURE: 84 MMHG

## 2020-07-08 DIAGNOSIS — C50.512 MALIGNANT NEOPLASM OF LOWER-OUTER QUADRANT OF LEFT BREAST OF FEMALE, ESTROGEN RECEPTOR POSITIVE (HCC): ICD-10-CM

## 2020-07-08 DIAGNOSIS — Z17.0 MALIGNANT NEOPLASM OF LOWER-OUTER QUADRANT OF LEFT BREAST OF FEMALE, ESTROGEN RECEPTOR POSITIVE (HCC): ICD-10-CM

## 2020-07-08 DIAGNOSIS — C50.912 CARCINOMA OF LEFT BREAST METASTATIC TO SKIN (HCC): Primary | ICD-10-CM

## 2020-07-08 DIAGNOSIS — C79.2 CARCINOMA OF LEFT BREAST METASTATIC TO SKIN (HCC): Primary | ICD-10-CM

## 2020-07-08 LAB
ALBUMIN SERPL-MCNC: 4.3 G/DL (ref 3.5–5.2)
ALBUMIN/GLOB SERPL: 1.3 G/DL (ref 1.1–2.4)
ALP SERPL-CCNC: 94 U/L (ref 38–116)
ALT SERPL W P-5'-P-CCNC: 18 U/L (ref 0–33)
ANION GAP SERPL CALCULATED.3IONS-SCNC: 10.1 MMOL/L (ref 5–15)
AST SERPL-CCNC: 19 U/L (ref 0–32)
BASOPHILS # BLD AUTO: 0.05 10*3/MM3 (ref 0–0.2)
BASOPHILS NFR BLD AUTO: 1 % (ref 0–1.5)
BILIRUB SERPL-MCNC: 0.3 MG/DL (ref 0.2–1.2)
BUN SERPL-MCNC: 17 MG/DL (ref 6–20)
BUN/CREAT SERPL: 24.6 (ref 7.3–30)
CALCIUM SPEC-SCNC: 9.8 MG/DL (ref 8.5–10.2)
CANCER AG15-3 SERPL-ACNC: 19.6 U/ML
CHLORIDE SERPL-SCNC: 104 MMOL/L (ref 98–107)
CO2 SERPL-SCNC: 26.9 MMOL/L (ref 22–29)
CREAT SERPL-MCNC: 0.69 MG/DL (ref 0.6–1.1)
DEPRECATED RDW RBC AUTO: 44.1 FL (ref 37–54)
EOSINOPHIL # BLD AUTO: 0.11 10*3/MM3 (ref 0–0.4)
EOSINOPHIL NFR BLD AUTO: 2.3 % (ref 0.3–6.2)
ERYTHROCYTE [DISTWIDTH] IN BLOOD BY AUTOMATED COUNT: 13.1 % (ref 12.3–15.4)
GFR SERPL CREATININE-BSD FRML MDRD: 84 ML/MIN/1.73
GLOBULIN UR ELPH-MCNC: 3.3 GM/DL (ref 1.8–3.5)
GLUCOSE SERPL-MCNC: 140 MG/DL (ref 74–124)
HCT VFR BLD AUTO: 39.2 % (ref 34–46.6)
HGB BLD-MCNC: 12.9 G/DL (ref 12–15.9)
IMM GRANULOCYTES # BLD AUTO: 0.02 10*3/MM3 (ref 0–0.05)
IMM GRANULOCYTES NFR BLD AUTO: 0.4 % (ref 0–0.5)
LYMPHOCYTES # BLD AUTO: 1.49 10*3/MM3 (ref 0.7–3.1)
LYMPHOCYTES NFR BLD AUTO: 30.5 % (ref 19.6–45.3)
MCH RBC QN AUTO: 30.5 PG (ref 26.6–33)
MCHC RBC AUTO-ENTMCNC: 32.9 G/DL (ref 31.5–35.7)
MCV RBC AUTO: 92.7 FL (ref 79–97)
MONOCYTES # BLD AUTO: 0.41 10*3/MM3 (ref 0.1–0.9)
MONOCYTES NFR BLD AUTO: 8.4 % (ref 5–12)
NEUTROPHILS NFR BLD AUTO: 2.8 10*3/MM3 (ref 1.7–7)
NEUTROPHILS NFR BLD AUTO: 57.4 % (ref 42.7–76)
NRBC BLD AUTO-RTO: 0 /100 WBC (ref 0–0.2)
PLATELET # BLD AUTO: 169 10*3/MM3 (ref 140–450)
PMV BLD AUTO: 10 FL (ref 6–12)
POTASSIUM SERPL-SCNC: 4.3 MMOL/L (ref 3.5–4.7)
PROT SERPL-MCNC: 7.6 G/DL (ref 6.3–8)
RBC # BLD AUTO: 4.23 10*6/MM3 (ref 3.77–5.28)
SODIUM SERPL-SCNC: 141 MMOL/L (ref 134–145)
WBC # BLD AUTO: 4.88 10*3/MM3 (ref 3.4–10.8)

## 2020-07-08 PROCEDURE — 85025 COMPLETE CBC W/AUTO DIFF WBC: CPT

## 2020-07-08 PROCEDURE — 86300 IMMUNOASSAY TUMOR CA 15-3: CPT | Performed by: INTERNAL MEDICINE

## 2020-07-08 PROCEDURE — 80053 COMPREHEN METABOLIC PANEL: CPT

## 2020-07-08 PROCEDURE — 99213 OFFICE O/P EST LOW 20 MIN: CPT | Performed by: INTERNAL MEDICINE

## 2020-07-08 PROCEDURE — 36415 COLL VENOUS BLD VENIPUNCTURE: CPT

## 2020-07-27 RX ORDER — ANASTROZOLE 1 MG/1
TABLET ORAL
Qty: 90 TABLET | Refills: 2 | Status: SHIPPED | OUTPATIENT
Start: 2020-07-27 | End: 2021-07-27

## 2020-11-18 ENCOUNTER — APPOINTMENT (OUTPATIENT)
Dept: WOMENS IMAGING | Facility: HOSPITAL | Age: 70
End: 2020-11-18

## 2020-11-18 PROCEDURE — 77067 SCR MAMMO BI INCL CAD: CPT | Performed by: RADIOLOGY

## 2020-11-18 PROCEDURE — 77063 BREAST TOMOSYNTHESIS BI: CPT | Performed by: RADIOLOGY

## 2020-12-29 ENCOUNTER — HOSPITAL ENCOUNTER (OUTPATIENT)
Dept: CT IMAGING | Facility: HOSPITAL | Age: 70
Discharge: HOME | End: 2020-12-29
Admitting: INTERNAL MEDICINE

## 2020-12-29 ENCOUNTER — HOSPITAL ENCOUNTER (OUTPATIENT)
Dept: NUCLEAR MEDICINE | Facility: HOSPITAL | Age: 70
Discharge: HOME | End: 2020-12-29

## 2020-12-29 ENCOUNTER — APPOINTMENT (OUTPATIENT)
Dept: CT IMAGING | Facility: HOSPITAL | Age: 70
End: 2020-12-29

## 2020-12-29 DIAGNOSIS — C50.912 CARCINOMA OF LEFT BREAST METASTATIC TO SKIN (HCC): ICD-10-CM

## 2020-12-29 DIAGNOSIS — C79.2 CARCINOMA OF LEFT BREAST METASTATIC TO SKIN (HCC): ICD-10-CM

## 2020-12-29 DIAGNOSIS — Z17.0 MALIGNANT NEOPLASM OF LOWER-OUTER QUADRANT OF LEFT BREAST OF FEMALE, ESTROGEN RECEPTOR POSITIVE (HCC): ICD-10-CM

## 2020-12-29 DIAGNOSIS — C50.512 MALIGNANT NEOPLASM OF LOWER-OUTER QUADRANT OF LEFT BREAST OF FEMALE, ESTROGEN RECEPTOR POSITIVE (HCC): ICD-10-CM

## 2020-12-29 LAB — CREAT BLDA-MCNC: 0.7 MG/DL (ref 0.6–1.3)

## 2020-12-29 PROCEDURE — 71260 CT THORAX DX C+: CPT

## 2020-12-29 PROCEDURE — 74177 CT ABD & PELVIS W/CONTRAST: CPT

## 2020-12-29 PROCEDURE — 25010000002 IOPAMIDOL 61 % SOLUTION: Performed by: INTERNAL MEDICINE

## 2020-12-29 PROCEDURE — 0 TECHNETIUM MEDRONATE KIT: Performed by: INTERNAL MEDICINE

## 2020-12-29 PROCEDURE — A9503 TC99M MEDRONATE: HCPCS | Performed by: INTERNAL MEDICINE

## 2020-12-29 PROCEDURE — 82565 ASSAY OF CREATININE: CPT

## 2020-12-29 PROCEDURE — 0 DIATRIZOATE MEGLUMINE & SODIUM PER 1 ML: Performed by: INTERNAL MEDICINE

## 2020-12-29 PROCEDURE — 78306 BONE IMAGING WHOLE BODY: CPT

## 2020-12-29 RX ORDER — TC 99M MEDRONATE 20 MG/10ML
22.8 INJECTION, POWDER, LYOPHILIZED, FOR SOLUTION INTRAVENOUS
Status: COMPLETED | OUTPATIENT
Start: 2020-12-29 | End: 2020-12-29

## 2020-12-29 RX ADMIN — Medication 22.8 MILLICURIE: at 08:59

## 2020-12-29 RX ADMIN — DIATRIZOATE MEGLUMINE AND DIATRIZOATE SODIUM 30 ML: 600; 100 SOLUTION ORAL; RECTAL at 08:33

## 2020-12-29 RX ADMIN — IOPAMIDOL 85 ML: 612 INJECTION, SOLUTION INTRAVENOUS at 09:33

## 2021-01-06 ENCOUNTER — LAB (OUTPATIENT)
Dept: LAB | Facility: HOSPITAL | Age: 71
End: 2021-01-06

## 2021-01-06 DIAGNOSIS — C50.512 MALIGNANT NEOPLASM OF LOWER-OUTER QUADRANT OF LEFT BREAST OF FEMALE, ESTROGEN RECEPTOR POSITIVE (HCC): ICD-10-CM

## 2021-01-06 DIAGNOSIS — C50.912 CARCINOMA OF LEFT BREAST METASTATIC TO SKIN (HCC): ICD-10-CM

## 2021-01-06 DIAGNOSIS — C79.2 CARCINOMA OF LEFT BREAST METASTATIC TO SKIN (HCC): ICD-10-CM

## 2021-01-06 DIAGNOSIS — Z17.0 MALIGNANT NEOPLASM OF LOWER-OUTER QUADRANT OF LEFT BREAST OF FEMALE, ESTROGEN RECEPTOR POSITIVE (HCC): ICD-10-CM

## 2021-01-06 LAB
ALBUMIN SERPL-MCNC: 4.3 G/DL (ref 3.5–5.2)
ALBUMIN/GLOB SERPL: 1.2 G/DL (ref 1.1–2.4)
ALP SERPL-CCNC: 114 U/L (ref 38–116)
ALT SERPL W P-5'-P-CCNC: 18 U/L (ref 0–33)
ANION GAP SERPL CALCULATED.3IONS-SCNC: 10.4 MMOL/L (ref 5–15)
AST SERPL-CCNC: 20 U/L (ref 0–32)
BASOPHILS # BLD AUTO: 0.06 10*3/MM3 (ref 0–0.2)
BASOPHILS NFR BLD AUTO: 1.1 % (ref 0–1.5)
BILIRUB SERPL-MCNC: 0.4 MG/DL (ref 0.2–1.2)
BUN SERPL-MCNC: 15 MG/DL (ref 6–20)
BUN/CREAT SERPL: 21.1 (ref 7.3–30)
CALCIUM SPEC-SCNC: 10 MG/DL (ref 8.5–10.2)
CANCER AG15-3 SERPL-ACNC: 20.1 U/ML
CHLORIDE SERPL-SCNC: 101 MMOL/L (ref 98–107)
CO2 SERPL-SCNC: 27.6 MMOL/L (ref 22–29)
CREAT SERPL-MCNC: 0.71 MG/DL (ref 0.6–1.1)
DEPRECATED RDW RBC AUTO: 44.1 FL (ref 37–54)
EOSINOPHIL # BLD AUTO: 0.1 10*3/MM3 (ref 0–0.4)
EOSINOPHIL NFR BLD AUTO: 1.8 % (ref 0.3–6.2)
ERYTHROCYTE [DISTWIDTH] IN BLOOD BY AUTOMATED COUNT: 13 % (ref 12.3–15.4)
GFR SERPL CREATININE-BSD FRML MDRD: 81 ML/MIN/1.73
GLOBULIN UR ELPH-MCNC: 3.5 GM/DL (ref 1.8–3.5)
GLUCOSE SERPL-MCNC: 141 MG/DL (ref 74–124)
HCT VFR BLD AUTO: 39.9 % (ref 34–46.6)
HGB BLD-MCNC: 13.2 G/DL (ref 12–15.9)
IMM GRANULOCYTES # BLD AUTO: 0.02 10*3/MM3 (ref 0–0.05)
IMM GRANULOCYTES NFR BLD AUTO: 0.4 % (ref 0–0.5)
LYMPHOCYTES # BLD AUTO: 1.64 10*3/MM3 (ref 0.7–3.1)
LYMPHOCYTES NFR BLD AUTO: 30 % (ref 19.6–45.3)
MCH RBC QN AUTO: 30.6 PG (ref 26.6–33)
MCHC RBC AUTO-ENTMCNC: 33.1 G/DL (ref 31.5–35.7)
MCV RBC AUTO: 92.4 FL (ref 79–97)
MONOCYTES # BLD AUTO: 0.45 10*3/MM3 (ref 0.1–0.9)
MONOCYTES NFR BLD AUTO: 8.2 % (ref 5–12)
NEUTROPHILS NFR BLD AUTO: 3.2 10*3/MM3 (ref 1.7–7)
NEUTROPHILS NFR BLD AUTO: 58.5 % (ref 42.7–76)
NRBC BLD AUTO-RTO: 0 /100 WBC (ref 0–0.2)
PLATELET # BLD AUTO: 163 10*3/MM3 (ref 140–450)
PMV BLD AUTO: 9.1 FL (ref 6–12)
POTASSIUM SERPL-SCNC: 4.4 MMOL/L (ref 3.5–4.7)
PROT SERPL-MCNC: 7.8 G/DL (ref 6.3–8)
RBC # BLD AUTO: 4.32 10*6/MM3 (ref 3.77–5.28)
SODIUM SERPL-SCNC: 139 MMOL/L (ref 134–145)
WBC # BLD AUTO: 5.47 10*3/MM3 (ref 3.4–10.8)

## 2021-01-06 PROCEDURE — 86300 IMMUNOASSAY TUMOR CA 15-3: CPT | Performed by: INTERNAL MEDICINE

## 2021-01-06 PROCEDURE — 81241 F5 GENE: CPT | Performed by: INTERNAL MEDICINE

## 2021-01-06 PROCEDURE — 36415 COLL VENOUS BLD VENIPUNCTURE: CPT

## 2021-01-06 PROCEDURE — 85025 COMPLETE CBC W/AUTO DIFF WBC: CPT

## 2021-01-06 PROCEDURE — 80053 COMPREHEN METABOLIC PANEL: CPT

## 2021-01-15 ENCOUNTER — IMMUNIZATION (OUTPATIENT)
Dept: VACCINE CLINIC | Facility: HOSPITAL | Age: 71
End: 2021-01-15

## 2021-01-15 PROCEDURE — 0001A: CPT | Performed by: INTERNAL MEDICINE

## 2021-01-15 PROCEDURE — 0002A: CPT | Performed by: INTERNAL MEDICINE

## 2021-01-15 PROCEDURE — 91300 HC SARSCOV02 VAC 30MCG/0.3ML IM: CPT | Performed by: INTERNAL MEDICINE

## 2021-02-05 ENCOUNTER — IMMUNIZATION (OUTPATIENT)
Dept: VACCINE CLINIC | Facility: HOSPITAL | Age: 71
End: 2021-02-05

## 2021-02-05 PROCEDURE — 91300 HC SARSCOV02 VAC 30MCG/0.3ML IM: CPT | Performed by: INTERNAL MEDICINE

## 2021-02-05 PROCEDURE — 0002A: CPT | Performed by: INTERNAL MEDICINE

## 2021-07-13 ENCOUNTER — LAB (OUTPATIENT)
Dept: LAB | Facility: HOSPITAL | Age: 71
End: 2021-07-13

## 2021-07-13 ENCOUNTER — OFFICE VISIT (OUTPATIENT)
Dept: ONCOLOGY | Facility: CLINIC | Age: 71
End: 2021-07-13

## 2021-07-13 VITALS
OXYGEN SATURATION: 96 % | TEMPERATURE: 97.3 F | RESPIRATION RATE: 16 BRPM | SYSTOLIC BLOOD PRESSURE: 153 MMHG | BODY MASS INDEX: 34.27 KG/M2 | WEIGHT: 186.2 LBS | DIASTOLIC BLOOD PRESSURE: 83 MMHG | HEIGHT: 62 IN | HEART RATE: 72 BPM

## 2021-07-13 DIAGNOSIS — C50.512 MALIGNANT NEOPLASM OF LOWER-OUTER QUADRANT OF LEFT BREAST OF FEMALE, ESTROGEN RECEPTOR POSITIVE (HCC): ICD-10-CM

## 2021-07-13 DIAGNOSIS — C79.2 CARCINOMA OF LEFT BREAST METASTATIC TO SKIN (HCC): ICD-10-CM

## 2021-07-13 DIAGNOSIS — Z17.0 MALIGNANT NEOPLASM OF LOWER-OUTER QUADRANT OF LEFT BREAST OF FEMALE, ESTROGEN RECEPTOR POSITIVE (HCC): ICD-10-CM

## 2021-07-13 DIAGNOSIS — Z17.0 MALIGNANT NEOPLASM OF LOWER-OUTER QUADRANT OF LEFT BREAST OF FEMALE, ESTROGEN RECEPTOR POSITIVE (HCC): Primary | ICD-10-CM

## 2021-07-13 DIAGNOSIS — C50.912 CARCINOMA OF LEFT BREAST METASTATIC TO SKIN (HCC): ICD-10-CM

## 2021-07-13 DIAGNOSIS — C50.512 MALIGNANT NEOPLASM OF LOWER-OUTER QUADRANT OF LEFT BREAST OF FEMALE, ESTROGEN RECEPTOR POSITIVE (HCC): Primary | ICD-10-CM

## 2021-07-13 LAB
ALBUMIN SERPL-MCNC: 4.3 G/DL (ref 3.5–5.2)
ALBUMIN/GLOB SERPL: 1.3 G/DL (ref 1.1–2.4)
ALP SERPL-CCNC: 116 U/L (ref 38–116)
ALT SERPL W P-5'-P-CCNC: 19 U/L (ref 0–33)
ANION GAP SERPL CALCULATED.3IONS-SCNC: 14.2 MMOL/L (ref 5–15)
AST SERPL-CCNC: 22 U/L (ref 0–32)
BASOPHILS # BLD AUTO: 0.04 10*3/MM3 (ref 0–0.2)
BASOPHILS NFR BLD AUTO: 0.7 % (ref 0–1.5)
BILIRUB SERPL-MCNC: 0.2 MG/DL (ref 0.2–1.2)
BUN SERPL-MCNC: 14 MG/DL (ref 6–20)
BUN/CREAT SERPL: 20.9 (ref 7.3–30)
CALCIUM SPEC-SCNC: 9.8 MG/DL (ref 8.5–10.2)
CANCER AG15-3 SERPL-ACNC: 22.5 U/ML
CHLORIDE SERPL-SCNC: 100 MMOL/L (ref 98–107)
CO2 SERPL-SCNC: 24.8 MMOL/L (ref 22–29)
CREAT SERPL-MCNC: 0.67 MG/DL (ref 0.6–1.1)
DEPRECATED RDW RBC AUTO: 44.9 FL (ref 37–54)
EOSINOPHIL # BLD AUTO: 0.12 10*3/MM3 (ref 0–0.4)
EOSINOPHIL NFR BLD AUTO: 2 % (ref 0.3–6.2)
ERYTHROCYTE [DISTWIDTH] IN BLOOD BY AUTOMATED COUNT: 13.2 % (ref 12.3–15.4)
GFR SERPL CREATININE-BSD FRML MDRD: 87 ML/MIN/1.73
GLOBULIN UR ELPH-MCNC: 3.3 GM/DL (ref 1.8–3.5)
GLUCOSE SERPL-MCNC: 205 MG/DL (ref 74–124)
HCT VFR BLD AUTO: 39.1 % (ref 34–46.6)
HGB BLD-MCNC: 12.9 G/DL (ref 12–15.9)
IMM GRANULOCYTES # BLD AUTO: 0.04 10*3/MM3 (ref 0–0.05)
IMM GRANULOCYTES NFR BLD AUTO: 0.7 % (ref 0–0.5)
LYMPHOCYTES # BLD AUTO: 1.63 10*3/MM3 (ref 0.7–3.1)
LYMPHOCYTES NFR BLD AUTO: 27 % (ref 19.6–45.3)
MCH RBC QN AUTO: 30.9 PG (ref 26.6–33)
MCHC RBC AUTO-ENTMCNC: 33 G/DL (ref 31.5–35.7)
MCV RBC AUTO: 93.5 FL (ref 79–97)
MONOCYTES # BLD AUTO: 0.5 10*3/MM3 (ref 0.1–0.9)
MONOCYTES NFR BLD AUTO: 8.3 % (ref 5–12)
NEUTROPHILS NFR BLD AUTO: 3.7 10*3/MM3 (ref 1.7–7)
NEUTROPHILS NFR BLD AUTO: 61.3 % (ref 42.7–76)
NRBC BLD AUTO-RTO: 0 /100 WBC (ref 0–0.2)
PLATELET # BLD AUTO: 156 10*3/MM3 (ref 140–450)
PMV BLD AUTO: 9.6 FL (ref 6–12)
POTASSIUM SERPL-SCNC: 4.2 MMOL/L (ref 3.5–4.7)
PROT SERPL-MCNC: 7.6 G/DL (ref 6.3–8)
RBC # BLD AUTO: 4.18 10*6/MM3 (ref 3.77–5.28)
SODIUM SERPL-SCNC: 139 MMOL/L (ref 134–145)
WBC # BLD AUTO: 6.03 10*3/MM3 (ref 3.4–10.8)

## 2021-07-13 PROCEDURE — 80053 COMPREHEN METABOLIC PANEL: CPT

## 2021-07-13 PROCEDURE — 99214 OFFICE O/P EST MOD 30 MIN: CPT | Performed by: INTERNAL MEDICINE

## 2021-07-13 PROCEDURE — 85025 COMPLETE CBC W/AUTO DIFF WBC: CPT

## 2021-07-13 PROCEDURE — 36415 COLL VENOUS BLD VENIPUNCTURE: CPT

## 2021-07-13 PROCEDURE — 86300 IMMUNOASSAY TUMOR CA 15-3: CPT | Performed by: INTERNAL MEDICINE

## 2021-07-13 NOTE — PROGRESS NOTES
Subjective     REASONS FOR FOLLOWUP:   1. Chest wall recurrence of breast cancer, ER/CA positive, HER-2 negative, grade 2 with no evidence of distant spread. Arimidex started in 12/2014.   2. T1cN0, grade 1, ER/CA positive breast cancer treated with CMF and 5 years of tamoxifen in 1995.   3. Moderate fatigue from Arimidex.   4. Response clinically and by CT scan on 03/30/2015.   5. Faslodex added to Arimidex in July 2015 to optimize response for surgery.   6. Decision made to forego surgery and do radiation, continue Arimidex in 11/2015.        History of Present Illness  patient is a 65-year-old lady with a locally recurrent breast cancer 24 years from diagnosis currently on Arimidex since December 2014-  6.5   years ago  She is exercising and dieting and her depression and attitude are much better.  Unfortunately she has gained quite a bit of weight during the coronavirus pandemic because of isolation and depression but she is optimistic this year that she will do better     She has been vaccinated against COVID-19 but her sons of refused to get vaccinated she is very upset about this    The dermatologist scraped off an area on her left chest wall that looked a little different and thankfully this was also benign    she is tolerating Arimidex without any problems.   Her tumor markers remain normal and she has no lab abnormalities currently.    She has no cough or symptoms from her radiation scarring in the left lung apex.      Active Ambulatory Problems     Diagnosis Date Noted   • Malignant neoplasm of lower-outer quadrant of left female breast (CMS/HCC) 04/08/2016   • Carcinoma of left breast metastatic to skin (CMS/HCC) 04/08/2016   • Depression 04/18/2016     Resolved Ambulatory Problems     Diagnosis Date Noted   • No Resolved Ambulatory Problems     Past Medical History:   Diagnosis Date   • Arthritis    • Diabetes type 2, controlled (CMS/HCC)    • Hypercholesterolemia    • Malignant neoplasm of breast  (female) (CMS/HCC)    • Peripheral autonomic neuropathy due to diabetes mellitus (CMS/HCC)        PAST SURGICAL HISTORY: None except left breast mastectomy .     OB/GYN HISTORY: Menarche was at age 12, menopause in her late 40s induced by CMF chemotherapy. She is  3, para 2 with one miscarriage. First childbirth was at age 25. She did not breast-feed. Never took hormonal replacement.     ONCOLOGIC HISTORY: History of previous dates can be found in a separate document.   The patient was seen on 2015 after getting a 2nd opinion of Dr. Louis Brooks and seeing her surgeon in [place] again. Her circulating tumor cells were present and we felt this argued against doing surgery and we opted to do radiation to the chest wall. Continue Arimidex for the time being and for progression switch to Faslodex and Ibrance. CT scans of the chest, abdomen, pelvis, and bone scan dated 10/07/2015 showed no evidence of systemic disease and just chest wall disease, which has all respon ded to her hormonal therapy and fatty liver.  Patient seen on 2016 after radiation with a PET scan that shows some radiation lung damage which is PET positive with a low SUV and no other signs of metastatic disease.  Arimidex continued    Her stool sample showed fecal blood and she had a colonoscopy with 3 adenomatous polyps removed - EGD was done and showed H. pylori infection and she was treated for this.  She is at the [facility] exercising with Silver sneakers and losing weight and overall has no complaints      SOCIAL HISTORY: She is . 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 She is a nondrinker.     FAMILY HISTORY: Father  at 59 o f an MI. Mother at 68 of lung cancer and was a smoker. She has paternal grandmother with breast cancer at age 47, a paternal aunt with breast cancer at age 59 and pancreatic cancer at 88. She has a paternal first cousin with breast cancer at 44, another  p a ternal cousin with breast cancer at 44, another cousin with breast cancer at 61, and another paternal first cousin with breast cancer at 66. BRCA testing was done on the 44-year-old cousin and was negative. No history of ovarian cancer. She has a paterna l first cousin with colon cancer.     Review of Systems   Constitutional: Negative for chills, fatigue and fever.   HENT: Negative.    Respiratory: Negative.  Negative for chest tightness and shortness of breath.    Cardiovascular: Negative.  Negative for chest pain.   Gastrointestinal: Negative.  Negative for constipation, diarrhea, nausea and vomiting.   Genitourinary: Negative.    Musculoskeletal: Negative.  Negative for arthralgias and back pain.   Neurological: Negative for dizziness and headaches.   Psychiatric/Behavioral: Negative for sleep disturbance. The patient is not nervous/anxious.    All other systems reviewed and are negative.     A comprehensive 14 point review of systems was performed and was negative except as mentioned.  I have reviewed and confirmed the accuracy of the ROS as documented by the MA/LPN/RN Devin Mattson MD      Current Outpatient Medications on File Prior to Visit   Medication Sig Dispense Refill   • anastrozole (ARIMIDEX) 1 MG tablet TAKE 1 TABLET BY MOUTH DAILY. 90 tablet 2   • Calcium Carbonate-Vitamin D (CALCIUM-D PO) Take  by mouth Daily.     • Cholecalciferol (VITAMIN D) 1000 UNITS tablet Take 2,000 Units by mouth.     • ELDERBERRY PO Take  by mouth.     • Fluzone High-Dose Quadrivalent 0.7 ML suspension prefilled syringe inject 0.7ml intramuscularly by certified h     • lisinopril (PRINIVIL,ZESTRIL) 40 MG tablet TAKE 1 TABLET BY MOUTH EVERY DAY FOR BLOOD PRESSURE  1   • rosuvastatin (CRESTOR) 20 MG tablet Take 20 mg by mouth daily.     • vitamin C (ASCORBIC ACID) 250 MG tablet Take 250 mg by mouth Daily.     • Zinc 50 MG capsule        No current facility-administered medications on file prior to visit.  "        ALLERGIES:  No Known Allergies    Objective      Vitals:    07/13/21 1103   BP: 153/83   Pulse: 72   Resp: 16   Temp: 97.3 °F (36.3 °C)   TempSrc: Skin   SpO2: 96%   Weight: 84.5 kg (186 lb 3.2 oz)   Height: 158 cm (62.21\")   PainSc: 0-No pain     Current Status 7/13/2021   ECOG score 0       Physical Exam   Pulmonary/Chest:           GENERAL:  Well-developed, well-nourished in no acute distress.   SKIN:  Warm, dry without rashes, purpura or petechiae.  HEAD:  Normocephalic.  NECK:  Supple with good range of motion; no thyromegaly or masses, no JVD.  LYMPHATICS:  No cervical, supraclavicular, axillary or inguinal adenopathy.  CHEST:  Lungs clear to percussion and auscultation. Good airflow.  BREASTS: Right breast with an inverted nipple and no masses-left chest wall with significant radiation skin changes and no residual masses appreciated-redness that she is describing is telangiectasia in the skin from radiation damage-  CARDIAC:  Regular rate and rhythm without murmurs, rubs or gallops. Normal S1,S2.  ABDOMEN:  Soft, nontender with no organomegaly or masses.  EXTREMITIES:  No clubbing, cyanosis or edema.  NEUROLOGICAL:  Cranial Nerves II-XII grossly intact.  No focal neurological deficits.  PSYCHIATRIC:  Normal affect and mood.    I have reexamined the patient and the results are consistent with the previously documented exam. Devin Mattson MD       RECENT LABS:  Hematology WBC   Date Value Ref Range Status   07/13/2021 6.03 3.40 - 10.80 10*3/mm3 Final     RBC   Date Value Ref Range Status   07/13/2021 4.18 3.77 - 5.28 10*6/mm3 Final     Hemoglobin   Date Value Ref Range Status   07/13/2021 12.9 12.0 - 15.9 g/dL Final     Hematocrit   Date Value Ref Range Status   07/13/2021 39.1 34.0 - 46.6 % Final     Platelets   Date Value Ref Range Status   07/13/2021 156 140 - 450 10*3/mm3 Final        CA-15-3= pending    CT CHEST, ABDOMEN AND PELVIS WITH IV CONTRAST      IMPRESSION:  1.  No significant change " when compared to prior exam May 2017 or  convincing evidence for metastatic disease.  2.  Unchanged appearance of the left anterior chest wall where there are  nodular areas of apparent scarring that appear stable.  There has been  left mastectomy and left axillary reggie dissection.  3.  Chronic anteromedial left upper lobe atelectasis and scarring  associated with postradiation change.  4.  Atherosclerotic disease with mild enlargement of the infrarenal  abdominal aorta measuring 2.8 cm.  5.  Sigmoid diverticulosis without evidence for diverticulitis.      Radiation dose reduction techniques were utilized, including automated  exposure control and exposure modulation based on body size.     This report was finalized on 1/29/2019       WHOLE BODY BONE SCAN   IMPRESSION:  Negative bone scan.     This report was finalized on 1/30/2019     BONE MINERAL DENSITOMETRY  IMPRESSION:  Bone density is normal though the bone density of the left  femoral neck is at the lower limits of normal.     This report was finalized on 5/14/2019     WHOLE BODY BONE SCAN   IMPRESSION:  Negative bone scan.     This report was finalized on 1/3/2020 6:15 PM by Dr. Dino Magaña M.D.    CT CHEST, ABDOMEN AND PELVIS WITH IV CONTRAST   IMPRESSION:  1. No change compared to prior exam 01/29/2019. No change in appearance  of the left anterior chest wall where there are areas of apparent  scarring. Previous left mastectomy and left axillary reggie dissection.  2. Chronic medial left upper lobe scarring attributed to post radiation  change.  3. 2, sub-4 mm left upper lobe nodules without change.  4. Sigmoid diverticulosis without evidence for diverticulitis.  5. Atherosclerotic disease with mild enlargement of the infrarenal  abdominal aorta measuring 2.8 cm.     Radiation dose reduction techniques were utilized, including automated  exposure control and exposure modulation based on body size.     This report was finalized on 1/6/2020 2:39 PM by  Dr. Syed Tinajero M.D.      08/04//2020 11/18/2020 12/29/2020  NUCLEAR MEDICINE WHOLE BODY BONE SCAN     HISTORY: Breast cancer. Evaluate for metastatic disease.     TECHNIQUE:  22.8 mCi of 99m technetium MDP was administered IV followed  by 3 hour delayed phase whole body imaging with selected spot views.     COMPARISON: CT chest, abdomen and pelvis 12/29/2020, whole body bone  scan 01/03/2020, 01/29/2019.   IMPRESSION;  No interval change or scintigraphic evidence for bony metastatic  disease.     12/29//2020  CT CHEST, ABDOMEN, AND PELVIS WITH IV CONTRAST     HISTORY: 70-year-old female with left breast cancer metastatic to the  skin. Left mastectomy and axillary lymph node dissection in the past.  Evaluate for metastatic disease.   IMPRESSION:  1. Stable examination. There is no change in the appearance of the left  pectoralis musculature along the medial and lateral aspects as  described. There is no lymphadenopathy and there is no convincing  evidence for metastatic disease within the chest.  2. There is no evidence for metastatic disease within the abdomen or  pelvis.            Assessment/Plan   1.  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  Anxiety and depression improved    3.  Significant weight gain with hormonal blockade currently improving with diet and exercise 30 pound weight loss in 2019    4   Numbness in the left upper arm and intrascapular regions?  Related to radiation-better    Plan  Return in 6 months for routine evaluation scans will be done every December tumor markers pending  7/13/2021      CC:

## 2021-07-27 RX ORDER — ANASTROZOLE 1 MG/1
TABLET ORAL
Qty: 90 TABLET | Refills: 2 | Status: SHIPPED | OUTPATIENT
Start: 2021-07-27 | End: 2022-07-13 | Stop reason: SDUPTHER

## 2021-08-17 ENCOUNTER — TELEPHONE (OUTPATIENT)
Dept: ONCOLOGY | Facility: CLINIC | Age: 71
End: 2021-08-17

## 2021-08-17 NOTE — TELEPHONE ENCOUNTER
Caller: Charles Lopez    Relationship: Self    Best call back number: [phone]    What is the best time to reach you: ANYTIME    Who are you requesting to speak with (clinical staff, provider,  specific staff member): CLINICAL    Do you know the name of the person who called:     What was the call regarding: SHOULD PT CONSIDER COVID BOOSTER? DOES SHE QUALIFY FOR EARLY SCHEDULING    Do you require a callback: YES

## 2021-11-19 ENCOUNTER — APPOINTMENT (OUTPATIENT)
Dept: WOMENS IMAGING | Facility: HOSPITAL | Age: 71
End: 2021-11-19

## 2021-11-19 PROCEDURE — 77063 BREAST TOMOSYNTHESIS BI: CPT | Performed by: RADIOLOGY

## 2021-11-19 PROCEDURE — 77067 SCR MAMMO BI INCL CAD: CPT | Performed by: RADIOLOGY

## 2021-12-28 ENCOUNTER — HOSPITAL ENCOUNTER (OUTPATIENT)
Dept: NUCLEAR MEDICINE | Facility: HOSPITAL | Age: 71
Discharge: HOME | End: 2021-12-28

## 2021-12-28 ENCOUNTER — HOSPITAL ENCOUNTER (OUTPATIENT)
Dept: CT IMAGING | Facility: HOSPITAL | Age: 71
Discharge: HOME | End: 2021-12-28
Admitting: INTERNAL MEDICINE

## 2021-12-28 DIAGNOSIS — C79.2 CARCINOMA OF LEFT BREAST METASTATIC TO SKIN (HCC): ICD-10-CM

## 2021-12-28 DIAGNOSIS — Z17.0 MALIGNANT NEOPLASM OF LOWER-OUTER QUADRANT OF LEFT BREAST OF FEMALE, ESTROGEN RECEPTOR POSITIVE (HCC): ICD-10-CM

## 2021-12-28 DIAGNOSIS — C79.2 CARCINOMA OF LEFT BREAST METASTATIC TO SKIN: ICD-10-CM

## 2021-12-28 DIAGNOSIS — C50.512 MALIGNANT NEOPLASM OF LOWER-OUTER QUADRANT OF LEFT BREAST OF FEMALE, ESTROGEN RECEPTOR POSITIVE: ICD-10-CM

## 2021-12-28 DIAGNOSIS — C50.912 CARCINOMA OF LEFT BREAST METASTATIC TO SKIN (HCC): ICD-10-CM

## 2021-12-28 DIAGNOSIS — C50.912 CARCINOMA OF LEFT BREAST METASTATIC TO SKIN: ICD-10-CM

## 2021-12-28 DIAGNOSIS — C50.512 MALIGNANT NEOPLASM OF LOWER-OUTER QUADRANT OF LEFT BREAST OF FEMALE, ESTROGEN RECEPTOR POSITIVE (HCC): ICD-10-CM

## 2021-12-28 DIAGNOSIS — Z17.0 MALIGNANT NEOPLASM OF LOWER-OUTER QUADRANT OF LEFT BREAST OF FEMALE, ESTROGEN RECEPTOR POSITIVE: ICD-10-CM

## 2021-12-28 LAB — CREAT BLDA-MCNC: 0.7 MG/DL (ref 0.6–1.3)

## 2021-12-28 PROCEDURE — 25010000002 IOPAMIDOL 61 % SOLUTION: Performed by: INTERNAL MEDICINE

## 2021-12-28 PROCEDURE — 74177 CT ABD & PELVIS W/CONTRAST: CPT

## 2021-12-28 PROCEDURE — A9503 TC99M MEDRONATE: HCPCS | Performed by: INTERNAL MEDICINE

## 2021-12-28 PROCEDURE — 71260 CT THORAX DX C+: CPT

## 2021-12-28 PROCEDURE — 78306 BONE IMAGING WHOLE BODY: CPT

## 2021-12-28 PROCEDURE — 0 DIATRIZOATE MEGLUMINE & SODIUM PER 1 ML: Performed by: INTERNAL MEDICINE

## 2021-12-28 PROCEDURE — 0 TECHNETIUM MEDRONATE KIT: Performed by: INTERNAL MEDICINE

## 2021-12-28 PROCEDURE — 82565 ASSAY OF CREATININE: CPT

## 2021-12-28 RX ORDER — TC 99M MEDRONATE 20 MG/10ML
20.2 INJECTION, POWDER, LYOPHILIZED, FOR SOLUTION INTRAVENOUS
Status: COMPLETED | OUTPATIENT
Start: 2021-12-28 | End: 2021-12-28

## 2021-12-28 RX ADMIN — IOPAMIDOL 85 ML: 612 INJECTION, SOLUTION INTRAVENOUS at 10:12

## 2021-12-28 RX ADMIN — Medication 20.2 MILLICURIE: at 09:06

## 2021-12-28 RX ADMIN — DIATRIZOATE MEGLUMINE AND DIATRIZOATE SODIUM 30 ML: 600; 100 SOLUTION ORAL; RECTAL at 09:12

## 2022-01-04 ENCOUNTER — LAB (OUTPATIENT)
Dept: LAB | Facility: HOSPITAL | Age: 72
End: 2022-01-04

## 2022-01-04 ENCOUNTER — OFFICE VISIT (OUTPATIENT)
Dept: ONCOLOGY | Facility: CLINIC | Age: 72
End: 2022-01-04

## 2022-01-04 VITALS
HEIGHT: 62 IN | RESPIRATION RATE: 17 BRPM | HEART RATE: 78 BPM | WEIGHT: 185.7 LBS | BODY MASS INDEX: 34.17 KG/M2 | DIASTOLIC BLOOD PRESSURE: 85 MMHG | SYSTOLIC BLOOD PRESSURE: 172 MMHG | TEMPERATURE: 97.4 F | OXYGEN SATURATION: 97 %

## 2022-01-04 DIAGNOSIS — C50.512 MALIGNANT NEOPLASM OF LOWER-OUTER QUADRANT OF LEFT BREAST OF FEMALE, ESTROGEN RECEPTOR POSITIVE: Primary | ICD-10-CM

## 2022-01-04 DIAGNOSIS — Z17.0 MALIGNANT NEOPLASM OF LOWER-OUTER QUADRANT OF LEFT BREAST OF FEMALE, ESTROGEN RECEPTOR POSITIVE: ICD-10-CM

## 2022-01-04 DIAGNOSIS — C79.2 CARCINOMA OF LEFT BREAST METASTATIC TO SKIN: ICD-10-CM

## 2022-01-04 DIAGNOSIS — C50.912 CARCINOMA OF LEFT BREAST METASTATIC TO SKIN: ICD-10-CM

## 2022-01-04 DIAGNOSIS — Z17.0 MALIGNANT NEOPLASM OF LOWER-OUTER QUADRANT OF LEFT BREAST OF FEMALE, ESTROGEN RECEPTOR POSITIVE: Primary | ICD-10-CM

## 2022-01-04 DIAGNOSIS — C50.512 MALIGNANT NEOPLASM OF LOWER-OUTER QUADRANT OF LEFT BREAST OF FEMALE, ESTROGEN RECEPTOR POSITIVE: ICD-10-CM

## 2022-01-04 LAB
ALBUMIN SERPL-MCNC: 4.3 G/DL (ref 3.5–5.2)
ALBUMIN/GLOB SERPL: 1.3 G/DL (ref 1.1–2.4)
ALP SERPL-CCNC: 118 U/L (ref 38–116)
ALT SERPL W P-5'-P-CCNC: 22 U/L (ref 0–33)
ANION GAP SERPL CALCULATED.3IONS-SCNC: 12.7 MMOL/L (ref 5–15)
AST SERPL-CCNC: 21 U/L (ref 0–32)
BASOPHILS # BLD AUTO: 0.06 10*3/MM3 (ref 0–0.2)
BASOPHILS NFR BLD AUTO: 0.9 % (ref 0–1.5)
BILIRUB SERPL-MCNC: 0.3 MG/DL (ref 0.2–1.2)
BUN SERPL-MCNC: 16 MG/DL (ref 6–20)
BUN/CREAT SERPL: 25 (ref 7.3–30)
CALCIUM SPEC-SCNC: 9.8 MG/DL (ref 8.5–10.2)
CANCER AG15-3 SERPL-ACNC: 26.5 U/ML
CHLORIDE SERPL-SCNC: 100 MMOL/L (ref 98–107)
CO2 SERPL-SCNC: 26.3 MMOL/L (ref 22–29)
CREAT SERPL-MCNC: 0.64 MG/DL (ref 0.6–1.1)
DEPRECATED RDW RBC AUTO: 42.4 FL (ref 37–54)
EOSINOPHIL # BLD AUTO: 0.09 10*3/MM3 (ref 0–0.4)
EOSINOPHIL NFR BLD AUTO: 1.4 % (ref 0.3–6.2)
ERYTHROCYTE [DISTWIDTH] IN BLOOD BY AUTOMATED COUNT: 12.9 % (ref 12.3–15.4)
GFR SERPL CREATININE-BSD FRML MDRD: 91 ML/MIN/1.73
GLOBULIN UR ELPH-MCNC: 3.4 GM/DL (ref 1.8–3.5)
GLUCOSE SERPL-MCNC: 165 MG/DL (ref 74–124)
HCT VFR BLD AUTO: 40.1 % (ref 34–46.6)
HGB BLD-MCNC: 13.3 G/DL (ref 12–15.9)
IMM GRANULOCYTES # BLD AUTO: 0.05 10*3/MM3 (ref 0–0.05)
IMM GRANULOCYTES NFR BLD AUTO: 0.8 % (ref 0–0.5)
LYMPHOCYTES # BLD AUTO: 1.82 10*3/MM3 (ref 0.7–3.1)
LYMPHOCYTES NFR BLD AUTO: 27.4 % (ref 19.6–45.3)
MCH RBC QN AUTO: 30.2 PG (ref 26.6–33)
MCHC RBC AUTO-ENTMCNC: 33.2 G/DL (ref 31.5–35.7)
MCV RBC AUTO: 90.9 FL (ref 79–97)
MONOCYTES # BLD AUTO: 0.56 10*3/MM3 (ref 0.1–0.9)
MONOCYTES NFR BLD AUTO: 8.4 % (ref 5–12)
NEUTROPHILS NFR BLD AUTO: 4.06 10*3/MM3 (ref 1.7–7)
NEUTROPHILS NFR BLD AUTO: 61.1 % (ref 42.7–76)
NRBC BLD AUTO-RTO: 0 /100 WBC (ref 0–0.2)
PLATELET # BLD AUTO: 185 10*3/MM3 (ref 140–450)
PMV BLD AUTO: 9 FL (ref 6–12)
POTASSIUM SERPL-SCNC: 4.1 MMOL/L (ref 3.5–4.7)
PROT SERPL-MCNC: 7.7 G/DL (ref 6.3–8)
RBC # BLD AUTO: 4.41 10*6/MM3 (ref 3.77–5.28)
SODIUM SERPL-SCNC: 139 MMOL/L (ref 134–145)
WBC NRBC COR # BLD: 6.64 10*3/MM3 (ref 3.4–10.8)

## 2022-01-04 PROCEDURE — 99214 OFFICE O/P EST MOD 30 MIN: CPT | Performed by: INTERNAL MEDICINE

## 2022-01-04 PROCEDURE — 36415 COLL VENOUS BLD VENIPUNCTURE: CPT

## 2022-01-04 PROCEDURE — 86300 IMMUNOASSAY TUMOR CA 15-3: CPT | Performed by: INTERNAL MEDICINE

## 2022-01-04 PROCEDURE — 80053 COMPREHEN METABOLIC PANEL: CPT

## 2022-01-04 PROCEDURE — 85025 COMPLETE CBC W/AUTO DIFF WBC: CPT

## 2022-01-04 RX ORDER — ANASTROZOLE 1 MG/1
TABLET ORAL
COMMUNITY
End: 2022-07-06 | Stop reason: SDUPTHER

## 2022-01-04 RX ORDER — METFORMIN HYDROCHLORIDE 500 MG/1
500 TABLET, EXTENDED RELEASE ORAL 2 TIMES DAILY
COMMUNITY
Start: 2021-12-04

## 2022-01-04 NOTE — PROGRESS NOTES
Subjective     REASONS FOR FOLLOWUP:   1. Chest wall recurrence of breast cancer, ER/DE positive, HER-2 negative, grade 2 with no evidence of distant spread. Arimidex started in 12/2014.   2. T1cN0, grade 1, ER/DE positive breast cancer treated with CMF and 5 years of tamoxifen in 1995.   3. Moderate fatigue from Arimidex.   4. Response clinically and by CT scan on 03/30/2015.   5. Faslodex added to Arimidex in July 2015 to optimize response for surgery.   6. Decision made to forego surgery and do radiation, continue Arimidex in 11/2015.        History of Present Illness  patient is a 65-year-old lady with a locally recurrent breast cancer 24 years from diagnosis currently on Arimidex since December 2014-  6.5   years ago  She is exercising and dieting and her depression and attitude are much better.  Unfortunately she has gained quite a bit of weight during the coronavirus pandemic because of isolation and depression but she is optimistic this year that she will do better     CAT scans and bone scans show no evidence of recurrent cancer    She has been vaccinated against COVID-19 and had a booster but 1 of her sons of refused to get vaccinated she is very upset about this    The dermatologist scraped off an area on her left chest wall that looked a little different and thankfully this was also benign    she is tolerating Arimidex without any problems.   Her tumor markers remain normal and she has no lab abnormalities currently.    She has no cough or symptoms from her radiation scarring in the left lung apex.      Active Ambulatory Problems     Diagnosis Date Noted   • Malignant neoplasm of lower-outer quadrant of left female breast (HCC) 04/08/2016   • Carcinoma of left breast metastatic to skin (HCC) 04/08/2016   • Depression 04/18/2016     Resolved Ambulatory Problems     Diagnosis Date Noted   • No Resolved Ambulatory Problems     Past Medical History:   Diagnosis Date   • Arthritis    • Diabetes type 2,  controlled (HCC)    • Hypercholesterolemia    • Malignant neoplasm of breast (female) (HCC)    • Peripheral autonomic neuropathy due to diabetes mellitus (HCC)        PAST SURGICAL HISTORY: None except left breast mastectomy .     OB/GYN HISTORY: Menarche was at age 12, menopause in her late 40s induced by CMF chemotherapy. She is  3, para 2 with one miscarriage. First childbirth was at age 25. She did not breast-feed. Never took hormonal replacement.     ONCOLOGIC HISTORY: History of previous dates can be found in a separate document.   The patient was seen on 2015 after getting a 2nd opinion of Dr. Louis Brooks and seeing her surgeon in [place] again. Her circulating tumor cells were present and we felt this argued against doing surgery and we opted to do radiation to the chest wall. Continue Arimidex for the time being and for progression switch to Faslodex and Ibrance. CT scans of the chest, abdomen, pelvis, and bone scan dated 10/07/2015 showed no evidence of systemic disease and just chest wall disease, which has all respon ded to her hormonal therapy and fatty liver.  Patient seen on 2016 after radiation with a PET scan that shows some radiation lung damage which is PET positive with a low SUV and no other signs of metastatic disease.  Arimidex continued    Her stool sample showed fecal blood and she had a colonoscopy with 3 adenomatous polyps removed - EGD was done and showed H. pylori infection and she was treated for this.  She is at the [employer] exercising with Silver sneakers and losing weight and overall has no complaints      SOCIAL HISTORY: She is . 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 She is a nondrinker.     FAMILY HISTORY: Father  at 59 o f an MI. Mother at 68 of lung cancer and was a smoker. She has paternal grandmother with breast cancer at age 47, a paternal aunt with breast cancer at age 59 and pancreatic cancer at  88. She has a paternal first cousin with breast cancer at 44, another p a ternal cousin with breast cancer at 44, another cousin with breast cancer at 61, and another paternal first cousin with breast cancer at 66. BRCA testing was done on the 44-year-old cousin and was negative. No history of ovarian cancer. She has a paterna l first cousin with colon cancer.     Review of Systems   Constitutional: Negative for chills, fatigue and fever.   HENT: Negative.    Respiratory: Negative.  Negative for chest tightness and shortness of breath.    Cardiovascular: Negative.  Negative for chest pain.   Gastrointestinal: Negative.  Negative for constipation, diarrhea, nausea and vomiting.   Genitourinary: Negative.    Musculoskeletal: Negative.  Negative for arthralgias and back pain.   Neurological: Negative for dizziness and headaches.   Psychiatric/Behavioral: Negative for sleep disturbance. The patient is not nervous/anxious.    All other systems reviewed and are negative.     A comprehensive 14 point review of systems was performed and was negative except as mentioned.  I have reviewed and confirmed the accuracy of the ROS as documented by the MA/LPN/RN Devin Mattson MD      Current Outpatient Medications on File Prior to Visit   Medication Sig Dispense Refill   • anastrozole (ARIMIDEX) 1 MG tablet TAKE 1 TABLET BY MOUTH DAILY. 90 tablet 2   • anastrozole (Arimidex) 1 MG tablet Arimidex 1 mg tablet   Take 1 tablet every day by oral route.     • Calcium Carbonate-Vitamin D (CALCIUM-D PO) Take  by mouth Daily.     • Cholecalciferol (VITAMIN D) 1000 UNITS tablet Take 2,000 Units by mouth.     • ELDERBERRY PO Take  by mouth.     • lisinopril (PRINIVIL,ZESTRIL) 40 MG tablet TAKE 1 TABLET BY MOUTH EVERY DAY FOR BLOOD PRESSURE  1   • metFORMIN ER (GLUCOPHAGE-XR) 500 MG 24 hr tablet Take 500 mg by mouth 2 (Two) Times a Day.     • rosuvastatin (CRESTOR) 20 MG tablet Take 20 mg by mouth daily.     • vitamin C (ASCORBIC ACID)  "250 MG tablet Take 250 mg by mouth Daily.     • Zinc 50 MG capsule      • [DISCONTINUED] Fluzone High-Dose Quadrivalent 0.7 ML suspension prefilled syringe inject 0.7ml intramuscularly by certified [facility]       No current facility-administered medications on file prior to visit.         ALLERGIES:  No Known Allergies    Objective      Vitals:    01/04/22 1059   BP: 172/85   Pulse: 78   Resp: 17   Temp: 97.4 °F (36.3 °C)   TempSrc: Temporal   SpO2: 97%   Weight: 84.2 kg (185 lb 11.2 oz)   Height: 158 cm (62.21\")   PainSc: 0-No pain     Current Status 1/4/2022   ECOG score 0       Physical Exam   Pulmonary/Chest:           GENERAL:  Well-developed, well-nourished in no acute distress.   SKIN:  Warm, dry without rashes, purpura or petechiae.  HEAD:  Normocephalic.  NECK:  Supple with good range of motion; no thyromegaly or masses, no JVD.  LYMPHATICS:  No cervical, supraclavicular, axillary or inguinal adenopathy.  CHEST:  Lungs clear to percussion and auscultation. Good airflow.  BREASTS: Right breast with an inverted nipple and no masses-left chest wall with significant radiation skin changes and no residual masses appreciated-redness that she is describing is telangiectasia in the skin from radiation damage-  CARDIAC:  Regular rate and rhythm without murmurs, rubs or gallops. Normal S1,S2.  ABDOMEN:  Soft, nontender with no organomegaly or masses.  EXTREMITIES:  No clubbing, cyanosis or edema.  NEUROLOGICAL:  Cranial Nerves II-XII grossly intact.  No focal neurological deficits.  PSYCHIATRIC:  Normal affect and mood.    I have reexamined the patient and the results are consistent with the previously documented exam. Devin Mattson MD       RECENT LABS:  Hematology WBC   Date Value Ref Range Status   01/04/2022 6.64 3.40 - 10.80 10*3/mm3 Final     RBC   Date Value Ref Range Status   01/04/2022 4.41 3.77 - 5.28 10*6/mm3 Final     Hemoglobin   Date Value Ref Range Status   01/04/2022 13.3 12.0 - 15.9 g/dL Final "     Hematocrit   Date Value Ref Range Status   01/04/2022 40.1 34.0 - 46.6 % Final     Platelets   Date Value Ref Range Status   01/04/2022 185 140 - 450 10*3/mm3 Final        CA-15-3= pendin  Bone scan  FINDINGS: There is normal genitourinary radiotracer activity. Focal  radiotracer uptake typical for degenerative change is again observed in  the wrists, right patella, the midfoot bilaterally, and along the right  side of the upper lumbar spine at the L1-2 disc level. This is not  significantly changed in comparison with numerous prior bone scans and  the spine activity correlates with advanced degenerative disc change  seen on CT. There is no scintigraphic evidence of osseous metastasis.     IMPRESSION:  Negative.     This report was finalized on 12/28/2021      CTCAP    FINDINGS:  CHEST: There is no interval change in the bandlike scarring at the left  upper lobe. There are new very faint ill-defined groundglass densities  at the lower lobes. There are no new suspicious opacities or nodules.  There are no pleural or pericardial effusions. There is no  lymphadenopathy within the chest. There is no change in the appearance  of the left anterior chest wall. There is no new thickening or  nodularity. There is no axillary or subpectoral lymphadenopathy.     ABDOMEN/PELVIS: The liver, gallbladder, pancreas, spleen, adrenals, and  kidneys appear unremarkable. No acute bowel abnormality is seen. Uterus  and adnexa appear unremarkable. There is no free fluid or  lymphadenopathy. There are moderately extensive abdominal aortic  atherosclerotic changes without aneurysmal dilatation. No suspicious  bone lesion is seen.     IMPRESSION:  Stable examination. There is no convincing evidence for  metastatic disease and there is no new abnormality.     This report was finalized on 12/28/2021     Assessment/Plan   1.  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  Anxiety and depression improved    3.  Significant weight gain with hormonal blockade currently improving with diet and exercise 30 pound weight loss in 2019    4   Numbness in the left upper arm and intrascapular regions?  Related to radiation-better   5.  Vaccinated and booster against COVID-19    Plan  1.  Continue Arimidex  2.Return in 6 months for routine evaluation scans will be done every December tumor markers pending  1/4/2022      CC:

## 2022-01-07 ENCOUNTER — TELEPHONE (OUTPATIENT)
Dept: ONCOLOGY | Facility: CLINIC | Age: 72
End: 2022-01-07

## 2022-01-07 NOTE — TELEPHONE ENCOUNTER
Pt said she usually only has scans once a year.  Now she is scheduled for 6 months.  Wants to know if her last scan showed something.

## 2022-06-27 ENCOUNTER — APPOINTMENT (OUTPATIENT)
Dept: NUCLEAR MEDICINE | Facility: HOSPITAL | Age: 72
End: 2022-06-27

## 2022-06-27 ENCOUNTER — APPOINTMENT (OUTPATIENT)
Dept: CT IMAGING | Facility: HOSPITAL | Age: 72
End: 2022-06-27

## 2022-07-06 ENCOUNTER — OFFICE VISIT (OUTPATIENT)
Dept: ONCOLOGY | Facility: CLINIC | Age: 72
End: 2022-07-06

## 2022-07-06 ENCOUNTER — LAB (OUTPATIENT)
Dept: LAB | Facility: HOSPITAL | Age: 72
End: 2022-07-06

## 2022-07-06 VITALS
TEMPERATURE: 97.1 F | HEIGHT: 62 IN | DIASTOLIC BLOOD PRESSURE: 77 MMHG | HEART RATE: 75 BPM | SYSTOLIC BLOOD PRESSURE: 164 MMHG | BODY MASS INDEX: 33.6 KG/M2 | RESPIRATION RATE: 16 BRPM | WEIGHT: 182.6 LBS | OXYGEN SATURATION: 96 %

## 2022-07-06 DIAGNOSIS — C50.912 CARCINOMA OF LEFT BREAST METASTATIC TO SKIN: ICD-10-CM

## 2022-07-06 DIAGNOSIS — Z17.0 MALIGNANT NEOPLASM OF LOWER-OUTER QUADRANT OF LEFT BREAST OF FEMALE, ESTROGEN RECEPTOR POSITIVE: ICD-10-CM

## 2022-07-06 DIAGNOSIS — Z17.0 MALIGNANT NEOPLASM OF LOWER-OUTER QUADRANT OF LEFT BREAST OF FEMALE, ESTROGEN RECEPTOR POSITIVE: Primary | ICD-10-CM

## 2022-07-06 DIAGNOSIS — C50.512 MALIGNANT NEOPLASM OF LOWER-OUTER QUADRANT OF LEFT BREAST OF FEMALE, ESTROGEN RECEPTOR POSITIVE: ICD-10-CM

## 2022-07-06 DIAGNOSIS — C79.2 CARCINOMA OF LEFT BREAST METASTATIC TO SKIN: ICD-10-CM

## 2022-07-06 DIAGNOSIS — C50.512 MALIGNANT NEOPLASM OF LOWER-OUTER QUADRANT OF LEFT BREAST OF FEMALE, ESTROGEN RECEPTOR POSITIVE: Primary | ICD-10-CM

## 2022-07-06 LAB
ALBUMIN SERPL-MCNC: 4.4 G/DL (ref 3.5–5.2)
ALBUMIN/GLOB SERPL: 1.3 G/DL (ref 1.1–2.4)
ALP SERPL-CCNC: 91 U/L (ref 38–116)
ALT SERPL W P-5'-P-CCNC: 22 U/L (ref 0–33)
ANION GAP SERPL CALCULATED.3IONS-SCNC: 13.8 MMOL/L (ref 5–15)
AST SERPL-CCNC: 20 U/L (ref 0–32)
BASOPHILS # BLD AUTO: 0.05 10*3/MM3 (ref 0–0.2)
BASOPHILS NFR BLD AUTO: 0.8 % (ref 0–1.5)
BILIRUB SERPL-MCNC: 0.3 MG/DL (ref 0.2–1.2)
BUN SERPL-MCNC: 18 MG/DL (ref 6–20)
BUN/CREAT SERPL: 25.4 (ref 7.3–30)
CALCIUM SPEC-SCNC: 9.9 MG/DL (ref 8.5–10.2)
CHLORIDE SERPL-SCNC: 98 MMOL/L (ref 98–107)
CO2 SERPL-SCNC: 26.2 MMOL/L (ref 22–29)
CREAT SERPL-MCNC: 0.71 MG/DL (ref 0.6–1.1)
DEPRECATED RDW RBC AUTO: 43.7 FL (ref 37–54)
EGFRCR SERPLBLD CKD-EPI 2021: 89.9 ML/MIN/1.73
EOSINOPHIL # BLD AUTO: 0.12 10*3/MM3 (ref 0–0.4)
EOSINOPHIL NFR BLD AUTO: 1.8 % (ref 0.3–6.2)
ERYTHROCYTE [DISTWIDTH] IN BLOOD BY AUTOMATED COUNT: 13 % (ref 12.3–15.4)
GLOBULIN UR ELPH-MCNC: 3.3 GM/DL (ref 1.8–3.5)
GLUCOSE SERPL-MCNC: 136 MG/DL (ref 74–124)
HCT VFR BLD AUTO: 37.7 % (ref 34–46.6)
HGB BLD-MCNC: 12.6 G/DL (ref 12–15.9)
IMM GRANULOCYTES # BLD AUTO: 0.02 10*3/MM3 (ref 0–0.05)
IMM GRANULOCYTES NFR BLD AUTO: 0.3 % (ref 0–0.5)
LYMPHOCYTES # BLD AUTO: 1.8 10*3/MM3 (ref 0.7–3.1)
LYMPHOCYTES NFR BLD AUTO: 27.4 % (ref 19.6–45.3)
MCH RBC QN AUTO: 30.7 PG (ref 26.6–33)
MCHC RBC AUTO-ENTMCNC: 33.4 G/DL (ref 31.5–35.7)
MCV RBC AUTO: 92 FL (ref 79–97)
MONOCYTES # BLD AUTO: 0.54 10*3/MM3 (ref 0.1–0.9)
MONOCYTES NFR BLD AUTO: 8.2 % (ref 5–12)
NEUTROPHILS NFR BLD AUTO: 4.05 10*3/MM3 (ref 1.7–7)
NEUTROPHILS NFR BLD AUTO: 61.5 % (ref 42.7–76)
NRBC BLD AUTO-RTO: 0 /100 WBC (ref 0–0.2)
PLATELET # BLD AUTO: 165 10*3/MM3 (ref 140–450)
PMV BLD AUTO: 8.7 FL (ref 6–12)
POTASSIUM SERPL-SCNC: 4.3 MMOL/L (ref 3.5–4.7)
PROT SERPL-MCNC: 7.7 G/DL (ref 6.3–8)
RBC # BLD AUTO: 4.1 10*6/MM3 (ref 3.77–5.28)
SODIUM SERPL-SCNC: 138 MMOL/L (ref 134–145)
WBC NRBC COR # BLD: 6.58 10*3/MM3 (ref 3.4–10.8)

## 2022-07-06 PROCEDURE — 99214 OFFICE O/P EST MOD 30 MIN: CPT | Performed by: INTERNAL MEDICINE

## 2022-07-06 PROCEDURE — 36415 COLL VENOUS BLD VENIPUNCTURE: CPT

## 2022-07-06 PROCEDURE — 80053 COMPREHEN METABOLIC PANEL: CPT

## 2022-07-06 PROCEDURE — 85025 COMPLETE CBC W/AUTO DIFF WBC: CPT

## 2022-07-06 NOTE — PROGRESS NOTES
Subjective     REASONS FOR FOLLOWUP:   1. Chest wall recurrence of breast cancer, ER/[place] positive, HER-2 negative, grade 2 with no evidence of distant spread. Arimidex started in 12/2014.   2. T1cN0, grade 1, ER/[place] positive breast cancer treated with CMF and 5 years of tamoxifen in 1995.   3. Moderate fatigue from Arimidex.   4. Response clinically and by CT scan on 03/30/2015.   5. Faslodex added to Arimidex in July 2015 to optimize response for surgery.   6. Decision made to forego surgery and do radiation, continue Arimidex in 11/2015.        History of Present Illness  patient is a 65-year-old lady with a locally recurrent breast cancer 24 years from diagnosis currently on Arimidex since December 2014-  7.5   years ago  She is exercising and dieting and her depression and attitude are much better.    She has been vaccinated against COVID-19 and had a booster but 1 of her sons of refused to get vaccinated she is very upset about this    The dermatologist scraped off an area on her left chest wall that looked a little different and thankfully this was also benign    she is tolerating Arimidex without any problems.   Her tumor markers remain borderline high and she has no lab abnormalities currently.    She is up-to-date with scopes  She has no cough or symptoms from her radiation scarring in the left lung apex.      Active Ambulatory Problems     Diagnosis Date Noted   • Malignant neoplasm of lower-outer quadrant of left female breast (HCC) 04/08/2016   • Carcinoma of left breast metastatic to skin (HCC) 04/08/2016   • Depression 04/18/2016     Resolved Ambulatory Problems     Diagnosis Date Noted   • No Resolved Ambulatory Problems     Past Medical History:   Diagnosis Date   • Arthritis    • Diabetes type 2, controlled (HCC)    • Hypercholesterolemia    • Malignant neoplasm of breast (female) (HCC)    • Peripheral autonomic neuropathy due to diabetes mellitus (HCC)        PAST SURGICAL HISTORY: None except left  breast mastectomy .     OB/GYN HISTORY: Menarche was at age 12, menopause in her late 40s induced by CMF chemotherapy. She is  3, para 2 with one miscarriage. First childbirth was at age 25. She did not breast-feed. Never took hormonal replacement.     ONCOLOGIC HISTORY: History of previous dates can be found in a separate document.   The patient was seen on 2015 after getting a 2nd opinion of Dr. Louis Brooks and seeing her surgeon in [place] again. Her circulating tumor cells were present and we felt this argued against doing surgery and we opted to do radiation to the chest wall. Continue Arimidex for the time being and for progression switch to Faslodex and Ibrance. CT scans of the chest, abdomen, pelvis, and bone scan dated 10/07/2015 showed no evidence of systemic disease and just chest wall disease, which has all respon ded to her hormonal therapy and fatty liver.  Patient seen on 2016 after radiation with a PET scan that shows some radiation lung damage which is PET positive with a low SUV and no other signs of metastatic disease.  Arimidex continued    Her stool sample showed fecal blood and she had a colonoscopy with 3 adenomatous polyps removed - EGD was done and showed H. pylori infection and she was treated for this.  She is at the [employer] exercising with Silver sneakers and losing weight and overall has no complaints      SOCIAL HISTORY: She is . 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 She is a nondrinker.     FAMILY HISTORY: Father  at 59 o f an MI. Mother at 68 of lung cancer and was a smoker. She has paternal grandmother with breast cancer at age 47, a paternal aunt with breast cancer at age 59 and pancreatic cancer at 88. She has a paternal first cousin with breast cancer at 44, another p a ternal cousin with breast cancer at 44, another cousin with breast cancer at 61, and another paternal first cousin with breast cancer  at 66. BRCA testing was done on the 44-year-old cousin and was negative. No history of ovarian cancer. She has a paterna l first cousin with colon cancer.     Review of Systems   Constitutional: Negative for chills, fatigue and fever.   HENT: Negative.    Respiratory: Negative.  Negative for chest tightness and shortness of breath.    Cardiovascular: Negative.  Negative for chest pain.   Gastrointestinal: Negative.  Negative for constipation, diarrhea, nausea and vomiting.   Genitourinary: Negative.    Musculoskeletal: Negative.  Negative for arthralgias and back pain.   Neurological: Negative for dizziness and headaches.   Psychiatric/Behavioral: Negative for sleep disturbance. The patient is not nervous/anxious.    All other systems reviewed and are negative.     A comprehensive 14 point review of systems was performed and was negative except as mentioned.  I have reviewed and confirmed the accuracy of the ROS as documented by the MA/LPN/RN Devin Mtatson MD      Current Outpatient Medications on File Prior to Visit   Medication Sig Dispense Refill   • anastrozole (ARIMIDEX) 1 MG tablet TAKE 1 TABLET BY MOUTH DAILY. 90 tablet 2   • Calcium Carbonate-Vitamin D (CALCIUM-D PO) Take  by mouth Daily.     • Cholecalciferol (VITAMIN D) 1000 UNITS tablet Take 2,000 Units by mouth.     • lisinopril (PRINIVIL,ZESTRIL) 40 MG tablet TAKE 1 TABLET BY MOUTH EVERY DAY FOR BLOOD PRESSURE  1   • metFORMIN ER (GLUCOPHAGE-XR) 500 MG 24 hr tablet Take 500 mg by mouth 2 (Two) Times a Day.     • rosuvastatin (CRESTOR) 20 MG tablet Take 20 mg by mouth daily.     • vitamin C (ASCORBIC ACID) 250 MG tablet Take 250 mg by mouth Daily.     • Zinc 50 MG capsule      • [DISCONTINUED] anastrozole (Arimidex) 1 MG tablet Arimidex 1 mg tablet   Take 1 tablet every day by oral route.     • [DISCONTINUED] ELDERBERRY PO Take  by mouth.       No current facility-administered medications on file prior to visit.         ALLERGIES:  No Known  "Allergies    Objective      Vitals:    07/06/22 1137   BP: 164/77   Pulse: 75   Resp: 16   Temp: 97.1 °F (36.2 °C)   TempSrc: Temporal   SpO2: 96%   Weight: 82.8 kg (182 lb 9.6 oz)   Height: 158 cm (62.21\")   PainSc: 0-No pain     Current Status 1/4/2022   ECOG score 0       Physical Exam   Pulmonary/Chest:           GENERAL:  Well-developed, well-nourished in no acute distress.   SKIN:  Warm, dry without rashes, purpura or petechiae.  HEAD:  Normocephalic.  NECK:  Supple with good range of motion; no thyromegaly or masses, no JVD.  LYMPHATICS:  No cervical, supraclavicular, axillary or inguinal adenopathy.  CHEST:  Lungs clear to percussion and auscultation. Good airflow.  BREASTS: Right breast with an inverted nipple and no masses-left chest wall with significant radiation skin changes and no residual masses appreciated-redness that she is describing is telangiectasia in the skin from radiation damage-  CARDIAC:  Regular rate and rhythm without murmurs, rubs or gallops. Normal S1,S2.  ABDOMEN:  Soft, nontender with no organomegaly or masses.  EXTREMITIES:  No clubbing, cyanosis or edema.  NEUROLOGICAL:  Cranial Nerves II-XII grossly intact.  No focal neurological deficits.  PSYCHIATRIC:  Normal affect and mood.    I have reexamined the patient and the results are consistent with the previously documented exam. Devin Mattson MD       RECENT LABS:  Hematology WBC   Date Value Ref Range Status   07/06/2022 6.58 3.40 - 10.80 10*3/mm3 Final     RBC   Date Value Ref Range Status   07/06/2022 4.10 3.77 - 5.28 10*6/mm3 Final     Hemoglobin   Date Value Ref Range Status   07/06/2022 12.6 12.0 - 15.9 g/dL Final     Hematocrit   Date Value Ref Range Status   07/06/2022 37.7 34.0 - 46.6 % Final     Platelets   Date Value Ref Range Status   07/06/2022 165 140 - 450 10*3/mm3 Final          Bone scan  FINDINGS: There is normal genitourinary radiotracer activity. Focal  radiotracer uptake typical for degenerative change is " again observed in  the wrists, right patella, the midfoot bilaterally, and along the right  side of the upper lumbar spine at the L1-2 disc level. This is not  significantly changed in comparison with numerous prior bone scans and  the spine activity correlates with advanced degenerative disc change  seen on CT. There is no scintigraphic evidence of osseous metastasis.     IMPRESSION:  Negative.     This report was finalized on 12/28/2021      CTCAP    FINDINGS:  CHEST: There is no interval change in the bandlike scarring at the left  upper lobe. There are new very faint ill-defined groundglass densities  at the lower lobes. There are no new suspicious opacities or nodules.  There are no pleural or pericardial effusions. There is no  lymphadenopathy within the chest. There is no change in the appearance  of the left anterior chest wall. There is no new thickening or  nodularity. There is no axillary or subpectoral lymphadenopathy.     ABDOMEN/PELVIS: The liver, gallbladder, pancreas, spleen, adrenals, and  kidneys appear unremarkable. No acute bowel abnormality is seen. Uterus  and adnexa appear unremarkable. There is no free fluid or  lymphadenopathy. There are moderately extensive abdominal aortic  atherosclerotic changes without aneurysmal dilatation. No suspicious  bone lesion is seen.     IMPRESSION:  Stable examination. There is no convincing evidence for  metastatic disease and there is no new abnormality.     This report was finalized on 12/28/2021     Assessment & Plan   1.  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  Anxiety and depression improved    3.   Significant weight gain with hormonal blockade currently improving with diet and exercise 30 pound weight loss in 2019    4   Numbness in the left upper arm and intrascapular regions?  Related to radiation-better   5.  Vaccinated and booster against COVID-19    Plan  1.  Continue Arimidex  2.Return in 6 months for routine evaluation scans will be done every December tumor markers pending  7/6/2022      CC:

## 2022-07-13 RX ORDER — ANASTROZOLE 1 MG/1
1 TABLET ORAL DAILY
Qty: 90 TABLET | Refills: 2 | Status: SHIPPED | OUTPATIENT
Start: 2022-07-13 | End: 2023-02-22

## 2022-07-13 NOTE — TELEPHONE ENCOUNTER
Caller: MARGARETTE    Relationship: [facility]    Best call back number: [phone]    FAX: [phone]    Requested Prescriptions:   Requested Prescriptions     Pending Prescriptions Disp Refills   • anastrozole (ARIMIDEX) 1 MG tablet 90 tablet 2     Sig: Take 1 tablet by mouth Daily.        Pharmacy where request should be sent:    [address] - [phone]  - [phone] FX  [phone]      MARGARETTE STATES HE FAXED THE RX [phone].    Shaylee Stratton   07/13/22 12:50 EDT

## 2022-07-20 ENCOUNTER — TELEPHONE (OUTPATIENT)
Dept: ONCOLOGY | Facility: CLINIC | Age: 72
End: 2022-07-20

## 2022-07-20 NOTE — TELEPHONE ENCOUNTER
Caller: SHIVA    Relationship: [facility]    Best call back number: [phone]    What was the call regarding: SHIVA CALLED REGARDING THE ORDERS THEY RECEIVED FOR THE PROTHESIS AND MASTECTOMY BRAS. SHE SAYS THEY ARE NEEDING THOSE ORDERS TO BE SIGNED.    FAX#: [phone]

## 2022-07-25 ENCOUNTER — HOSPITAL ENCOUNTER (OUTPATIENT)
Dept: MRI IMAGING | Facility: HOSPITAL | Age: 72
Discharge: HOME | End: 2022-07-25
Admitting: NURSE PRACTITIONER

## 2022-07-25 ENCOUNTER — TRANSCRIBE ORDERS (OUTPATIENT)
Dept: ADMINISTRATIVE | Facility: HOSPITAL | Age: 72
End: 2022-07-25

## 2022-07-25 DIAGNOSIS — M25.462 SWELLING OF LEFT KNEE JOINT: Primary | ICD-10-CM

## 2022-07-25 DIAGNOSIS — M25.462 SWELLING OF LEFT KNEE JOINT: ICD-10-CM

## 2022-07-25 PROCEDURE — 73721 MRI JNT OF LWR EXTRE W/O DYE: CPT

## 2022-07-28 ENCOUNTER — OFFICE VISIT (OUTPATIENT)
Dept: ORTHOPEDIC SURGERY | Facility: CLINIC | Age: 72
End: 2022-07-28

## 2022-07-28 VITALS — HEIGHT: 62 IN | HEART RATE: 92 BPM | BODY MASS INDEX: 34.12 KG/M2 | WEIGHT: 185.4 LBS | OXYGEN SATURATION: 94 %

## 2022-07-28 DIAGNOSIS — S83.242A ACUTE MEDIAL MENISCUS TEAR OF LEFT KNEE, INITIAL ENCOUNTER: ICD-10-CM

## 2022-07-28 DIAGNOSIS — S82.002A CLOSED NONDISPLACED FRACTURE OF LEFT PATELLA, UNSPECIFIED FRACTURE MORPHOLOGY, INITIAL ENCOUNTER: Primary | ICD-10-CM

## 2022-07-28 DIAGNOSIS — S83.522A RUPTURE OF POSTERIOR CRUCIATE LIGAMENT OF LEFT KNEE, INITIAL ENCOUNTER: ICD-10-CM

## 2022-07-28 DIAGNOSIS — M17.12 OSTEOARTHRITIS OF LEFT KNEE, UNSPECIFIED OSTEOARTHRITIS TYPE: ICD-10-CM

## 2022-07-28 DIAGNOSIS — M25.462 EFFUSION OF LEFT KNEE: ICD-10-CM

## 2022-07-28 DIAGNOSIS — M25.562 LEFT KNEE PAIN, UNSPECIFIED CHRONICITY: ICD-10-CM

## 2022-07-28 PROCEDURE — 99203 OFFICE O/P NEW LOW 30 MIN: CPT | Performed by: ORTHOPAEDIC SURGERY

## 2022-07-28 NOTE — PROGRESS NOTES
"Chief Complaint  Initial Evaluation of the Left Knee     Subjective      Charles Lopez presents to [facility] for evaluation of the left knee. The patient reports left knee pain. She was in a Nursing home and got tangled up in a residents cord. She has had an MRI that I reviewed with her today. 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     No Known Allergies     Social History     Socioeconomic History   • Marital status:    • Years of education: High school   Tobacco Use   • Smoking status: Former Smoker     Packs/day: 1.00     Years: 24.00     Pack years: 24.00     Types: Cigarettes     Quit date:      Years since quittin.   • Smokeless tobacco: Former User   Substance and Sexual Activity   • Alcohol use: No   • Drug use: No   • Sexual activity: Defer        Review of Systems     Objective   Vital Signs:   Pulse 92   Ht 157.5 cm (62\")   Wt 84.1 kg (185 lb 6.4 oz)   SpO2 94%   BMI 33.91 kg/m²       Physical Exam  Constitutional:       Appearance: Normal appearance. The patient is well-developed and normal weight.   HENT:      Head: Normocephalic.      Right Ear: Hearing and external ear normal.      Left Ear: Hearing and external ear normal.      Nose: Nose normal.   Eyes:      Conjunctiva/sclera: Conjunctivae normal.   Cardiovascular:      Rate and Rhythm: Normal rate.   Pulmonary:      Effort: Pulmonary effort is normal.      Breath sounds: No wheezing or rales.   Abdominal:      Palpations: Abdomen is soft.      Tenderness: There is no abdominal tenderness.   Musculoskeletal:      Cervical back: Normal range of motion.   Skin:     Findings: No rash.   Neurological:      Mental Status: The patient is alert and oriented to person, place, and time.   Psychiatric:         Mood and Affect: Mood and affect normal.         Judgment: Judgment normal.       Ortho Exam      Left knee- mild swelling. ROM -5 to 90 degrees. Stable to varus/valgus stress. Stable to " anterior/posterior drawer. 1+ effusion. Positive EHL, FHL, GS and TA. Sensation intact to all 5 nerves of the foot. Positive pulses.         Procedures     X-Ray Report:  Left knee(s) X-Ray  Indication: Evaluation of left knee pain  AP and Lateral view(s)  Findings: no acute fracture seen. Moderate tricompartmental degenerative changes with medial joint space narrowing.   Prior studies available for comparison: no     Imaging Results (Most Recent)     Procedure Component Value Units Date/Time    XR Knee 1 or 2 View Left [[id number]] Resulted: 07/28/22 0835     Updated: 07/28/22 0839           Result Review :       MRI Knee Left Without Contrast    Result Date: 7/25/2022  Narrative: PROCEDURE: MRI KNEE LEFT  WO CONTRAST  COMPARISON: None  INDICATIONS: ANTERIOR AND POSTERIOR LEFT KNEE PAIN AND SWELLING AFTER FALL YESTERDAY.      TECHNIQUE: A complete multi-planar MRI was performed.   FINDINGS:  There is a nondisplaced longitudinal fracture at the medial facet of the patella as seen on coronal series 3 and series 4, image 20. There is additional mild subchondral edema at the patella.  There is tricompartmental osteophytosis.  No other definite fracture is seen.  There is a moderate to large knee effusion.  There is a moderate Baker's cyst with fluid along the medial gastrocnemius suggesting rupture/leakage.  The quadriceps and patellar tendons appear to be intact.  Popliteus tendon appears intact.  There appears to be abnormal hyperintense signal in the proximal posterior cruciate ligament with apparent disruption of ligament fibers.  There may be a few remaining intact ligament fibers but abnormal signal appears to involve the majority of the proximal ligament.  Finding is suspicious for a full-thickness or high-grade partial tear.  The anterior cruciate ligament appears to be intact.  No definite medial or lateral collateral ligament injury is identified.  There is a suspected oblique horizontal tear of the medial  meniscus body and posterior horn with extension to the tibial articular surface.  No definite lateral meniscus defect is identified.  There is tricompartmental chondromalacia.  There appears to be full-thickness cartilage loss of the medial and central patella with foci of subchondral edema.  There is suspected partial thickness cartilage loss at the trochlea and far lateral aspect of the patella.  There is also suspected partial thickness cartilage loss of the medial and lateral tibiofemoral compartments.  There is soft tissue edema surrounding the knee.      Impression:   1. Nondisplaced longitudinal fracture of the medial patella. 2. Full-thickness or high-grade partial tear of the proximal posterior cruciate ligament. 3. Tricompartmental osteoarthritis with full-thickness cartilage loss of the patella and mild subchondral edema. 4. Horizontal undersurface tear of the medial meniscus body and posterior horn. 5. Moderate to large knee effusion and moderate Baker's cyst with suspected rupture/leakage.     SABA SONG MD       Electronically Signed and Approved By: SABA SONG MD on 7/25/2022 at 17:07                      Assessment and Plan     Diagnoses and all orders for this visit:    1. Closed nondisplaced fracture of left patella, unspecified fracture morphology, initial encounter (Primary)    2. Left knee pain, unspecified chronicity  -     XR Knee 1 or 2 View Left    3. Acute medial meniscus tear of left knee, initial encounter    4. Rupture of posterior cruciate ligament of left knee, initial encounter    5. Osteoarthritis of left knee, unspecified osteoarthritis type    6. Effusion of left knee        Discussed the treatment plan with the patient.  I reviewed the x-rays that were obtained today with the patient. Plan to continue conservative treatment. She was given a knee brace today. She can bear weight as tolerated. Plan for OTC medications as needed.     Call or return if worsening  symptoms.    Follow Up     4 weeks with repeat x-rays      Patient was given instructions and counseling regarding her condition or for health maintenance advice. Please see specific information pulled into the AVS if appropriate.     Scribed for Dino Sullivan MD by Prudence Zapata.  07/28/22   09:06 EDT    I have personally performed the services described in this document as scribed by the above individual and it is both accurate and complete. Dino Sullivan MD 07/29/22

## 2022-08-11 ENCOUNTER — APPOINTMENT (OUTPATIENT)
Dept: MRI IMAGING | Facility: HOSPITAL | Age: 72
End: 2022-08-11

## 2022-08-25 ENCOUNTER — OFFICE VISIT (OUTPATIENT)
Dept: ORTHOPEDIC SURGERY | Facility: CLINIC | Age: 72
End: 2022-08-25

## 2022-08-25 VITALS — OXYGEN SATURATION: 93 % | HEIGHT: 62 IN | WEIGHT: 186.8 LBS | BODY MASS INDEX: 34.37 KG/M2 | HEART RATE: 71 BPM

## 2022-08-25 DIAGNOSIS — M25.462 EFFUSION OF LEFT KNEE: ICD-10-CM

## 2022-08-25 DIAGNOSIS — S82.002A CLOSED NONDISPLACED FRACTURE OF LEFT PATELLA, UNSPECIFIED FRACTURE MORPHOLOGY, INITIAL ENCOUNTER: ICD-10-CM

## 2022-08-25 DIAGNOSIS — M25.562 LEFT KNEE PAIN, UNSPECIFIED CHRONICITY: Primary | ICD-10-CM

## 2022-08-25 DIAGNOSIS — S83.522A RUPTURE OF POSTERIOR CRUCIATE LIGAMENT OF LEFT KNEE, INITIAL ENCOUNTER: ICD-10-CM

## 2022-08-25 DIAGNOSIS — S83.242A ACUTE MEDIAL MENISCUS TEAR OF LEFT KNEE, INITIAL ENCOUNTER: ICD-10-CM

## 2022-08-25 DIAGNOSIS — M17.12 OSTEOARTHRITIS OF LEFT KNEE, UNSPECIFIED OSTEOARTHRITIS TYPE: ICD-10-CM

## 2022-08-25 PROCEDURE — 99213 OFFICE O/P EST LOW 20 MIN: CPT | Performed by: ORTHOPAEDIC SURGERY

## 2022-08-25 NOTE — PROGRESS NOTES
"Chief Complaint  Follow-up of the Left Knee     Subjective      Charles Lopez presents to [facility] for follow up evaluation of the left knee.  The patient had a left knee injury that she has been treating conservatively. She has no new complaints. She has been wearing a knee brace.     No Known Allergies     Social History     Socioeconomic History   • Marital status:    • Years of education: High school   Tobacco Use   • Smoking status: Former Smoker     Packs/day: 1.00     Years: 24.00     Pack years: 24.00     Types: Cigarettes     Quit date:      Years since quittin.6   • Smokeless tobacco: Former User   Substance and Sexual Activity   • Alcohol use: No   • Drug use: No   • Sexual activity: Defer        Review of Systems     Objective   Vital Signs:   Pulse 71   Ht 157.5 cm (62\")   Wt 84.7 kg (186 lb 12.8 oz)   SpO2 93%   BMI 34.17 kg/m²       Physical Exam  Constitutional:       Appearance: Normal appearance. The patient is well-developed and normal weight.   HENT:      Head: Normocephalic.      Right Ear: Hearing and external ear normal.      Left Ear: Hearing and external ear normal.      Nose: Nose normal.   Eyes:      Conjunctiva/sclera: Conjunctivae normal.   Cardiovascular:      Rate and Rhythm: Normal rate.   Pulmonary:      Effort: Pulmonary effort is normal.      Breath sounds: No wheezing or rales.   Abdominal:      Palpations: Abdomen is soft.      Tenderness: There is no abdominal tenderness.   Musculoskeletal:      Cervical back: Normal range of motion.   Skin:     Findings: No rash.   Neurological:      Mental Status: The patient is alert and oriented to person, place, and time.   Psychiatric:         Mood and Affect: Mood and affect normal.         Judgment: Judgment normal.       Ortho Exam      Left knee- Stable to varus/valgus stress. Stable to anterior/posterior drawer. Tender to the patella. Improved swelling. Neurovascularly intact. " Positive EHL, FHL, GS and TA. Sensation intact to all 5 nerves of the foot. Positive pulses.     Procedures    X-Ray Report:  Left knee(s) X-Ray  Indication: Evaluation of left knee pain  AP and Lateral view(s)  Findings: Moderate tricompartmental degenerative changes with medial joint space narrowing. Healing patella fracture.   Prior studies available for comparison: yes         Imaging Results (Most Recent)     Procedure Component Value Units Date/Time    XR Knee 1 or 2 View Left [[id number]] Resulted: 08/25/22 0804     Updated: 08/25/22 0804           Result Review :       XR Knee 1 or 2 View Left    Result Date: 7/28/2022  Narrative:  X-Ray Report: Left knee(s) X-Ray Indication: Evaluation of left knee pain AP and Lateral view(s) Findings: no acute fracture seen. Moderate tricompartmental degenerative changes with medial joint space narrowing. Prior studies available for comparison: no              Assessment and Plan     Diagnoses and all orders for this visit:    1. Left knee pain, unspecified chronicity (Primary)  -     XR Knee 1 or 2 View Left    2. Closed nondisplaced fracture of left patella, unspecified fracture morphology, initial encounter    3. Acute medial meniscus tear of left knee, initial encounter    4. Rupture of posterior cruciate ligament of left knee, initial encounter    5. Osteoarthritis of left knee, unspecified osteoarthritis type    6. Effusion of left knee        Discussed the treatment plan with the patient.  I reviewed the x-rays that were obtained today with the patient. Plan to wean out of the knee brace.     Call or return if worsening symptoms.    Follow Up     PRN      Patient was given instructions and counseling regarding her condition or for health maintenance advice. Please see specific information pulled into the AVS if appropriate.     Scribed for Dino Sullivan MD by Prudence Zapata.  08/25/22   08:10 EDT    I have personally performed the services described in  this document as scribed by the above individual and it is both accurate and complete. Dino Sullivan MD 08/25/22

## 2022-11-21 ENCOUNTER — APPOINTMENT (OUTPATIENT)
Dept: WOMENS IMAGING | Facility: HOSPITAL | Age: 72
End: 2022-11-21

## 2022-11-21 PROCEDURE — 77063 BREAST TOMOSYNTHESIS BI: CPT | Performed by: RADIOLOGY

## 2022-11-21 PROCEDURE — 77067 SCR MAMMO BI INCL CAD: CPT | Performed by: RADIOLOGY

## 2022-12-14 ENCOUNTER — APPOINTMENT (OUTPATIENT)
Dept: NUCLEAR MEDICINE | Facility: HOSPITAL | Age: 72
End: 2022-12-14

## 2022-12-28 ENCOUNTER — HOSPITAL ENCOUNTER (OUTPATIENT)
Dept: CT IMAGING | Facility: HOSPITAL | Age: 72
Discharge: HOME | End: 2022-12-28
Admitting: INTERNAL MEDICINE
Payer: MEDICARE

## 2022-12-28 ENCOUNTER — HOSPITAL ENCOUNTER (OUTPATIENT)
Dept: NUCLEAR MEDICINE | Facility: HOSPITAL | Age: 72
Discharge: HOME | End: 2022-12-28

## 2022-12-28 DIAGNOSIS — C50.512 MALIGNANT NEOPLASM OF LOWER-OUTER QUADRANT OF LEFT BREAST OF FEMALE, ESTROGEN RECEPTOR POSITIVE: ICD-10-CM

## 2022-12-28 DIAGNOSIS — C79.2 CARCINOMA OF LEFT BREAST METASTATIC TO SKIN: ICD-10-CM

## 2022-12-28 DIAGNOSIS — Z17.0 MALIGNANT NEOPLASM OF LOWER-OUTER QUADRANT OF LEFT BREAST OF FEMALE, ESTROGEN RECEPTOR POSITIVE: ICD-10-CM

## 2022-12-28 DIAGNOSIS — C50.912 CARCINOMA OF LEFT BREAST METASTATIC TO SKIN: ICD-10-CM

## 2022-12-28 LAB — CREAT BLDA-MCNC: 0.7 MG/DL (ref 0.6–1.3)

## 2022-12-28 PROCEDURE — 71260 CT THORAX DX C+: CPT

## 2022-12-28 PROCEDURE — 25010000002 IOPAMIDOL 61 % SOLUTION: Performed by: INTERNAL MEDICINE

## 2022-12-28 PROCEDURE — 82565 ASSAY OF CREATININE: CPT

## 2022-12-28 PROCEDURE — A9503 TC99M MEDRONATE: HCPCS | Performed by: INTERNAL MEDICINE

## 2022-12-28 PROCEDURE — 78306 BONE IMAGING WHOLE BODY: CPT

## 2022-12-28 PROCEDURE — 0 TECHNETIUM MEDRONATE KIT: Performed by: INTERNAL MEDICINE

## 2022-12-28 PROCEDURE — 74177 CT ABD & PELVIS W/CONTRAST: CPT

## 2022-12-28 RX ORDER — TC 99M MEDRONATE 20 MG/10ML
19.6 INJECTION, POWDER, LYOPHILIZED, FOR SOLUTION INTRAVENOUS
Status: COMPLETED | OUTPATIENT
Start: 2022-12-28 | End: 2022-12-28

## 2022-12-28 RX ADMIN — IOPAMIDOL 85 ML: 612 INJECTION, SOLUTION INTRAVENOUS at 11:15

## 2022-12-28 RX ADMIN — Medication 19.6 MILLICURIE: at 10:00

## 2023-01-06 ENCOUNTER — LAB (OUTPATIENT)
Dept: LAB | Facility: HOSPITAL | Age: 73
End: 2023-01-06
Payer: MEDICARE

## 2023-01-06 ENCOUNTER — OFFICE VISIT (OUTPATIENT)
Dept: ONCOLOGY | Facility: CLINIC | Age: 73
End: 2023-01-06
Payer: MEDICARE

## 2023-01-06 VITALS
BODY MASS INDEX: 33.21 KG/M2 | HEIGHT: 62 IN | HEART RATE: 79 BPM | DIASTOLIC BLOOD PRESSURE: 74 MMHG | RESPIRATION RATE: 16 BRPM | OXYGEN SATURATION: 97 % | SYSTOLIC BLOOD PRESSURE: 146 MMHG | TEMPERATURE: 97.1 F | WEIGHT: 180.5 LBS

## 2023-01-06 DIAGNOSIS — C50.912 CARCINOMA OF LEFT BREAST METASTATIC TO SKIN: ICD-10-CM

## 2023-01-06 DIAGNOSIS — C79.2 CARCINOMA OF LEFT BREAST METASTATIC TO SKIN: Primary | ICD-10-CM

## 2023-01-06 DIAGNOSIS — C50.912 CARCINOMA OF LEFT BREAST METASTATIC TO SKIN: Primary | ICD-10-CM

## 2023-01-06 DIAGNOSIS — C79.2 CARCINOMA OF LEFT BREAST METASTATIC TO SKIN: ICD-10-CM

## 2023-01-06 DIAGNOSIS — C50.512 MALIGNANT NEOPLASM OF LOWER-OUTER QUADRANT OF LEFT BREAST OF FEMALE, ESTROGEN RECEPTOR POSITIVE: ICD-10-CM

## 2023-01-06 DIAGNOSIS — Z17.0 MALIGNANT NEOPLASM OF LOWER-OUTER QUADRANT OF LEFT BREAST OF FEMALE, ESTROGEN RECEPTOR POSITIVE: ICD-10-CM

## 2023-01-06 LAB
ALBUMIN SERPL-MCNC: 4.3 G/DL (ref 3.5–5.2)
ALBUMIN/GLOB SERPL: 1.3 G/DL (ref 1.1–2.4)
ALP SERPL-CCNC: 81 U/L (ref 38–116)
ALT SERPL W P-5'-P-CCNC: 20 U/L (ref 0–33)
ANION GAP SERPL CALCULATED.3IONS-SCNC: 11.9 MMOL/L (ref 5–15)
AST SERPL-CCNC: 25 U/L (ref 0–32)
BASOPHILS # BLD AUTO: 0.06 10*3/MM3 (ref 0–0.2)
BASOPHILS NFR BLD AUTO: 1 % (ref 0–1.5)
BILIRUB SERPL-MCNC: 0.3 MG/DL (ref 0.2–1.2)
BUN SERPL-MCNC: 21 MG/DL (ref 6–20)
BUN/CREAT SERPL: 30.9 (ref 7.3–30)
CALCIUM SPEC-SCNC: 9.8 MG/DL (ref 8.5–10.2)
CANCER AG15-3 SERPL-ACNC: 20.4 U/ML
CHLORIDE SERPL-SCNC: 100 MMOL/L (ref 98–107)
CO2 SERPL-SCNC: 26.1 MMOL/L (ref 22–29)
CREAT SERPL-MCNC: 0.68 MG/DL (ref 0.6–1.1)
DEPRECATED RDW RBC AUTO: 44.5 FL (ref 37–54)
EGFRCR SERPLBLD CKD-EPI 2021: 91.5 ML/MIN/1.73
EOSINOPHIL # BLD AUTO: 0.1 10*3/MM3 (ref 0–0.4)
EOSINOPHIL NFR BLD AUTO: 1.6 % (ref 0.3–6.2)
ERYTHROCYTE [DISTWIDTH] IN BLOOD BY AUTOMATED COUNT: 13.2 % (ref 12.3–15.4)
GLOBULIN UR ELPH-MCNC: 3.4 GM/DL (ref 1.8–3.5)
GLUCOSE SERPL-MCNC: 130 MG/DL (ref 74–124)
HCT VFR BLD AUTO: 38.3 % (ref 34–46.6)
HGB BLD-MCNC: 12.6 G/DL (ref 12–15.9)
IMM GRANULOCYTES # BLD AUTO: 0.01 10*3/MM3 (ref 0–0.05)
IMM GRANULOCYTES NFR BLD AUTO: 0.2 % (ref 0–0.5)
LYMPHOCYTES # BLD AUTO: 1.91 10*3/MM3 (ref 0.7–3.1)
LYMPHOCYTES NFR BLD AUTO: 31.3 % (ref 19.6–45.3)
MCH RBC QN AUTO: 30.7 PG (ref 26.6–33)
MCHC RBC AUTO-ENTMCNC: 32.9 G/DL (ref 31.5–35.7)
MCV RBC AUTO: 93.2 FL (ref 79–97)
MONOCYTES # BLD AUTO: 0.56 10*3/MM3 (ref 0.1–0.9)
MONOCYTES NFR BLD AUTO: 9.2 % (ref 5–12)
NEUTROPHILS NFR BLD AUTO: 3.47 10*3/MM3 (ref 1.7–7)
NEUTROPHILS NFR BLD AUTO: 56.7 % (ref 42.7–76)
NRBC BLD AUTO-RTO: 0 /100 WBC (ref 0–0.2)
PLATELET # BLD AUTO: 169 10*3/MM3 (ref 140–450)
PMV BLD AUTO: 9 FL (ref 6–12)
POTASSIUM SERPL-SCNC: 4.1 MMOL/L (ref 3.5–4.7)
PROT SERPL-MCNC: 7.7 G/DL (ref 6.3–8)
RBC # BLD AUTO: 4.11 10*6/MM3 (ref 3.77–5.28)
SODIUM SERPL-SCNC: 138 MMOL/L (ref 134–145)
WBC NRBC COR # BLD: 6.11 10*3/MM3 (ref 3.4–10.8)

## 2023-01-06 PROCEDURE — 86300 IMMUNOASSAY TUMOR CA 15-3: CPT | Performed by: INTERNAL MEDICINE

## 2023-01-06 PROCEDURE — 80053 COMPREHEN METABOLIC PANEL: CPT

## 2023-01-06 PROCEDURE — 85025 COMPLETE CBC W/AUTO DIFF WBC: CPT

## 2023-01-06 PROCEDURE — 36415 COLL VENOUS BLD VENIPUNCTURE: CPT

## 2023-01-06 PROCEDURE — 99214 OFFICE O/P EST MOD 30 MIN: CPT | Performed by: INTERNAL MEDICINE

## 2023-01-06 RX ORDER — LISINOPRIL AND HYDROCHLOROTHIAZIDE 20; 12.5 MG/1; MG/1
TABLET ORAL
COMMUNITY
Start: 2022-11-16

## 2023-01-06 NOTE — PROGRESS NOTES
Subjective     REASONS FOR FOLLOWUP:   1. Chest wall recurrence of breast cancer, ER/NV positive, HER-2 negative, grade 2 with no evidence of distant spread. Arimidex started in 12/2014.   2. T1cN0, grade 1, ER/NV positive breast cancer treated with CMF and 5 years of tamoxifen in 1995.   3. Moderate fatigue from Arimidex.   4. Response clinically and by CT scan on 03/30/2015.   5. Faslodex added to Arimidex in July 2015 to optimize response for surgery.   6. Decision made to forego surgery and do radiation, continue Arimidex in 11/2015.        History of Present Illness  patient is a 65-year-old lady with a locally recurrent breast cancer 24 years from diagnosis currently on Arimidex since December 2014-  8  years ago  She is exercising and dieting and her depression and attitude are much better.    She has been vaccinated against COVID-19 and had a booster  The dermatologist scraped off an area on her left chest wall that looked a little different and thankfully this was also benign    she is tolerating Arimidex without any problems.   Mammogram from November 2022 is negative    Her tumor markers is back to 20 compared to 26 a year ago and her CAT scans are all negative except for possible new sub-6 mm nodule in the right lung that was not seen definitely previously      she has no lab abnormalities currently.    She is up-to-date with scopes  She has no cough or symptoms from her radiation scarring in the left lung apex.      Active Ambulatory Problems     Diagnosis Date Noted   • Malignant neoplasm of lower-outer quadrant of left female breast (HCC) 04/08/2016   • Carcinoma of left breast metastatic to skin (HCC) 04/08/2016   • Depression 04/18/2016   • Rupture of posterior cruciate ligament of left knee 07/28/2022   • Closed nondisplaced fracture of left patella 07/28/2022   • Acute medial meniscus tear of left knee 07/28/2022   • Osteoarthritis of left knee 07/28/2022   • Effusion of left knee 07/28/2022      Resolved Ambulatory Problems     Diagnosis Date Noted   • No Resolved Ambulatory Problems     Past Medical History:   Diagnosis Date   • Arthritis    • Diabetes type 2, controlled (HCC)    • Hypercholesterolemia    • Malignant neoplasm of breast (female) (HCC)    • Peripheral autonomic neuropathy due to diabetes mellitus (HCC)        PAST SURGICAL HISTORY: None except left breast mastectomy .     OB/GYN HISTORY: Menarche was at age 12, menopause in her late 40s induced by CMF chemotherapy. She is  3, para 2 with one miscarriage. First childbirth was at age 25. She did not breast-feed. Never took hormonal replacement.     ONCOLOGIC HISTORY: History of previous dates can be found in a separate document.   The patient was seen on 2015 after getting a 2nd opinion of Dr. Louis Brooks and seeing her surgeon in [place] again. Her circulating tumor cells were present and we felt this argued against doing surgery and we opted to do radiation to the chest wall. Continue Arimidex for the time being and for progression switch to Faslodex and Ibrance. CT scans of the chest, abdomen, pelvis, and bone scan dated 10/07/2015 showed no evidence of systemic disease and just chest wall disease, which has all respon ded to her hormonal therapy and fatty liver.  Patient seen on 2016 after radiation with a PET scan that shows some radiation lung damage which is PET positive with a low SUV and no other signs of metastatic disease.  Arimidex continued    Her stool sample showed fecal blood and she had a colonoscopy with 3 adenomatous polyps removed - EGD was done and showed H. pylori infection and she was treated for this.  She is at the [facility] exercising with Silver sneakers and losing weight and overall has no complaints      SOCIAL HISTORY: She is . 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 She is a nondrinker.     FAMILY HISTORY: Father  at 59 o f an MI.  Mother at 68 of lung cancer and was a smoker. She has paternal grandmother with breast cancer at age 47, a paternal aunt with breast cancer at age 59 and pancreatic cancer at 88. She has a paternal first cousin with breast cancer at 44, another p a ternal cousin with breast cancer at 44, another cousin with breast cancer at 61, and another paternal first cousin with breast cancer at 66. BRCA testing was done on the 44-year-old cousin and was negative. No history of ovarian cancer. She has a paterna l first cousin with colon cancer.     Review of Systems   Constitutional: Negative for chills, fatigue and fever.   HENT: Negative.    Respiratory: Negative.  Negative for chest tightness and shortness of breath.    Cardiovascular: Negative.  Negative for chest pain.   Gastrointestinal: Negative.  Negative for constipation, diarrhea, nausea and vomiting.   Genitourinary: Negative.    Musculoskeletal: Negative.  Negative for arthralgias and back pain.   Neurological: Negative for dizziness and headaches.   Psychiatric/Behavioral: Negative for sleep disturbance. The patient is not nervous/anxious.    All other systems reviewed and are negative.     A comprehensive 14 point review of systems was performed and was negative except as mentioned.  I have reviewed and confirmed the accuracy of the ROS as documented by the MA/LPN/RN ZOILA Pacheco      Current Outpatient Medications on File Prior to Visit   Medication Sig Dispense Refill   • anastrozole (ARIMIDEX) 1 MG tablet Take 1 tablet by mouth Daily. 90 tablet 2   • Calcium Carbonate-Vitamin D (CALCIUM-D PO) Take  by mouth Daily.     • Cholecalciferol (VITAMIN D) 1000 UNITS tablet Take 2,000 Units by mouth.     • lisinopril-hydrochlorothiazide (PRINZIDE,ZESTORETIC) 20-12.5 MG per tablet TAKE TWO TABLETS BY MOUTH EVERY DAY FOR BLOOD PRESSURE     • metFORMIN ER (GLUCOPHAGE-XR) 500 MG 24 hr tablet Take 500 mg by mouth 2 (Two) Times a Day.     • rosuvastatin (CRESTOR) 20 MG  tablet Take 20 mg by mouth daily.     • vitamin C (ASCORBIC ACID) 250 MG tablet Take 250 mg by mouth Daily.     • Zinc 50 MG capsule      • [DISCONTINUED] lisinopril (PRINIVIL,ZESTRIL) 40 MG tablet TAKE 1 TABLET BY MOUTH EVERY DAY FOR BLOOD PRESSURE  1     No current facility-administered medications on file prior to visit.         ALLERGIES:  No Known Allergies    Objective      Vitals:    01/06/23 1332   BP: 146/74   Pulse: 79   Resp: 16   Temp: 97.1 °F (36.2 °C)   TempSrc: Temporal   SpO2: 97%   Weight: 81.9 kg (180 lb 8 oz)   Height: 157.5 cm (62.01\")   PainSc: 0-No pain     Current Status 1/6/2023   ECOG score 0       Physical Exam   Pulmonary/Chest:           GENERAL:  Well-developed, well-nourished in no acute distress.   SKIN:  Warm, dry without rashes, purpura or petechiae.  HEAD:  Normocephalic.  NECK:  Supple with good range of motion; no thyromegaly or masses, no JVD.  LYMPHATICS:  No cervical, supraclavicular, axillary or inguinal adenopathy.  CHEST:  Lungs clear to percussion and auscultation. Good airflow.  BREASTS: Right breast with an inverted nipple and no masses-left chest wall with significant radiation skin changes and no residual masses appreciated- telangiectasia in the skin from radiation damage-  CARDIAC:  Regular rate and rhythm without murmurs, rubs or gallops. Normal S1,S2.  ABDOMEN:  Soft, nontender with no organomegaly or masses.  EXTREMITIES:  No clubbing, cyanosis or edema.  NEUROLOGICAL:  Cranial Nerves II-XII grossly intact.  No focal neurological deficits.  PSYCHIATRIC:  Normal affect and mood.    I have reexamined the patient and the results are consistent with the previously documented exam. Juanita Casey, PCT       RECENT LABS:  Hematology WBC   Date Value Ref Range Status   01/06/2023 6.11 3.40 - 10.80 10*3/mm3 Final     RBC   Date Value Ref Range Status   01/06/2023 4.11 3.77 - 5.28 10*6/mm3 Final     Hemoglobin   Date Value Ref Range Status   01/06/2023 12.6 12.0 - 15.9 g/dL  Final     Hematocrit   Date Value Ref Range Status   01/06/2023 38.3 34.0 - 46.6 % Final     Platelets   Date Value Ref Range Status   01/06/2023 169 140 - 450 10*3/mm3 Final      CT CAP  IMPRESSION:  Stable examination. The equivocal sub-6 mm right upper lobe  pulmonary nodule is unclear if it is new or not and is suspected to be  benign. There is otherwise no new abnormality or convincing evidence for metastatic disease.   This report was finalized on 12/30/2022     BONE SCAN  IMPRESSION;  No interval change or scintigraphic evidence for bony metastaticdisease.   This report was finalized on 12/28/2022    Assessment & Plan   1.  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  Anxiety and depression improved    3.  Significant weight gain with hormonal blockade currently improving with diet and exercise 30 pound weight loss in 2019    4   Numbness in the left upper arm and intrascapular regions?  Related to radiation-better     5.  Vaccinated and booster against COVID-19    Plan  1.  Continue Arimidex 1 mg daily  2.Return in 6 months for scans to follow-up on lung nodule  3.  Mammogram annually    1/6/2023      CC:

## 2023-02-22 RX ORDER — ANASTROZOLE 1 MG/1
TABLET ORAL
Qty: 90 TABLET | Refills: 2 | Status: SHIPPED | OUTPATIENT
Start: 2023-02-22

## 2023-07-13 ENCOUNTER — APPOINTMENT (OUTPATIENT)
Dept: CT IMAGING | Facility: HOSPITAL | Age: 73
End: 2023-07-13
Payer: MEDICARE

## 2023-07-25 ENCOUNTER — OFFICE VISIT (OUTPATIENT)
Dept: ONCOLOGY | Facility: CLINIC | Age: 73
End: 2023-07-25
Payer: MEDICARE

## 2023-07-25 ENCOUNTER — LAB (OUTPATIENT)
Dept: LAB | Facility: HOSPITAL | Age: 73
End: 2023-07-25
Payer: MEDICARE

## 2023-07-25 VITALS
BODY MASS INDEX: 31.93 KG/M2 | OXYGEN SATURATION: 96 % | HEIGHT: 62 IN | HEART RATE: 77 BPM | RESPIRATION RATE: 16 BRPM | DIASTOLIC BLOOD PRESSURE: 82 MMHG | TEMPERATURE: 97.2 F | WEIGHT: 173.5 LBS | SYSTOLIC BLOOD PRESSURE: 130 MMHG

## 2023-07-25 DIAGNOSIS — C50.912 CARCINOMA OF LEFT BREAST METASTATIC TO SKIN: ICD-10-CM

## 2023-07-25 DIAGNOSIS — C79.2 CARCINOMA OF LEFT BREAST METASTATIC TO SKIN: ICD-10-CM

## 2023-07-25 DIAGNOSIS — Z17.0 MALIGNANT NEOPLASM OF LOWER-OUTER QUADRANT OF LEFT BREAST OF FEMALE, ESTROGEN RECEPTOR POSITIVE: Primary | ICD-10-CM

## 2023-07-25 DIAGNOSIS — Z79.811 ENCOUNTER FOR MONITORING AROMATASE INHIBITOR THERAPY: ICD-10-CM

## 2023-07-25 DIAGNOSIS — Z51.81 ENCOUNTER FOR MONITORING AROMATASE INHIBITOR THERAPY: ICD-10-CM

## 2023-07-25 DIAGNOSIS — C50.512 MALIGNANT NEOPLASM OF LOWER-OUTER QUADRANT OF LEFT BREAST OF FEMALE, ESTROGEN RECEPTOR POSITIVE: Primary | ICD-10-CM

## 2023-07-25 LAB
ALBUMIN SERPL-MCNC: 4.3 G/DL (ref 3.5–5.2)
ALBUMIN/GLOB SERPL: 1.5 G/DL
ALP SERPL-CCNC: 81 U/L (ref 39–117)
ALT SERPL W P-5'-P-CCNC: 14 U/L (ref 1–33)
ANION GAP SERPL CALCULATED.3IONS-SCNC: 16 MMOL/L (ref 5–15)
AST SERPL-CCNC: 22 U/L (ref 1–32)
BASOPHILS # BLD AUTO: 0.05 10*3/MM3 (ref 0–0.2)
BASOPHILS NFR BLD AUTO: 0.7 % (ref 0–1.5)
BILIRUB SERPL-MCNC: 0.3 MG/DL (ref 0–1.2)
BUN SERPL-MCNC: 16 MG/DL (ref 8–23)
BUN/CREAT SERPL: 19.8 (ref 7–25)
CALCIUM SPEC-SCNC: 9.7 MG/DL (ref 8.6–10.5)
CANCER AG15-3 SERPL-ACNC: 18.4 U/ML
CHLORIDE SERPL-SCNC: 102 MMOL/L (ref 98–107)
CO2 SERPL-SCNC: 23 MMOL/L (ref 22–29)
CREAT SERPL-MCNC: 0.81 MG/DL (ref 0.6–1.1)
DEPRECATED RDW RBC AUTO: 45.9 FL (ref 37–54)
EGFRCR SERPLBLD CKD-EPI 2021: 76.3 ML/MIN/1.73
EOSINOPHIL # BLD AUTO: 0.11 10*3/MM3 (ref 0–0.4)
EOSINOPHIL NFR BLD AUTO: 1.5 % (ref 0.3–6.2)
ERYTHROCYTE [DISTWIDTH] IN BLOOD BY AUTOMATED COUNT: 13.4 % (ref 12.3–15.4)
GLOBULIN UR ELPH-MCNC: 2.9 GM/DL
GLUCOSE SERPL-MCNC: 83 MG/DL (ref 65–99)
HCT VFR BLD AUTO: 37.4 % (ref 34–46.6)
HGB BLD-MCNC: 12.4 G/DL (ref 12–15.9)
IMM GRANULOCYTES # BLD AUTO: 0.02 10*3/MM3 (ref 0–0.05)
IMM GRANULOCYTES NFR BLD AUTO: 0.3 % (ref 0–0.5)
LYMPHOCYTES # BLD AUTO: 2.05 10*3/MM3 (ref 0.7–3.1)
LYMPHOCYTES NFR BLD AUTO: 28.8 % (ref 19.6–45.3)
MCH RBC QN AUTO: 31 PG (ref 26.6–33)
MCHC RBC AUTO-ENTMCNC: 33.2 G/DL (ref 31.5–35.7)
MCV RBC AUTO: 93.5 FL (ref 79–97)
MONOCYTES # BLD AUTO: 0.67 10*3/MM3 (ref 0.1–0.9)
MONOCYTES NFR BLD AUTO: 9.4 % (ref 5–12)
NEUTROPHILS NFR BLD AUTO: 4.21 10*3/MM3 (ref 1.7–7)
NEUTROPHILS NFR BLD AUTO: 59.3 % (ref 42.7–76)
NRBC BLD AUTO-RTO: 0 /100 WBC (ref 0–0.2)
PLATELET # BLD AUTO: 180 10*3/MM3 (ref 140–450)
PMV BLD AUTO: 9.1 FL (ref 6–12)
POTASSIUM SERPL-SCNC: 3.9 MMOL/L (ref 3.5–5.2)
PROT SERPL-MCNC: 7.2 G/DL (ref 6–8.5)
RBC # BLD AUTO: 4 10*6/MM3 (ref 3.77–5.28)
SODIUM SERPL-SCNC: 141 MMOL/L (ref 136–145)
WBC NRBC COR # BLD: 7.11 10*3/MM3 (ref 3.4–10.8)

## 2023-07-25 PROCEDURE — 36415 COLL VENOUS BLD VENIPUNCTURE: CPT

## 2023-07-25 PROCEDURE — 80053 COMPREHEN METABOLIC PANEL: CPT

## 2023-07-25 PROCEDURE — 85025 COMPLETE CBC W/AUTO DIFF WBC: CPT

## 2023-07-25 PROCEDURE — 86300 IMMUNOASSAY TUMOR CA 15-3: CPT | Performed by: INTERNAL MEDICINE

## 2023-07-25 RX ORDER — SEMAGLUTIDE 2.68 MG/ML
INJECTION, SOLUTION SUBCUTANEOUS
COMMUNITY
Start: 2023-07-03

## 2023-07-25 RX ORDER — ROSUVASTATIN CALCIUM 20 MG/1
1 TABLET, COATED ORAL EVERY EVENING
COMMUNITY

## 2023-07-25 RX ORDER — AMLODIPINE BESYLATE 5 MG/1
TABLET ORAL
COMMUNITY

## 2023-07-25 NOTE — PROGRESS NOTES
Subjective     REASONS FOR FOLLOWUP:   Chest wall recurrence of breast cancer, ER/MO positive, HER-2 negative, grade 2 with no evidence of distant spread. Arimidex started in 12/2014.   T1cN0, grade 1, ER/MO positive breast cancer treated with CMF and 5 years of tamoxifen in 1995.   Moderate fatigue from Arimidex.   Response clinically and by CT scan on 03/30/2015.   Faslodex added to Arimidex in July 2015 to optimize response for surgery.   Decision made to forego surgery and do radiation, continue Arimidex in 11/2015.        History of Present Illness  patient is a 65-year-old lady with a locally recurrent breast cancer 24 years from diagnosis currently on Arimidex since December 2014-  8.5  years ago  She is exercising and dieting and her depression and attitude are much better.    The dermatologist scraped off an area on her left chest wall that looked a little different and thankfully this was also benign    she is tolerating Arimidex without any problems.   Mammogram from November 2022 is negative    Her tumor markers is back to 20 compared to 26 a year ago and her CAT scans are all negative from last week except for  sub-6 mm nodule in the right lung that was not seen definitely previously which this time looks more like a vascular abnormality     she has no lab abnormalities currently.    She is up-to-date with scopes  She has no cough or symptoms from her radiation scarring in the left lung apex.      Active Ambulatory Problems     Diagnosis Date Noted    Malignant neoplasm of lower-outer quadrant of left female breast 04/08/2016    Carcinoma of left breast metastatic to skin 04/08/2016    Depression 04/18/2016    Rupture of posterior cruciate ligament of left knee 07/28/2022    Closed nondisplaced fracture of left patella 07/28/2022    Acute medial meniscus tear of left knee 07/28/2022    Osteoarthritis of left knee 07/28/2022    Effusion of left knee 07/28/2022     Resolved Ambulatory Problems      Diagnosis Date Noted    No Resolved Ambulatory Problems     Past Medical History:   Diagnosis Date    Arthritis     Diabetes type 2, controlled     Hypercholesterolemia     Malignant neoplasm of breast (female)     Peripheral autonomic neuropathy due to diabetes mellitus        PAST SURGICAL HISTORY: None except left breast mastectomy .     OB/GYN HISTORY: Menarche was at age 12, menopause in her late 40s induced by CMF chemotherapy. She is  3, para 2 with one miscarriage. First childbirth was at age 25. She did not breast-feed. Never took hormonal replacement.     ONCOLOGIC HISTORY: History of previous dates can be found in a separate document.   The patient was seen on 2015 after getting a 2nd opinion of Dr. Louis Brooks and seeing her surgeon in [place] again. Her circulating tumor cells were present and we felt this argued against doing surgery and we opted to do radiation to the chest wall. Continue Arimidex for the time being and for progression switch to Faslodex and Ibrance. CT scans of the chest, abdomen, pelvis, and bone scan dated 10/07/2015 showed no evidence of systemic disease and just chest wall disease, which has all respon ded to her hormonal therapy and fatty liver.  Patient seen on 2016 after radiation with a PET scan that shows some radiation lung damage which is PET positive with a low SUV and no other signs of metastatic disease.  Arimidex continued    Her stool sample showed fecal blood and she had a colonoscopy with 3 adenomatous polyps removed - EGD was done and showed H. pylori infection and she was treated for this.  She is at the [facility] exercising with Silver sneakers and losing weight and overall has no complaints      SOCIAL HISTORY: She is . 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 She is a nondrinker.     FAMILY HISTORY: Father  at 59 o f an MI. Mother at 68 of lung cancer and was a smoker. She has paternal  grandmother with breast cancer at age 47, a paternal aunt with breast cancer at age 59 and pancreatic cancer at 88. She has a paternal first cousin with breast cancer at 44, another p a ternal cousin with breast cancer at 44, another cousin with breast cancer at 61, and another paternal first cousin with breast cancer at 66. BRCA testing was done on the 44-year-old cousin and was negative. No history of ovarian cancer. She has a paterna l first cousin with colon cancer.     Review of Systems   Constitutional:  Negative for chills, fatigue and fever.   HENT: Negative.     Respiratory: Negative.  Negative for chest tightness and shortness of breath.    Cardiovascular: Negative.  Negative for chest pain.   Gastrointestinal: Negative.  Negative for constipation, diarrhea, nausea and vomiting.   Genitourinary: Negative.    Musculoskeletal: Negative.  Negative for arthralgias and back pain.   Neurological:  Negative for dizziness and headaches.   Psychiatric/Behavioral:  Negative for sleep disturbance. The patient is not nervous/anxious.    All other systems reviewed and are negative.   A comprehensive 14 point review of systems was performed and was negative except as mentioned.  I have reviewed and confirmed the accuracy of the ROS as documented by the MA/LPN/RN Devin Mattson MD      Current Outpatient Medications on File Prior to Visit   Medication Sig Dispense Refill    amLODIPine (NORVASC) 5 MG tablet Take 1 tablet every day by oral route.      anastrozole (ARIMIDEX) 1 MG tablet TAKE ONE TABLET BY MOUTH EVERY DAY 90 tablet 2    Calcium Carbonate-Vitamin D (CALCIUM-D PO) Take  by mouth Daily.      Cholecalciferol (VITAMIN D) 1000 UNITS tablet Take 2 tablets by mouth.      lisinopril-hydrochlorothiazide (PRINZIDE,ZESTORETIC) 20-12.5 MG per tablet TAKE TWO TABLETS BY MOUTH EVERY DAY FOR BLOOD PRESSURE      Ozempic, 2 MG/DOSE, 8 MG/3ML solution pen-injector Inject 2 mg every week by subcutaneous route.       "rosuvastatin (CRESTOR) 20 MG tablet Take 1 tablet by mouth Daily.      rosuvastatin (CRESTOR) 20 MG tablet Take 1 tablet by mouth Every Evening.      vitamin C (ASCORBIC ACID) 250 MG tablet Take 1 tablet by mouth Daily.      Zinc 50 MG capsule       metFORMIN ER (GLUCOPHAGE-XR) 500 MG 24 hr tablet Take 500 mg by mouth 2 (Two) Times a Day. (Patient not taking: Reported on 7/25/2023)       No current facility-administered medications on file prior to visit.         ALLERGIES:  No Known Allergies    Objective      Vitals:    07/25/23 1407   BP: 130/82   Pulse: 77   Resp: 16   Temp: 97.2 °F (36.2 °C)   TempSrc: Temporal   SpO2: 96%   Weight: 78.7 kg (173 lb 8 oz)   Height: 157.5 cm (62.01\")   PainSc: 0-No pain         7/25/2023     2:09 PM   Current Status   ECOG score 0       Physical Exam   Pulmonary/Chest:         GENERAL:  Well-developed, well-nourished in no acute distress.   SKIN:  Warm, dry without rashes, purpura or petechiae.  HEAD:  Normocephalic.  NECK:  Supple with good range of motion; no thyromegaly or masses, no JVD.  LYMPHATICS:  No cervical, supraclavicular, axillary or inguinal adenopathy.  CHEST:  Lungs clear to percussion and auscultation. Good airflow.  BREASTS: Right breast with an inverted nipple and no masses-left chest wall with significant radiation skin changes and no residual masses appreciated- telangiectasia in the skin from radiation damage-  CARDIAC:  Regular rate and rhythm without murmurs, rubs or gallops. Normal S1,S2.  ABDOMEN:  Soft, nontender with no organomegaly or masses.  EXTREMITIES:  No clubbing, cyanosis or edema.  NEUROLOGICAL:  Cranial Nerves II-XII grossly intact.  No focal neurological deficits.  PSYCHIATRIC:  Normal affect and mood.    I have reexamined the patient and the results are consistent with the previously documented exam. Devin Mattson MD       RECENT LABS:  Hematology WBC   Date Value Ref Range Status   07/25/2023 7.11 3.40 - 10.80 10*3/mm3 Final     RBC "   Date Value Ref Range Status   07/25/2023 4.00 3.77 - 5.28 10*6/mm3 Final     Hemoglobin   Date Value Ref Range Status   07/25/2023 12.4 12.0 - 15.9 g/dL Final     Hematocrit   Date Value Ref Range Status   07/25/2023 37.4 34.0 - 46.6 % Final     Platelets   Date Value Ref Range Status   07/25/2023 180 140 - 450 10*3/mm3 Final      CT CAP  IMPRESSION:  Stable examination. The equivocal sub-6 mm right upper lobe  pulmonary nodule is unclear if it is new or not and is suspected to be  benign. There is otherwise no new abnormality or convincing evidence for metastatic disease.   This report was finalized on 12/30/2022     BONE SCAN  IMPRESSION;  No interval change or scintigraphic evidence for bony metastaticdisease.   This report was finalized on 12/28/2022    Assessment & Plan   1.  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  Anxiety and depression improved    3.  Significant weight gain with hormonal blockade currently improving with diet and exercise 30 pound weight loss in 2019    4   Numbness in the left upper arm and intrascapular regions?  Related to radiation-better     5.  GENETIC  TESTING NEGATIVE    Plan  1.  Continue Arimidex 1 mg daily  2.Return in 6 months for follow-up   3.  Mammogram annually in 11/23    I spent 35 total minutes, face-to-face, caring for Charles today. Greater than 50% of this time involved counseling and/or coordination of care as documented within this note.    7/25/2023      CC:

## 2023-11-29 ENCOUNTER — APPOINTMENT (OUTPATIENT)
Dept: WOMENS IMAGING | Facility: HOSPITAL | Age: 73
End: 2023-11-29
Payer: MEDICARE

## 2023-11-29 PROCEDURE — 77067 SCR MAMMO BI INCL CAD: CPT | Performed by: RADIOLOGY

## 2023-11-29 PROCEDURE — 77063 BREAST TOMOSYNTHESIS BI: CPT | Performed by: RADIOLOGY

## 2023-12-28 ENCOUNTER — LAB (OUTPATIENT)
Dept: LAB | Facility: HOSPITAL | Age: 73
End: 2023-12-28
Payer: MEDICARE

## 2023-12-28 ENCOUNTER — OFFICE VISIT (OUTPATIENT)
Dept: ONCOLOGY | Facility: CLINIC | Age: 73
End: 2023-12-28
Payer: MEDICARE

## 2023-12-28 VITALS
HEIGHT: 62 IN | BODY MASS INDEX: 33.64 KG/M2 | RESPIRATION RATE: 16 BRPM | SYSTOLIC BLOOD PRESSURE: 136 MMHG | DIASTOLIC BLOOD PRESSURE: 67 MMHG | HEART RATE: 92 BPM | OXYGEN SATURATION: 96 % | WEIGHT: 182.8 LBS | TEMPERATURE: 98.2 F

## 2023-12-28 DIAGNOSIS — Z17.0 MALIGNANT NEOPLASM OF LOWER-OUTER QUADRANT OF LEFT BREAST OF FEMALE, ESTROGEN RECEPTOR POSITIVE: Primary | ICD-10-CM

## 2023-12-28 DIAGNOSIS — C50.512 MALIGNANT NEOPLASM OF LOWER-OUTER QUADRANT OF LEFT BREAST OF FEMALE, ESTROGEN RECEPTOR POSITIVE: ICD-10-CM

## 2023-12-28 DIAGNOSIS — C79.2 CARCINOMA OF LEFT BREAST METASTATIC TO SKIN: ICD-10-CM

## 2023-12-28 DIAGNOSIS — C50.512 MALIGNANT NEOPLASM OF LOWER-OUTER QUADRANT OF LEFT BREAST OF FEMALE, ESTROGEN RECEPTOR POSITIVE: Primary | ICD-10-CM

## 2023-12-28 DIAGNOSIS — C50.912 CARCINOMA OF LEFT BREAST METASTATIC TO SKIN: ICD-10-CM

## 2023-12-28 DIAGNOSIS — Z17.0 MALIGNANT NEOPLASM OF LOWER-OUTER QUADRANT OF LEFT BREAST OF FEMALE, ESTROGEN RECEPTOR POSITIVE: ICD-10-CM

## 2023-12-28 LAB
ALBUMIN SERPL-MCNC: 4 G/DL (ref 3.5–5.2)
ALBUMIN/GLOB SERPL: 1.3 G/DL
ALP SERPL-CCNC: 89 U/L (ref 39–117)
ALT SERPL W P-5'-P-CCNC: 16 U/L (ref 1–33)
ANION GAP SERPL CALCULATED.3IONS-SCNC: 11.4 MMOL/L (ref 5–15)
AST SERPL-CCNC: 26 U/L (ref 1–32)
BASOPHILS # BLD AUTO: 0.07 10*3/MM3 (ref 0–0.2)
BASOPHILS NFR BLD AUTO: 1 % (ref 0–1.5)
BILIRUB SERPL-MCNC: 0.3 MG/DL (ref 0–1.2)
BUN SERPL-MCNC: 33 MG/DL (ref 8–23)
BUN/CREAT SERPL: 29.7 (ref 7–25)
CALCIUM SPEC-SCNC: 9.5 MG/DL (ref 8.6–10.5)
CHLORIDE SERPL-SCNC: 98 MMOL/L (ref 98–107)
CO2 SERPL-SCNC: 28.6 MMOL/L (ref 22–29)
CREAT SERPL-MCNC: 1.11 MG/DL (ref 0.57–1)
DEPRECATED RDW RBC AUTO: 43.9 FL (ref 37–54)
EGFRCR SERPLBLD CKD-EPI 2021: 51.9 ML/MIN/1.73
EOSINOPHIL # BLD AUTO: 0.13 10*3/MM3 (ref 0–0.4)
EOSINOPHIL NFR BLD AUTO: 1.9 % (ref 0.3–6.2)
ERYTHROCYTE [DISTWIDTH] IN BLOOD BY AUTOMATED COUNT: 12.9 % (ref 12.3–15.4)
GLOBULIN UR ELPH-MCNC: 3.1 GM/DL
GLUCOSE SERPL-MCNC: 254 MG/DL (ref 65–99)
HCT VFR BLD AUTO: 36.2 % (ref 34–46.6)
HGB BLD-MCNC: 12.1 G/DL (ref 12–15.9)
IMM GRANULOCYTES # BLD AUTO: 0.02 10*3/MM3 (ref 0–0.05)
IMM GRANULOCYTES NFR BLD AUTO: 0.3 % (ref 0–0.5)
LYMPHOCYTES # BLD AUTO: 1.97 10*3/MM3 (ref 0.7–3.1)
LYMPHOCYTES NFR BLD AUTO: 28.5 % (ref 19.6–45.3)
MCH RBC QN AUTO: 31.3 PG (ref 26.6–33)
MCHC RBC AUTO-ENTMCNC: 33.4 G/DL (ref 31.5–35.7)
MCV RBC AUTO: 93.8 FL (ref 79–97)
MONOCYTES # BLD AUTO: 0.49 10*3/MM3 (ref 0.1–0.9)
MONOCYTES NFR BLD AUTO: 7.1 % (ref 5–12)
NEUTROPHILS NFR BLD AUTO: 4.24 10*3/MM3 (ref 1.7–7)
NEUTROPHILS NFR BLD AUTO: 61.2 % (ref 42.7–76)
NRBC BLD AUTO-RTO: 0 /100 WBC (ref 0–0.2)
PLATELET # BLD AUTO: 188 10*3/MM3 (ref 140–450)
PMV BLD AUTO: 9.7 FL (ref 6–12)
POTASSIUM SERPL-SCNC: 4.3 MMOL/L (ref 3.5–5.2)
PROT SERPL-MCNC: 7.1 G/DL (ref 6–8.5)
RBC # BLD AUTO: 3.86 10*6/MM3 (ref 3.77–5.28)
SODIUM SERPL-SCNC: 138 MMOL/L (ref 136–145)
WBC NRBC COR # BLD AUTO: 6.92 10*3/MM3 (ref 3.4–10.8)

## 2023-12-28 PROCEDURE — 36415 COLL VENOUS BLD VENIPUNCTURE: CPT

## 2023-12-28 PROCEDURE — 85025 COMPLETE CBC W/AUTO DIFF WBC: CPT

## 2023-12-28 PROCEDURE — 80053 COMPREHEN METABOLIC PANEL: CPT

## 2023-12-28 NOTE — PROGRESS NOTES
Subjective     REASONS FOR FOLLOWUP:   Chest wall recurrence of breast cancer, ER/NM positive, HER-2 negative, grade 2 with no evidence of distant spread. Arimidex started in 12/2014.   T1cN0, grade 1, ER/NM positive breast cancer treated with CMF and 5 years of tamoxifen in 1995.   Moderate fatigue from Arimidex.   Response clinically and by CT scan on 03/30/2015.   Faslodex added to Arimidex in July 2015 to optimize response for surgery.   Decision made to forego surgery and do radiation, continue Arimidex in 11/2015.        History of Present Illness  patient is a 65-year-old lady with a locally recurrent breast cancer 24 years from diagnosis currently on Arimidex since December 2014-  9  years ago  She is exercising and dieting and her depression and attitude are much better.    The dermatologist scraped off an area on her left chest wall that looked a little different and thankfully this was also benign  Found an atypical mole on her left lower back and is waiting to see the dermatologist to have it removed    she is tolerating Arimidex without any problems.   Mammogram from November 2023 is negative    Her tumor markers is back to 20 compared to 26 a year ago and  she has no lab abnormalities currently.    She is up-to-date with scopes  She has no cough or symptoms from her radiation scarring in the left lung apex.    Scans are going to be done annually in July and this the markers go up    Active Ambulatory Problems     Diagnosis Date Noted    Malignant neoplasm of lower-outer quadrant of left female breast 04/08/2016    Carcinoma of left breast metastatic to skin 04/08/2016    Depression 04/18/2016    Rupture of posterior cruciate ligament of left knee 07/28/2022    Closed nondisplaced fracture of left patella 07/28/2022    Acute medial meniscus tear of left knee 07/28/2022    Osteoarthritis of left knee 07/28/2022    Effusion of left knee 07/28/2022    Encounter for monitoring aromatase inhibitor therapy  2023     Resolved Ambulatory Problems     Diagnosis Date Noted    No Resolved Ambulatory Problems     Past Medical History:   Diagnosis Date    Arthritis     Diabetes type 2, controlled     Hypercholesterolemia     Malignant neoplasm of breast (female)     Peripheral autonomic neuropathy due to diabetes mellitus        PAST SURGICAL HISTORY: None except left breast mastectomy .     OB/GYN HISTORY: Menarche was at age 12, menopause in her late 40s induced by CMF chemotherapy. She is  3, para 2 with one miscarriage. First childbirth was at age 25. She did not breast-feed. Never took hormonal replacement.     ONCOLOGIC HISTORY: History of previous dates can be found in a separate document.   The patient was seen on 2015 after getting a 2nd opinion of Dr. Louis Brooks and seeing her surgeon in [place] again. Her circulating tumor cells were present and we felt this argued against doing surgery and we opted to do radiation to the chest wall. Continue Arimidex for the time being and for progression switch to Faslodex and Ibrance. CT scans of the chest, abdomen, pelvis, and bone scan dated 10/07/2015 showed no evidence of systemic disease and just chest wall disease, which has all respon ded to her hormonal therapy and fatty liver.  Patient seen on 2016 after radiation with a PET scan that shows some radiation lung damage which is PET positive with a low SUV and no other signs of metastatic disease.  Arimidex continued    Her stool sample showed fecal blood and she had a colonoscopy with 3 adenomatous polyps removed - EGD was done and showed H. pylori infection and she was treated for this.  She is at the [facility] exercising with Silver sneakers and losing weight and overall has no complaints      SOCIAL HISTORY: She is . 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 She is a nondrinker.     FAMILY HISTORY: Father  at 59 o f an MI. Mother at 68 of  lung cancer and was a smoker. She has paternal grandmother with breast cancer at age 47, a paternal aunt with breast cancer at age 59 and pancreatic cancer at 88. She has a paternal first cousin with breast cancer at 44, another p a ternal cousin with breast cancer at 44, another cousin with breast cancer at 61, and another paternal first cousin with breast cancer at 66. BRCA testing was done on the 44-year-old cousin and was negative. No history of ovarian cancer. She has a paterna l first cousin with colon cancer.     Review of Systems   Constitutional:  Negative for chills, fatigue and fever.   HENT: Negative.     Respiratory: Negative.  Negative for chest tightness and shortness of breath.    Cardiovascular: Negative.  Negative for chest pain.   Gastrointestinal: Negative.  Negative for constipation, diarrhea, nausea and vomiting.   Genitourinary: Negative.    Musculoskeletal: Negative.  Negative for arthralgias and back pain.   Neurological:  Negative for dizziness and headaches.   Psychiatric/Behavioral:  Negative for sleep disturbance. The patient is not nervous/anxious.    All other systems reviewed and are negative.     A comprehensive 14 point review of systems was performed and was negative except as mentioned.  I have reviewed and confirmed the accuracy of the ROS as documented by the MA/LPN/RN Devin Mattson MD      Current Outpatient Medications on File Prior to Visit   Medication Sig Dispense Refill    amLODIPine (NORVASC) 5 MG tablet Take 1 tablet every day by oral route.      anastrozole (ARIMIDEX) 1 MG tablet TAKE ONE TABLET BY MOUTH EVERY DAY 90 tablet 2    Calcium Carbonate-Vitamin D (CALCIUM-D PO) Take  by mouth Daily.      Cholecalciferol (VITAMIN D) 1000 UNITS tablet Take 2 tablets by mouth.      lisinopril-hydrochlorothiazide (PRINZIDE,ZESTORETIC) 20-12.5 MG per tablet TAKE TWO TABLETS BY MOUTH EVERY DAY FOR BLOOD PRESSURE      metFORMIN ER (GLUCOPHAGE-XR) 500 MG 24 hr tablet Take 1  "tablet by mouth 2 (Two) Times a Day.      vitamin C (ASCORBIC ACID) 250 MG tablet Take 1 tablet by mouth Daily.      Ozempic, 2 MG/DOSE, 8 MG/3ML solution pen-injector Inject 2 mg every week by subcutaneous route. (Patient not taking: Reported on 12/28/2023)      rosuvastatin (CRESTOR) 20 MG tablet Take 1 tablet by mouth Daily. (Patient not taking: Reported on 12/28/2023)      Zinc 50 MG capsule  (Patient not taking: Reported on 12/28/2023)      [DISCONTINUED] rosuvastatin (CRESTOR) 20 MG tablet Take 1 tablet by mouth Every Evening. (Patient not taking: Reported on 12/28/2023)       No current facility-administered medications on file prior to visit.         ALLERGIES:  No Known Allergies    Objective      Vitals:    12/28/23 1542   BP: 136/67   Pulse: 92   Resp: 16   Temp: 98.2 °F (36.8 °C)   TempSrc: Temporal   SpO2: 96%   Weight: 82.9 kg (182 lb 12.8 oz)   Height: 157.5 cm (62.01\")   PainSc: 0-No pain         12/28/2023     3:43 PM   Current Status   ECOG score 0       Physical Exam   Pulmonary/Chest:           GENERAL:  Well-developed, well-nourished in no acute distress.   SKIN:  Warm, dry without rashes, purpura or petechiae.  Atypical nevus in the left lower back  HEAD:  Normocephalic.  NECK:  Supple with good range of motion; no thyromegaly or masses, no JVD.  LYMPHATICS:  No cervical, supraclavicular, axillary or inguinal adenopathy.  CHEST:  Lungs clear to percussion and auscultation. Good airflow.  BREASTS: Right breast with an inverted nipple and no masses-left chest wall with significant radiation skin changes and no residual masses appreciated- telangiectasia in the skin from radiation damage-  CARDIAC:  Regular rate and rhythm without murmurs, rubs or gallops. Normal S1,S2.  ABDOMEN:  Soft, nontender with no organomegaly or masses.  EXTREMITIES:  No clubbing, cyanosis or edema.  NEUROLOGICAL:  Cranial Nerves II-XII grossly intact.  No focal neurological deficits.  PSYCHIATRIC:  Normal affect and mood. "    I have reexamined the patient and the results are consistent with the previously documented exam. Devin Mattson MD       RECENT LABS:  Hematology WBC   Date Value Ref Range Status   12/28/2023 6.92 3.40 - 10.80 10*3/mm3 Final     RBC   Date Value Ref Range Status   12/28/2023 3.86 3.77 - 5.28 10*6/mm3 Final     Hemoglobin   Date Value Ref Range Status   12/28/2023 12.1 12.0 - 15.9 g/dL Final     Hematocrit   Date Value Ref Range Status   12/28/2023 36.2 34.0 - 46.6 % Final     Platelets   Date Value Ref Range Status   12/28/2023 188 140 - 450 10*3/mm3 Final      CT CAP  IMPRESSION:  Stable examination. The equivocal sub-6 mm right upper lobe  pulmonary nodule is unclear if it is new or not and is suspected to be  benign. There is otherwise no new abnormality or convincing evidence for metastatic disease.   This report was finalized on 12/30/2022     BONE SCAN  IMPRESSION;  No interval change or scintigraphic evidence for bony metastaticdisease.   This report was finalized on 12/28/2022    Assessment & Plan   1.  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  Anxiety and depression improved    3.  Significant weight gain with hormonal blockade currently improving with diet and exercise 30 pound weight loss in 2019    4   Numbness in the left upper arm and intrascapular regions?  Related to radiation-better     5.  GENETIC  TESTING NEGATIVE    6.  Atypical nevus left lower back-await Derm evaluation    Plan  1.  Continue Arimidex 1 mg daily  2.Return in 6 months for follow-up with scans  3.   Mammogram annually in 11/24 12/28/2023      CC:

## 2024-05-07 RX ORDER — ANASTROZOLE 1 MG/1
TABLET ORAL
Qty: 90 TABLET | Refills: 2 | Status: SHIPPED | OUTPATIENT
Start: 2024-05-07

## 2024-06-11 ENCOUNTER — TELEPHONE (OUTPATIENT)
Dept: ONCOLOGY | Facility: CLINIC | Age: 74
End: 2024-06-11

## 2024-06-11 NOTE — TELEPHONE ENCOUNTER
"    Caller: Charles Lopez \"Jana\"    Relationship to patient: Self    Best call back number: [phone]     Chief complaint: PATIENT T RESCHEDULE 6/27/24 APPT    Type of visit: LAB AND FU1    Requested date: NEXT AVAILABLE  "

## 2024-06-20 ENCOUNTER — HOSPITAL ENCOUNTER (OUTPATIENT)
Dept: NUCLEAR MEDICINE | Facility: HOSPITAL | Age: 74
Discharge: HOME | End: 2024-06-20
Payer: MEDICARE

## 2024-06-20 ENCOUNTER — APPOINTMENT (OUTPATIENT)
Dept: CT IMAGING | Facility: HOSPITAL | Age: 74
End: 2024-06-20
Payer: MEDICARE

## 2024-06-20 ENCOUNTER — HOSPITAL ENCOUNTER (OUTPATIENT)
Dept: CT IMAGING | Facility: HOSPITAL | Age: 74
Discharge: HOME | End: 2024-06-20
Admitting: INTERNAL MEDICINE
Payer: MEDICARE

## 2024-06-20 DIAGNOSIS — C50.912 CARCINOMA OF LEFT BREAST METASTATIC TO SKIN: ICD-10-CM

## 2024-06-20 DIAGNOSIS — C50.512 MALIGNANT NEOPLASM OF LOWER-OUTER QUADRANT OF LEFT BREAST OF FEMALE, ESTROGEN RECEPTOR POSITIVE: ICD-10-CM

## 2024-06-20 DIAGNOSIS — C79.2 CARCINOMA OF LEFT BREAST METASTATIC TO SKIN: ICD-10-CM

## 2024-06-20 DIAGNOSIS — Z17.0 MALIGNANT NEOPLASM OF LOWER-OUTER QUADRANT OF LEFT BREAST OF FEMALE, ESTROGEN RECEPTOR POSITIVE: ICD-10-CM

## 2024-06-20 LAB — CREAT BLDA-MCNC: 0.9 MG/DL (ref 0.6–1.3)

## 2024-06-20 PROCEDURE — 71260 CT THORAX DX C+: CPT

## 2024-06-20 PROCEDURE — 78306 BONE IMAGING WHOLE BODY: CPT

## 2024-06-20 PROCEDURE — 0 DIATRIZOATE MEGLUMINE & SODIUM PER 1 ML: Performed by: INTERNAL MEDICINE

## 2024-06-20 PROCEDURE — 74177 CT ABD & PELVIS W/CONTRAST: CPT

## 2024-06-20 PROCEDURE — 0 TECHNETIUM MEDRONATE KIT: Performed by: INTERNAL MEDICINE

## 2024-06-20 PROCEDURE — A9503 TC99M MEDRONATE: HCPCS | Performed by: INTERNAL MEDICINE

## 2024-06-20 PROCEDURE — 82565 ASSAY OF CREATININE: CPT

## 2024-06-20 PROCEDURE — 25510000001 IOPAMIDOL 61 % SOLUTION: Performed by: INTERNAL MEDICINE

## 2024-06-20 RX ORDER — TC 99M MEDRONATE 20 MG/10ML
19.4 INJECTION, POWDER, LYOPHILIZED, FOR SOLUTION INTRAVENOUS
Status: COMPLETED | OUTPATIENT
Start: 2024-06-20 | End: 2024-06-20

## 2024-06-20 RX ADMIN — DIATRIZOATE MEGLUMINE AND DIATRIZOATE SODIUM 30 ML: 660; 100 LIQUID ORAL; RECTAL at 08:25

## 2024-06-20 RX ADMIN — Medication 19.4 MILLICURIE: at 08:25

## 2024-06-20 RX ADMIN — IOPAMIDOL 85 ML: 612 INJECTION, SOLUTION INTRAVENOUS at 09:25

## 2024-06-26 ENCOUNTER — OFFICE VISIT (OUTPATIENT)
Dept: ONCOLOGY | Facility: CLINIC | Age: 74
End: 2024-06-26
Payer: MEDICARE

## 2024-06-26 ENCOUNTER — LAB (OUTPATIENT)
Dept: LAB | Facility: HOSPITAL | Age: 74
End: 2024-06-26
Payer: MEDICARE

## 2024-06-26 VITALS
BODY MASS INDEX: 33.16 KG/M2 | HEART RATE: 68 BPM | TEMPERATURE: 98.1 F | WEIGHT: 180.2 LBS | OXYGEN SATURATION: 98 % | DIASTOLIC BLOOD PRESSURE: 84 MMHG | HEIGHT: 62 IN | RESPIRATION RATE: 16 BRPM | SYSTOLIC BLOOD PRESSURE: 143 MMHG

## 2024-06-26 DIAGNOSIS — Z17.0 MALIGNANT NEOPLASM OF LOWER-OUTER QUADRANT OF LEFT BREAST OF FEMALE, ESTROGEN RECEPTOR POSITIVE: ICD-10-CM

## 2024-06-26 DIAGNOSIS — C79.2 CARCINOMA OF LEFT BREAST METASTATIC TO SKIN: ICD-10-CM

## 2024-06-26 DIAGNOSIS — C50.512 MALIGNANT NEOPLASM OF LOWER-OUTER QUADRANT OF LEFT BREAST OF FEMALE, ESTROGEN RECEPTOR POSITIVE: Primary | ICD-10-CM

## 2024-06-26 DIAGNOSIS — C50.512 MALIGNANT NEOPLASM OF LOWER-OUTER QUADRANT OF LEFT BREAST OF FEMALE, ESTROGEN RECEPTOR POSITIVE: ICD-10-CM

## 2024-06-26 DIAGNOSIS — Z17.0 MALIGNANT NEOPLASM OF LOWER-OUTER QUADRANT OF LEFT BREAST OF FEMALE, ESTROGEN RECEPTOR POSITIVE: Primary | ICD-10-CM

## 2024-06-26 DIAGNOSIS — C50.912 CARCINOMA OF LEFT BREAST METASTATIC TO SKIN: ICD-10-CM

## 2024-06-26 LAB
ALBUMIN SERPL-MCNC: 4.4 G/DL (ref 3.5–5.2)
ALBUMIN/GLOB SERPL: 1.3 G/DL
ALP SERPL-CCNC: 77 U/L (ref 39–117)
ALT SERPL W P-5'-P-CCNC: 15 U/L (ref 1–33)
ANION GAP SERPL CALCULATED.3IONS-SCNC: 12.4 MMOL/L (ref 5–15)
AST SERPL-CCNC: 24 U/L (ref 1–32)
BASOPHILS # BLD AUTO: 0.07 10*3/MM3 (ref 0–0.2)
BASOPHILS NFR BLD AUTO: 1.2 % (ref 0–1.5)
BILIRUB SERPL-MCNC: 0.3 MG/DL (ref 0–1.2)
BUN SERPL-MCNC: 21 MG/DL (ref 8–23)
BUN/CREAT SERPL: 25.9 (ref 7–25)
CALCIUM SPEC-SCNC: 10.1 MG/DL (ref 8.6–10.5)
CANCER AG15-3 SERPL-ACNC: 22.3 U/ML
CHLORIDE SERPL-SCNC: 100 MMOL/L (ref 98–107)
CO2 SERPL-SCNC: 27.6 MMOL/L (ref 22–29)
CREAT SERPL-MCNC: 0.81 MG/DL (ref 0.57–1)
DEPRECATED RDW RBC AUTO: 45.1 FL (ref 37–54)
EGFRCR SERPLBLD CKD-EPI 2021: 75.8 ML/MIN/1.73
EOSINOPHIL # BLD AUTO: 0.13 10*3/MM3 (ref 0–0.4)
EOSINOPHIL NFR BLD AUTO: 2.3 % (ref 0.3–6.2)
ERYTHROCYTE [DISTWIDTH] IN BLOOD BY AUTOMATED COUNT: 13.2 % (ref 12.3–15.4)
GLOBULIN UR ELPH-MCNC: 3.4 GM/DL
GLUCOSE SERPL-MCNC: 111 MG/DL (ref 65–99)
HCT VFR BLD AUTO: 41.7 % (ref 34–46.6)
HGB BLD-MCNC: 13.6 G/DL (ref 12–15.9)
IMM GRANULOCYTES # BLD AUTO: 0.01 10*3/MM3 (ref 0–0.05)
IMM GRANULOCYTES NFR BLD AUTO: 0.2 % (ref 0–0.5)
LYMPHOCYTES # BLD AUTO: 2 10*3/MM3 (ref 0.7–3.1)
LYMPHOCYTES NFR BLD AUTO: 34.8 % (ref 19.6–45.3)
MCH RBC QN AUTO: 30.3 PG (ref 26.6–33)
MCHC RBC AUTO-ENTMCNC: 32.6 G/DL (ref 31.5–35.7)
MCV RBC AUTO: 92.9 FL (ref 79–97)
MONOCYTES # BLD AUTO: 0.46 10*3/MM3 (ref 0.1–0.9)
MONOCYTES NFR BLD AUTO: 8 % (ref 5–12)
NEUTROPHILS NFR BLD AUTO: 3.08 10*3/MM3 (ref 1.7–7)
NEUTROPHILS NFR BLD AUTO: 53.5 % (ref 42.7–76)
NRBC BLD AUTO-RTO: 0 /100 WBC (ref 0–0.2)
PLATELET # BLD AUTO: 190 10*3/MM3 (ref 140–450)
PMV BLD AUTO: 9.4 FL (ref 6–12)
POTASSIUM SERPL-SCNC: 4 MMOL/L (ref 3.5–5.2)
PROT SERPL-MCNC: 7.8 G/DL (ref 6–8.5)
RBC # BLD AUTO: 4.49 10*6/MM3 (ref 3.77–5.28)
SODIUM SERPL-SCNC: 140 MMOL/L (ref 136–145)
WBC NRBC COR # BLD AUTO: 5.75 10*3/MM3 (ref 3.4–10.8)

## 2024-06-26 PROCEDURE — 1126F AMNT PAIN NOTED NONE PRSNT: CPT | Performed by: INTERNAL MEDICINE

## 2024-06-26 PROCEDURE — 86300 IMMUNOASSAY TUMOR CA 15-3: CPT | Performed by: INTERNAL MEDICINE

## 2024-06-26 PROCEDURE — 99214 OFFICE O/P EST MOD 30 MIN: CPT | Performed by: INTERNAL MEDICINE

## 2024-06-26 PROCEDURE — 36415 COLL VENOUS BLD VENIPUNCTURE: CPT

## 2024-06-26 PROCEDURE — 80053 COMPREHEN METABOLIC PANEL: CPT

## 2024-06-26 PROCEDURE — 85025 COMPLETE CBC W/AUTO DIFF WBC: CPT

## 2024-06-26 RX ORDER — TIRZEPATIDE 5 MG/.5ML
INJECTION, SOLUTION SUBCUTANEOUS
COMMUNITY

## 2024-06-26 NOTE — PROGRESS NOTES
Subjective     REASONS FOR FOLLOWUP:   Chest wall recurrence of breast cancer, ER/ID positive, HER-2 negative, grade 2 with no evidence of distant spread. Arimidex started in 12/2014.   T1cN0, grade 1, ER/ID positive breast cancer treated with CMF and 5 years of tamoxifen in 1995.   Moderate fatigue from Arimidex.   Response clinically and by CT scan on 03/30/2015.   Faslodex added to Arimidex in July 2015 to optimize response for surgery.   Decision made to forego surgery and do radiation, continue Arimidex in 11/2015.        History of Present Illness  patient is a 65-year-old lady with a locally recurrent breast cancer 24 years from diagnosis currently on Arimidex since December 2014-  9.5  years ago  She is exercising and dieting and her depression and attitude are much better.    The dermatologist scraped off an area on her left chest wall that looked a little different and thankfully this was also benign  Found an atypical mole on her left lower back and it turned out to be melanoma but very superficial thankfully and no other treatment is indicated it was only 0.3 mm    she is tolerating Arimidex without any problems.   Mammogram from November 2023 is negative  CAT scans and bone scans are negative  Her tumor markers is 22  She is up-to-date with scopes  She has no cough or symptoms from her radiation scarring in the left lung apex.    Scans are going to be done annually in July and this the markers go up    Active Ambulatory Problems     Diagnosis Date Noted    Malignant neoplasm of lower-outer quadrant of left female breast 04/08/2016    Carcinoma of left breast metastatic to skin 04/08/2016    Depression 04/18/2016    Rupture of posterior cruciate ligament of left knee 07/28/2022    Closed nondisplaced fracture of left patella 07/28/2022    Acute medial meniscus tear of left knee 07/28/2022    Osteoarthritis of left knee 07/28/2022    Effusion of left knee 07/28/2022    Encounter for monitoring aromatase  inhibitor therapy 2023     Resolved Ambulatory Problems     Diagnosis Date Noted    No Resolved Ambulatory Problems     Past Medical History:   Diagnosis Date    Arthritis     Diabetes type 2, controlled     Hypercholesterolemia     Malignant neoplasm of breast (female)     Peripheral autonomic neuropathy due to diabetes mellitus        PAST SURGICAL HISTORY: None except left breast mastectomy .     OB/GYN HISTORY: Menarche was at age 12, menopause in her late 40s induced by CMF chemotherapy. She is  3, para 2 with one miscarriage. First childbirth was at age 25. She did not breast-feed. Never took hormonal replacement.     ONCOLOGIC HISTORY: History of previous dates can be found in a separate document.   The patient was seen on 2015 after getting a 2nd opinion of Dr. Louis Brooks and seeing her surgeon in [place] again. Her circulating tumor cells were present and we felt this argued against doing surgery and we opted to do radiation to the chest wall. Continue Arimidex for the time being and for progression switch to Faslodex and Ibrance. CT scans of the chest, abdomen, pelvis, and bone scan dated 10/07/2015 showed no evidence of systemic disease and just chest wall disease, which has all respon ded to her hormonal therapy and fatty liver.  Patient seen on 2016 after radiation with a PET scan that shows some radiation lung damage which is PET positive with a low SUV and no other signs of metastatic disease.  Arimidex continued    Her stool sample showed fecal blood and she had a colonoscopy with 3 adenomatous polyps removed - EGD was done and showed H. pylori infection and she was treated for this.  She is at the [facility] exercising with Silver sneakers and losing weight and overall has no complaints      SOCIAL HISTORY: She is . 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 She is a nondrinker.     FAMILY HISTORY: Father  at 59 o f an  MI. Mother at 68 of lung cancer and was a smoker. She has paternal grandmother with breast cancer at age 47, a paternal aunt with breast cancer at age 59 and pancreatic cancer at 88. She has a paternal first cousin with breast cancer at 44, another p a ternal cousin with breast cancer at 44, another cousin with breast cancer at 61, and another paternal first cousin with breast cancer at 66. BRCA testing was done on the 44-year-old cousin and was negative. No history of ovarian cancer. She has a paterna l first cousin with colon cancer.     Review of Systems   Constitutional:  Negative for chills, fatigue and fever.   HENT: Negative.     Respiratory: Negative.  Negative for chest tightness and shortness of breath.    Cardiovascular: Negative.  Negative for chest pain.   Gastrointestinal: Negative.  Negative for constipation, diarrhea, nausea and vomiting.   Genitourinary: Negative.    Musculoskeletal: Negative.  Negative for arthralgias and back pain.   Neurological:  Negative for dizziness and headaches.   Psychiatric/Behavioral:  Negative for sleep disturbance. The patient is not nervous/anxious.    All other systems reviewed and are negative.     A comprehensive 14 point review of systems was performed and was negative except as mentioned.  I have reviewed and confirmed the accuracy of the ROS as documented by the MA/LPN/RN Devin Mattson MD      Current Outpatient Medications on File Prior to Visit   Medication Sig Dispense Refill    anastrozole (ARIMIDEX) 1 MG tablet TAKE ONE TABLET BY MOUTH EVERY DAY 90 tablet 2    Calcium Carbonate-Vitamin D (CALCIUM-D PO) Take  by mouth Daily.      Cholecalciferol (VITAMIN D) 1000 UNITS tablet Take 2 tablets by mouth.      lisinopril-hydrochlorothiazide (PRINZIDE,ZESTORETIC) 20-12.5 MG per tablet TAKE TWO TABLETS BY MOUTH EVERY DAY FOR BLOOD PRESSURE      metFORMIN ER (GLUCOPHAGE-XR) 500 MG 24 hr tablet Take 1 tablet by mouth 2 (Two) Times a Day.      Mounjaro 5  "MG/0.5ML solution pen-injector pen ADMINISTER 5 MG UNDER THE SKIN EVERY WEEK AS DIRECTED      rosuvastatin (CRESTOR) 20 MG tablet Take 1 tablet by mouth Daily.      vitamin C (ASCORBIC ACID) 250 MG tablet Take 1 tablet by mouth Daily.      amLODIPine (NORVASC) 5 MG tablet Take 1 tablet every day by oral route.      Ozempic, 2 MG/DOSE, 8 MG/3ML solution pen-injector Inject 2 mg every week by subcutaneous route. (Patient not taking: Reported on 12/28/2023)      Zinc 50 MG capsule  (Patient not taking: Reported on 12/28/2023)       No current facility-administered medications on file prior to visit.         ALLERGIES:  No Known Allergies    Objective      Vitals:    06/26/24 1021   BP: 143/84   Pulse: 68   Resp: 16   Temp: 98.1 °F (36.7 °C)   TempSrc: Oral   SpO2: 98%   Weight: 81.7 kg (180 lb 3.2 oz)   Height: 157 cm (61.81\")   PainSc: 0-No pain         6/26/2024    10:23 AM   Current Status   ECOG score 0       Physical Exam   Pulmonary/Chest:           GENERAL:  Well-developed, well-nourished in no acute distress.   SKIN:  Warm, dry without rashes, purpura or petechiae.  Atypical nevus in the left lower back  HEAD:  Normocephalic.  NECK:  Supple with good range of motion; no thyromegaly or masses, no JVD.  LYMPHATICS:  No cervical, supraclavicular, axillary or inguinal adenopathy.  CHEST:  Lungs clear to percussion and auscultation. Good airflow.  BREASTS: Right breast with an inverted nipple and no masses-left chest wall with significant radiation skin changes and no residual masses appreciated- telangiectasia in the skin from radiation damage-  CARDIAC:  Regular rate and rhythm without murmurs, rubs or gallops. Normal S1,S2.  ABDOMEN:  Soft, nontender with no organomegaly or masses.  EXTREMITIES:  No clubbing, cyanosis or edema.  NEUROLOGICAL:  Cranial Nerves II-XII grossly intact.  No focal neurological deficits.  PSYCHIATRIC:  Normal affect and mood.    I have reexamined the patient and the results are " consistent with the previously documented exam. Devin Mattson MD       RECENT LABS:  Hematology WBC   Date Value Ref Range Status   06/26/2024 5.75 3.40 - 10.80 10*3/mm3 Final     RBC   Date Value Ref Range Status   06/26/2024 4.49 3.77 - 5.28 10*6/mm3 Final     Hemoglobin   Date Value Ref Range Status   06/26/2024 13.6 12.0 - 15.9 g/dL Final     Hematocrit   Date Value Ref Range Status   06/26/2024 41.7 34.0 - 46.6 % Final     Platelets   Date Value Ref Range Status   06/26/2024 190 140 - 450 10*3/mm3 Final      CT CAP  IMPRESSION:  Stable examination. The equivocal sub-6 mm right upper lobe  pulmonary nodule is unclear if it is new or not and is suspected to be  benign. There is otherwise no new abnormality or convincing evidence for metastatic disease.   This report was finalized on 12/30/2022     BONE SCAN  IMPRESSION;  No interval change or scintigraphic evidence for bony metastaticdisease.   This report was finalized on 12/28/2022    Assessment & Plan   1.  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  Anxiety and depression improved    3.  Significant weight gain with hormonal blockade currently improving with diet and exercise 30 pound weight loss in 2019    4   Numbness in the left upper arm and intrascapular regions?  Related to radiation-better     5.  GENETIC  TESTING NEGATIVE    6.  Atypical nevus left lower back-await Derm evaluation-  Melanoma 0.3 mm  wide local resection    Plan  1.  Continue Arimidex 1 mg daily  2.Return in 6 months  for follow-up with scans next [place]  3.  Mammogram annually in 11/24  I spent 36 total minutes, face-to-face, caring for Charles today. Greater than 50% of this time involved counseling and/or coordination of care as documented within this note.      6/26/2024      CC:

## 2024-12-04 ENCOUNTER — APPOINTMENT (OUTPATIENT)
Dept: WOMENS IMAGING | Facility: HOSPITAL | Age: 74
End: 2024-12-04
Payer: MEDICARE

## 2024-12-04 PROCEDURE — 77063 BREAST TOMOSYNTHESIS BI: CPT | Performed by: RADIOLOGY

## 2024-12-04 PROCEDURE — 77067 SCR MAMMO BI INCL CAD: CPT | Performed by: RADIOLOGY

## 2025-01-02 ENCOUNTER — LAB (OUTPATIENT)
Dept: LAB | Facility: HOSPITAL | Age: 75
End: 2025-01-02
Payer: MEDICARE

## 2025-01-02 ENCOUNTER — OFFICE VISIT (OUTPATIENT)
Dept: ONCOLOGY | Facility: CLINIC | Age: 75
End: 2025-01-02
Payer: MEDICARE

## 2025-01-02 VITALS
WEIGHT: 161 LBS | SYSTOLIC BLOOD PRESSURE: 129 MMHG | BODY MASS INDEX: 29.63 KG/M2 | DIASTOLIC BLOOD PRESSURE: 73 MMHG | TEMPERATURE: 98.6 F | HEART RATE: 77 BPM | OXYGEN SATURATION: 97 %

## 2025-01-02 DIAGNOSIS — C50.512 MALIGNANT NEOPLASM OF LOWER-OUTER QUADRANT OF LEFT BREAST OF FEMALE, ESTROGEN RECEPTOR POSITIVE: ICD-10-CM

## 2025-01-02 DIAGNOSIS — C50.512 MALIGNANT NEOPLASM OF LOWER-OUTER QUADRANT OF LEFT BREAST OF FEMALE, ESTROGEN RECEPTOR POSITIVE: Primary | ICD-10-CM

## 2025-01-02 DIAGNOSIS — Z17.0 MALIGNANT NEOPLASM OF LOWER-OUTER QUADRANT OF LEFT BREAST OF FEMALE, ESTROGEN RECEPTOR POSITIVE: Primary | ICD-10-CM

## 2025-01-02 DIAGNOSIS — Z17.0 MALIGNANT NEOPLASM OF LOWER-OUTER QUADRANT OF LEFT BREAST OF FEMALE, ESTROGEN RECEPTOR POSITIVE: ICD-10-CM

## 2025-01-02 LAB
ALBUMIN SERPL-MCNC: 4.2 G/DL (ref 3.5–5.2)
ALBUMIN/GLOB SERPL: 1.4 G/DL
ALP SERPL-CCNC: 80 U/L (ref 39–117)
ALT SERPL W P-5'-P-CCNC: 22 U/L (ref 1–33)
ANION GAP SERPL CALCULATED.3IONS-SCNC: 11.2 MMOL/L (ref 5–15)
AST SERPL-CCNC: 26 U/L (ref 1–32)
BASOPHILS # BLD AUTO: 0.05 10*3/MM3 (ref 0–0.2)
BASOPHILS NFR BLD AUTO: 0.8 % (ref 0–1.5)
BILIRUB SERPL-MCNC: 0.4 MG/DL (ref 0–1.2)
BUN SERPL-MCNC: 20 MG/DL (ref 8–23)
BUN/CREAT SERPL: 22.7 (ref 7–25)
CALCIUM SPEC-SCNC: 9.4 MG/DL (ref 8.6–10.5)
CANCER AG15-3 SERPL-ACNC: 19.6 U/ML
CHLORIDE SERPL-SCNC: 99 MMOL/L (ref 98–107)
CO2 SERPL-SCNC: 28.8 MMOL/L (ref 22–29)
CREAT SERPL-MCNC: 0.88 MG/DL (ref 0.57–1)
DEPRECATED RDW RBC AUTO: 47.1 FL (ref 37–54)
EGFRCR SERPLBLD CKD-EPI 2021: 68.2 ML/MIN/1.73
EOSINOPHIL # BLD AUTO: 0.06 10*3/MM3 (ref 0–0.4)
EOSINOPHIL NFR BLD AUTO: 0.9 % (ref 0.3–6.2)
ERYTHROCYTE [DISTWIDTH] IN BLOOD BY AUTOMATED COUNT: 13.9 % (ref 12.3–15.4)
GLOBULIN UR ELPH-MCNC: 3.1 GM/DL
GLUCOSE SERPL-MCNC: 146 MG/DL (ref 65–99)
HCT VFR BLD AUTO: 40.4 % (ref 34–46.6)
HGB BLD-MCNC: 12.9 G/DL (ref 12–15.9)
IMM GRANULOCYTES # BLD AUTO: 0.03 10*3/MM3 (ref 0–0.05)
IMM GRANULOCYTES NFR BLD AUTO: 0.5 % (ref 0–0.5)
LYMPHOCYTES # BLD AUTO: 1.69 10*3/MM3 (ref 0.7–3.1)
LYMPHOCYTES NFR BLD AUTO: 26.7 % (ref 19.6–45.3)
MCH RBC QN AUTO: 29.4 PG (ref 26.6–33)
MCHC RBC AUTO-ENTMCNC: 31.9 G/DL (ref 31.5–35.7)
MCV RBC AUTO: 92 FL (ref 79–97)
MONOCYTES # BLD AUTO: 0.41 10*3/MM3 (ref 0.1–0.9)
MONOCYTES NFR BLD AUTO: 6.5 % (ref 5–12)
NEUTROPHILS NFR BLD AUTO: 4.1 10*3/MM3 (ref 1.7–7)
NEUTROPHILS NFR BLD AUTO: 64.6 % (ref 42.7–76)
NRBC BLD AUTO-RTO: 0 /100 WBC (ref 0–0.2)
PLATELET # BLD AUTO: 189 10*3/MM3 (ref 140–450)
PMV BLD AUTO: 9 FL (ref 6–12)
POTASSIUM SERPL-SCNC: 3.7 MMOL/L (ref 3.5–5.2)
PROT SERPL-MCNC: 7.3 G/DL (ref 6–8.5)
RBC # BLD AUTO: 4.39 10*6/MM3 (ref 3.77–5.28)
SODIUM SERPL-SCNC: 139 MMOL/L (ref 136–145)
WBC NRBC COR # BLD AUTO: 6.34 10*3/MM3 (ref 3.4–10.8)

## 2025-01-02 PROCEDURE — 36415 COLL VENOUS BLD VENIPUNCTURE: CPT

## 2025-01-02 PROCEDURE — 85025 COMPLETE CBC W/AUTO DIFF WBC: CPT

## 2025-01-02 PROCEDURE — 80053 COMPREHEN METABOLIC PANEL: CPT

## 2025-01-02 PROCEDURE — 86300 IMMUNOASSAY TUMOR CA 15-3: CPT | Performed by: INTERNAL MEDICINE

## 2025-01-02 RX ORDER — MECLIZINE HCL 12.5 MG 12.5 MG/1
12.5 TABLET ORAL AS NEEDED
COMMUNITY
Start: 2024-08-26

## 2025-01-02 NOTE — PROGRESS NOTES
Subjective     REASONS FOR FOLLOWUP:   Chest wall recurrence of breast cancer, ER/KY positive, HER-2 negative, grade 2 with no evidence of distant spread. Arimidex started in 12/2014.   T1cN0, grade 1, ER/KY positive breast cancer treated with CMF and 5 years of tamoxifen in 1995.   Moderate fatigue from Arimidex.   Response clinically and by CT scan on 03/30/2015.   Faslodex added to Arimidex in July 2015 to optimize response for surgery.   Decision made to forego surgery and do radiation, continue Arimidex in 11/2015.        History of Present Illness  patient is a 65-year-old lady with a locally recurrent breast cancer 24 years from diagnosis currently on Arimidex since December 2014-  10 years ago  She is exercising and dieting and her depression and attitude are much better.  She tried Mounjaro but did not lose much weight on it and then started at specific diet and is lost 20 pounds    The dermatologist scraped off an area on her left chest wall that looked a little different and thankfully this was also benign  Found an atypical mole on her left lower back and it turned out to be melanoma but very superficial thankfully and no other treatment is indicated it was only 0.3 mm    she is tolerating Arimidex without any problems.   Mammogram from November 2024 is negative  CAT scans and bone scans are negative from June 2024  Her tumor markers is 22  She is up-to-date with scopes  She has no cough or symptoms from her radiation scarring in the left lung apex.    Scans are going to be done annually in July and this the markers go up    Active Ambulatory Problems     Diagnosis Date Noted    Malignant neoplasm of lower-outer quadrant of left female breast 04/08/2016    Carcinoma of left breast metastatic to skin 04/08/2016    Depression 04/18/2016    Rupture of posterior cruciate ligament of left knee 07/28/2022    Closed nondisplaced fracture of left patella 07/28/2022    Acute medial meniscus tear of left knee  2022    Osteoarthritis of left knee 2022    Effusion of left knee 2022    Encounter for monitoring aromatase inhibitor therapy 2023     Resolved Ambulatory Problems     Diagnosis Date Noted    No Resolved Ambulatory Problems     Past Medical History:   Diagnosis Date    Arthritis     Diabetes type 2, controlled     Hypercholesterolemia     Malignant neoplasm of breast (female)     Peripheral autonomic neuropathy due to diabetes mellitus        PAST SURGICAL HISTORY: None except left breast mastectomy .     OB/GYN HISTORY: Menarche was at age 12, menopause in her late 40s induced by CMF chemotherapy. She is  3, para 2 with one miscarriage. First childbirth was at age 25. She did not breast-feed. Never took hormonal replacement.     ONCOLOGIC HISTORY: History of previous dates can be found in a separate document.   The patient was seen on 2015 after getting a 2nd opinion of Dr. Louis Broosk and seeing her surgeon in [place] again. Her circulating tumor cells were present and we felt this argued against doing surgery and we opted to do radiation to the chest wall. Continue Arimidex for the time being and for progression switch to Faslodex and Ibrance. CT scans of the chest, abdomen, pelvis, and bone scan dated 10/07/2015 showed no evidence of systemic disease and just chest wall disease, which has all respon ded to her hormonal therapy and fatty liver.  Patient seen on 2016 after radiation with a PET scan that shows some radiation lung damage which is PET positive with a low SUV and no other signs of metastatic disease.  Arimidex continued    Her stool sample showed fecal blood and she had a colonoscopy with 3 adenomatous polyps removed - EGD was done and showed H. pylori infection and she was treated for this.  She is at the [employer] exercising with Silver sneakers and losing weight and overall has no complaints      SOCIAL HISTORY: She is . 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 She smoked a pack a day for 24 years and quit in . She is a nondrinker.     FAMILY HISTORY: Father  at 59 o f an MI. Mother at 68 of lung cancer and was a smoker. She has paternal grandmother with breast cancer at age 47, a paternal aunt with breast cancer at age 59 and pancreatic cancer at 88. She has a paternal first cousin with breast cancer at 44, another p a ternal cousin with breast cancer at 44, another cousin with breast cancer at 61, and another paternal first cousin with breast cancer at 66. BRCA testing was done on the 44-year-old cousin and was negative. No history of ovarian cancer. She has a paterna l first cousin with colon cancer.     Review of Systems   Constitutional:  Negative for chills, fatigue and fever.   HENT: Negative.     Respiratory: Negative.  Negative for chest tightness and shortness of breath.    Cardiovascular: Negative.  Negative for chest pain.   Gastrointestinal: Negative.  Negative for constipation, diarrhea, nausea and vomiting.   Genitourinary: Negative.    Musculoskeletal: Negative.  Negative for arthralgias and back pain.   Neurological:  Negative for dizziness and headaches.   Psychiatric/Behavioral:  Negative for sleep disturbance. The patient is not nervous/anxious.    All other systems reviewed and are negative.     A comprehensive 14 point review of systems was performed and was negative except as mentioned.  I have reviewed and confirmed the accuracy of the ROS as documented by the MA/LPN/RN Devin Mattson MD      Current Outpatient Medications on File Prior to Visit   Medication Sig Dispense Refill    anastrozole (ARIMIDEX) 1 MG tablet TAKE ONE TABLET BY MOUTH EVERY DAY 90 tablet 2    Calcium Carbonate-Vitamin D (CALCIUM-D PO) Take  by mouth Daily.      Cholecalciferol (VITAMIN D) 1000 UNITS tablet Take 2 tablets by mouth.      lisinopril-hydrochlorothiazide (PRINZIDE,ZESTORETIC) 20-12.5 MG per tablet TAKE TWO TABLETS BY MOUTH EVERY DAY FOR BLOOD  PRESSURE      meclizine (ANTIVERT) 12.5 MG tablet Take 1 tablet by mouth As Needed for Dizziness.      metFORMIN ER (GLUCOPHAGE-XR) 500 MG 24 hr tablet Take 1 tablet by mouth 2 (Two) Times a Day.      Mounjaro 5 MG/0.5ML solution pen-injector pen ADMINISTER 5 MG UNDER THE SKIN EVERY WEEK AS DIRECTED      rosuvastatin (CRESTOR) 20 MG tablet Take 1 tablet by mouth Daily.      vitamin C (ASCORBIC ACID) 250 MG tablet Take 1 tablet by mouth Daily.       No current facility-administered medications on file prior to visit.         ALLERGIES:  No Known Allergies    Objective      Vitals:    01/02/25 1248   BP: 129/73   Pulse: 77   Temp: 98.6 °F (37 °C)   TempSrc: Oral   SpO2: 97%   Weight: 73 kg (161 lb)   PainSc: 0-No pain         1/2/2025    12:51 PM   Current Status   ECOG score 0       Physical Exam   Pulmonary/Chest:           GENERAL:  Well-developed, well-nourished in no acute distress.   SKIN:  Warm, dry without rashes, purpura or petechiae.  Atypical nevus in the left lower back  HEAD:  Normocephalic.  NECK:  Supple with good range of motion; no thyromegaly or masses, no JVD.  LYMPHATICS:  No cervical, supraclavicular, axillary or inguinal adenopathy.  CHEST:  Lungs clear to percussion and auscultation. Good airflow.  BREASTS: Right breast with an inverted nipple and no masses-left chest wall with significant radiation skin changes and no residual masses appreciated- telangiectasia in the skin from radiation damage-  CARDIAC:  Regular rate and rhythm without murmurs, rubs or gallops. Normal S1,S2.  ABDOMEN:  Soft, nontender with no organomegaly or masses.  EXTREMITIES:  No clubbing, cyanosis or edema.  NEUROLOGICAL:  Cranial Nerves II-XII grossly intact.  No focal neurological deficits.  PSYCHIATRIC:  Normal affect and mood.    I have reexamined the patient and the results are consistent with the previously documented exam. Devin Mattson MD       RECENT LABS:  Hematology WBC   Date Value Ref Range Status    01/02/2025 6.34 3.40 - 10.80 10*3/mm3 Final     RBC   Date Value Ref Range Status   01/02/2025 4.39 3.77 - 5.28 10*6/mm3 Final     Hemoglobin   Date Value Ref Range Status   01/02/2025 12.9 12.0 - 15.9 g/dL Final     Hematocrit   Date Value Ref Range Status   01/02/2025 40.4 34.0 - 46.6 % Final     Platelets   Date Value Ref Range Status   01/02/2025 189 140 - 450 10*3/mm3 Final      CT CAP  IMPRESSION:  Stable examination. The equivocal sub-6 mm right upper lobe  pulmonary nodule is unclear if it is new or not and is suspected to be  benign. There is otherwise no new abnormality or convincing evidence for metastatic disease.   This report was finalized on 12/30/2022     BONE SCAN  IMPRESSION;  No interval change or scintigraphic evidence for bony metastaticdisease.   This report was finalized on 12/28/2022    Assessment & Plan   1.  Recurrent breast cancer to the left chest wall only-treated with aromatase inhibitors(Arimidex plus Faslodex initially and then Arimidex) plus radiation with a negative PET scan in 6/16  Circulating tumor cells positive done in [place] at diagnosis  Telangiectasia the skin of the left chest wall due to radiation  Scans [facility] as of 12/21-Arimidex continued  Clinically [facility] as of 7/23 with RU lobe nodule felt to be a vascular anomaly  Clinically [facility]O in 12/23  CAT scans negative in 6/24 continues Arimidex  Tolerating Arimidex well as of 1/25    2.  Anxiety and depression improved    3.  Significant weight gain with hormonal blockade currently improving with diet and exercise 30 pound weight loss in 2019    4   Numbness in the left upper arm and intrascapular regions?  Related to radiation-better     5.  GENETIC  TESTING NEGATIVE    6.  Atypical nevus left lower back-await Derm evaluation-  Melanoma 0.3 mm  wide local resection    Plan  1.  Continue Arimidex 1 mg daily  2.Return in 6 months for follow-up with scans next June  3.  Mammogram annually in 11/25 1/2/2025      CC:

## 2025-02-28 RX ORDER — ANASTROZOLE 1 MG/1
TABLET ORAL
Qty: 90 TABLET | Refills: 3 | Status: SHIPPED | OUTPATIENT
Start: 2025-02-28

## 2025-06-05 VITALS
SYSTOLIC BLOOD PRESSURE: 117 MMHG | HEART RATE: 75 BPM | HEART RATE: 77 BPM | DIASTOLIC BLOOD PRESSURE: 38 MMHG | DIASTOLIC BLOOD PRESSURE: 35 MMHG | SYSTOLIC BLOOD PRESSURE: 92 MMHG | RESPIRATION RATE: 18 BRPM | HEIGHT: 62 IN | OXYGEN SATURATION: 99 % | SYSTOLIC BLOOD PRESSURE: 134 MMHG | RESPIRATION RATE: 22 BRPM | OXYGEN SATURATION: 94 % | SYSTOLIC BLOOD PRESSURE: 98 MMHG | RESPIRATION RATE: 11 BRPM | DIASTOLIC BLOOD PRESSURE: 42 MMHG | SYSTOLIC BLOOD PRESSURE: 82 MMHG | HEART RATE: 72 BPM | DIASTOLIC BLOOD PRESSURE: 46 MMHG | HEART RATE: 70 BPM | SYSTOLIC BLOOD PRESSURE: 77 MMHG | SYSTOLIC BLOOD PRESSURE: 95 MMHG | DIASTOLIC BLOOD PRESSURE: 114 MMHG | RESPIRATION RATE: 15 BRPM | OXYGEN SATURATION: 100 % | WEIGHT: 150 LBS | SYSTOLIC BLOOD PRESSURE: 122 MMHG | RESPIRATION RATE: 16 BRPM | DIASTOLIC BLOOD PRESSURE: 57 MMHG | RESPIRATION RATE: 17 BRPM | HEART RATE: 73 BPM | DIASTOLIC BLOOD PRESSURE: 63 MMHG | RESPIRATION RATE: 14 BRPM | DIASTOLIC BLOOD PRESSURE: 39 MMHG | DIASTOLIC BLOOD PRESSURE: 33 MMHG | HEART RATE: 69 BPM | HEART RATE: 71 BPM | OXYGEN SATURATION: 98 % | OXYGEN SATURATION: 97 % | RESPIRATION RATE: 13 BRPM | SYSTOLIC BLOOD PRESSURE: 88 MMHG | DIASTOLIC BLOOD PRESSURE: 34 MMHG | SYSTOLIC BLOOD PRESSURE: 136 MMHG | HEART RATE: 65 BPM | TEMPERATURE: 97.3 F | SYSTOLIC BLOOD PRESSURE: 90 MMHG | SYSTOLIC BLOOD PRESSURE: 110 MMHG | RESPIRATION RATE: 12 BRPM | SYSTOLIC BLOOD PRESSURE: 87 MMHG | SYSTOLIC BLOOD PRESSURE: 170 MMHG | DIASTOLIC BLOOD PRESSURE: 61 MMHG | RESPIRATION RATE: 21 BRPM

## 2025-06-09 ENCOUNTER — AMBULATORY SURGICAL CENTER (AMBULATORY)
Dept: URBAN - METROPOLITAN AREA SURGERY 17 | Facility: SURGERY | Age: 75
End: 2025-06-09
Payer: MEDICARE

## 2025-06-09 ENCOUNTER — OFFICE (AMBULATORY)
Dept: URBAN - METROPOLITAN AREA PATHOLOGY 4 | Facility: PATHOLOGY | Age: 75
End: 2025-06-09
Payer: MEDICARE

## 2025-06-09 DIAGNOSIS — D12.3 BENIGN NEOPLASM OF TRANSVERSE COLON: ICD-10-CM

## 2025-06-09 DIAGNOSIS — K57.30 DIVERTICULOSIS OF LARGE INTESTINE WITHOUT PERFORATION OR ABS: ICD-10-CM

## 2025-06-09 DIAGNOSIS — K64.0 FIRST DEGREE HEMORRHOIDS: ICD-10-CM

## 2025-06-09 DIAGNOSIS — D12.0 BENIGN NEOPLASM OF CECUM: ICD-10-CM

## 2025-06-09 DIAGNOSIS — Z86.0101 PERSONAL HISTORY OF ADENOMATOUS AND SERRATED COLON POLYPS: ICD-10-CM

## 2025-06-09 DIAGNOSIS — Z09 ENCOUNTER FOR FOLLOW-UP EXAMINATION AFTER COMPLETED TREATMEN: ICD-10-CM

## 2025-06-09 DIAGNOSIS — D12.2 BENIGN NEOPLASM OF ASCENDING COLON: ICD-10-CM

## 2025-06-09 PROCEDURE — 88305 TISSUE EXAM BY PATHOLOGIST: CPT | Performed by: PATHOLOGY

## 2025-06-09 PROCEDURE — 45385 COLONOSCOPY W/LESION REMOVAL: CPT | Mod: PT | Performed by: INTERNAL MEDICINE

## 2025-06-09 NOTE — SERVICEHPINOTES
Ms. Jonel Suárez is a 75 YO F with PMH of Breast ca, colon polyps, DM, HTN, HLD presents for open access surviellance colonoscopy 
br
br Last colonoscopy on 6.2.2020 with removal of two tubular adenoma. 
br
br 
She denies nausea, vomiting or abd pain. No melena or hematochezia. No weight lost. No diarrhea. 
br
brShe reports colon cancer in her cousin and her cousin's father. Mother with Lung cancer. br
br

## 2025-06-12 ENCOUNTER — HOSPITAL ENCOUNTER (OUTPATIENT)
Dept: NUCLEAR MEDICINE | Facility: HOSPITAL | Age: 75
Discharge: HOME | End: 2025-06-12
Payer: MEDICARE

## 2025-06-12 ENCOUNTER — HOSPITAL ENCOUNTER (OUTPATIENT)
Dept: CT IMAGING | Facility: HOSPITAL | Age: 75
Discharge: HOME | End: 2025-06-12
Admitting: INTERNAL MEDICINE
Payer: MEDICARE

## 2025-06-12 DIAGNOSIS — Z17.0 MALIGNANT NEOPLASM OF LOWER-OUTER QUADRANT OF LEFT BREAST OF FEMALE, ESTROGEN RECEPTOR POSITIVE: ICD-10-CM

## 2025-06-12 DIAGNOSIS — C50.512 MALIGNANT NEOPLASM OF LOWER-OUTER QUADRANT OF LEFT BREAST OF FEMALE, ESTROGEN RECEPTOR POSITIVE: ICD-10-CM

## 2025-06-12 LAB — CREAT BLDA-MCNC: 0.8 MG/DL (ref 0.6–1.3)

## 2025-06-12 PROCEDURE — A9503 TC99M MEDRONATE: HCPCS | Performed by: INTERNAL MEDICINE

## 2025-06-12 PROCEDURE — 25510000001 IOPAMIDOL 61 % SOLUTION: Performed by: INTERNAL MEDICINE

## 2025-06-12 PROCEDURE — 71260 CT THORAX DX C+: CPT

## 2025-06-12 PROCEDURE — 82565 ASSAY OF CREATININE: CPT

## 2025-06-12 PROCEDURE — 78306 BONE IMAGING WHOLE BODY: CPT

## 2025-06-12 PROCEDURE — 74177 CT ABD & PELVIS W/CONTRAST: CPT

## 2025-06-12 PROCEDURE — 25510000002 DIATRIZOATE MEGLUMINE & SODIUM PER 1 ML: Performed by: INTERNAL MEDICINE

## 2025-06-12 PROCEDURE — 34310000005 TECHNETIUM MEDRONATE KIT: Performed by: INTERNAL MEDICINE

## 2025-06-12 RX ORDER — TC 99M MEDRONATE 20 MG/10ML
22.8 INJECTION, POWDER, LYOPHILIZED, FOR SOLUTION INTRAVENOUS
Status: COMPLETED | OUTPATIENT
Start: 2025-06-12 | End: 2025-06-12

## 2025-06-12 RX ORDER — IOPAMIDOL 612 MG/ML
100 INJECTION, SOLUTION INTRAVASCULAR
Status: COMPLETED | OUTPATIENT
Start: 2025-06-12 | End: 2025-06-12

## 2025-06-12 RX ORDER — DIATRIZOATE MEGLUMINE AND DIATRIZOATE SODIUM 660; 100 MG/ML; MG/ML
30 SOLUTION ORAL; RECTAL
Status: COMPLETED | OUTPATIENT
Start: 2025-06-12 | End: 2025-06-12

## 2025-06-12 RX ADMIN — DIATRIZOATE MEGLUMINE AND DIATRIZOATE SODIUM 30 ML: 660; 100 LIQUID ORAL; RECTAL at 07:27

## 2025-06-12 RX ADMIN — IOPAMIDOL 85 ML: 612 INJECTION, SOLUTION INTRAVENOUS at 08:12

## 2025-06-12 RX ADMIN — Medication 22.8 MILLICURIE: at 07:42

## 2025-06-19 ENCOUNTER — LAB (OUTPATIENT)
Dept: LAB | Facility: HOSPITAL | Age: 75
End: 2025-06-19
Payer: MEDICARE

## 2025-06-19 ENCOUNTER — OFFICE VISIT (OUTPATIENT)
Dept: ONCOLOGY | Facility: CLINIC | Age: 75
End: 2025-06-19
Payer: MEDICARE

## 2025-06-19 VITALS
SYSTOLIC BLOOD PRESSURE: 132 MMHG | WEIGHT: 150.9 LBS | HEART RATE: 73 BPM | DIASTOLIC BLOOD PRESSURE: 87 MMHG | RESPIRATION RATE: 14 BRPM | TEMPERATURE: 98.7 F | HEIGHT: 62 IN | OXYGEN SATURATION: 97 % | BODY MASS INDEX: 27.77 KG/M2

## 2025-06-19 DIAGNOSIS — C50.512 MALIGNANT NEOPLASM OF LOWER-OUTER QUADRANT OF LEFT BREAST OF FEMALE, ESTROGEN RECEPTOR POSITIVE: ICD-10-CM

## 2025-06-19 DIAGNOSIS — Z17.0 MALIGNANT NEOPLASM OF LOWER-OUTER QUADRANT OF LEFT BREAST OF FEMALE, ESTROGEN RECEPTOR POSITIVE: Primary | ICD-10-CM

## 2025-06-19 DIAGNOSIS — Z79.811 ENCOUNTER FOR MONITORING AROMATASE INHIBITOR THERAPY: ICD-10-CM

## 2025-06-19 DIAGNOSIS — Z51.81 ENCOUNTER FOR MONITORING AROMATASE INHIBITOR THERAPY: ICD-10-CM

## 2025-06-19 DIAGNOSIS — Z17.0 MALIGNANT NEOPLASM OF LOWER-OUTER QUADRANT OF LEFT BREAST OF FEMALE, ESTROGEN RECEPTOR POSITIVE: ICD-10-CM

## 2025-06-19 DIAGNOSIS — C50.912 CARCINOMA OF LEFT BREAST METASTATIC TO SKIN: ICD-10-CM

## 2025-06-19 DIAGNOSIS — C50.512 MALIGNANT NEOPLASM OF LOWER-OUTER QUADRANT OF LEFT BREAST OF FEMALE, ESTROGEN RECEPTOR POSITIVE: Primary | ICD-10-CM

## 2025-06-19 DIAGNOSIS — C79.2 CARCINOMA OF LEFT BREAST METASTATIC TO SKIN: ICD-10-CM

## 2025-06-19 LAB
ALBUMIN SERPL-MCNC: 4.4 G/DL (ref 3.5–5.2)
ALBUMIN/GLOB SERPL: 1.5 G/DL
ALP SERPL-CCNC: 87 U/L (ref 39–117)
ALT SERPL W P-5'-P-CCNC: 21 U/L (ref 1–33)
ANION GAP SERPL CALCULATED.3IONS-SCNC: 9.5 MMOL/L (ref 5–15)
AST SERPL-CCNC: 27 U/L (ref 1–32)
BASOPHILS # BLD AUTO: 0.05 10*3/MM3 (ref 0–0.2)
BASOPHILS NFR BLD AUTO: 0.9 % (ref 0–1.5)
BILIRUB SERPL-MCNC: 0.3 MG/DL (ref 0–1.2)
BUN SERPL-MCNC: 15.6 MG/DL (ref 8–23)
BUN/CREAT SERPL: 20 (ref 7–25)
CALCIUM SPEC-SCNC: 9.7 MG/DL (ref 8.6–10.5)
CHLORIDE SERPL-SCNC: 102 MMOL/L (ref 98–107)
CO2 SERPL-SCNC: 27.5 MMOL/L (ref 22–29)
CREAT SERPL-MCNC: 0.78 MG/DL (ref 0.57–1)
DEPRECATED RDW RBC AUTO: 43.6 FL (ref 37–54)
EGFRCR SERPLBLD CKD-EPI 2021: 78.8 ML/MIN/1.73
EOSINOPHIL # BLD AUTO: 0.11 10*3/MM3 (ref 0–0.4)
EOSINOPHIL NFR BLD AUTO: 1.9 % (ref 0.3–6.2)
ERYTHROCYTE [DISTWIDTH] IN BLOOD BY AUTOMATED COUNT: 13.1 % (ref 12.3–15.4)
GLOBULIN UR ELPH-MCNC: 3 GM/DL
GLUCOSE SERPL-MCNC: 95 MG/DL (ref 65–99)
HCT VFR BLD AUTO: 39.3 % (ref 34–46.6)
HGB BLD-MCNC: 13.1 G/DL (ref 12–15.9)
IMM GRANULOCYTES # BLD AUTO: 0.02 10*3/MM3 (ref 0–0.05)
IMM GRANULOCYTES NFR BLD AUTO: 0.3 % (ref 0–0.5)
LYMPHOCYTES # BLD AUTO: 2.06 10*3/MM3 (ref 0.7–3.1)
LYMPHOCYTES NFR BLD AUTO: 35.6 % (ref 19.6–45.3)
MCH RBC QN AUTO: 30.8 PG (ref 26.6–33)
MCHC RBC AUTO-ENTMCNC: 33.3 G/DL (ref 31.5–35.7)
MCV RBC AUTO: 92.5 FL (ref 79–97)
MONOCYTES # BLD AUTO: 0.59 10*3/MM3 (ref 0.1–0.9)
MONOCYTES NFR BLD AUTO: 10.2 % (ref 5–12)
NEUTROPHILS NFR BLD AUTO: 2.96 10*3/MM3 (ref 1.7–7)
NEUTROPHILS NFR BLD AUTO: 51.1 % (ref 42.7–76)
NRBC BLD AUTO-RTO: 0 /100 WBC (ref 0–0.2)
PLATELET # BLD AUTO: 189 10*3/MM3 (ref 140–450)
PMV BLD AUTO: 9.5 FL (ref 6–12)
POTASSIUM SERPL-SCNC: 3.8 MMOL/L (ref 3.5–5.2)
PROT SERPL-MCNC: 7.4 G/DL (ref 6–8.5)
RBC # BLD AUTO: 4.25 10*6/MM3 (ref 3.77–5.28)
SODIUM SERPL-SCNC: 139 MMOL/L (ref 136–145)
WBC NRBC COR # BLD AUTO: 5.79 10*3/MM3 (ref 3.4–10.8)

## 2025-06-19 PROCEDURE — 36415 COLL VENOUS BLD VENIPUNCTURE: CPT

## 2025-06-19 PROCEDURE — 85025 COMPLETE CBC W/AUTO DIFF WBC: CPT

## 2025-06-19 PROCEDURE — 80053 COMPREHEN METABOLIC PANEL: CPT

## 2025-06-19 NOTE — PROGRESS NOTES
Subjective     REASONS FOR FOLLOWUP:   Chest wall recurrence of breast cancer, ER/IN positive, HER-2 negative, grade 2 with no evidence of distant spread. Arimidex started in 12/2014.   T1cN0, grade 1, ER/IN positive breast cancer treated with CMF and 5 years of tamoxifen in 1995.   Moderate fatigue from Arimidex.   Response clinically and by CT scan on 03/30/2015.   Faslodex added to Arimidex in July 2015 to optimize response for surgery.   Decision made to forego surgery and do radiation, continue Arimidex in 11/2015.        History of Present Illness  patient is a 65-year-old lady with a locally recurrent breast cancer 24 years from diagnosis currently on Arimidex since December 2014-  10.5 years ago  She is exercising and dieting and her depression and attitude are much better.  She tried Mounjaro but did not lose much weight on it and then started at specific diet and is lost 20 pounds    The dermatologist scraped off an area on her left chest wall that looked a little different and thankfully this was also benign  Found an atypical mole on her left lower back and it turned out to be melanoma but very superficial thankfully and no other treatment is indicated it was only 0.3 mm    she is tolerating Arimidex without any problems.   Mammogram from November 2024 is negative  CAT scans and bone scans are negative from June 2025 except for 3 mm nodule in the right lung which needs follow-up in 3 months and continued slow progression of her thoracic and abdominal aneurysm for which I will refer her to thoracic surgery  Her tumor markers is 22  She is up-to-date with scopes but her most recent one 2 weeks ago had 9 adenomatous polyps and she needs annual follow-up for now    She has no cough or symptoms from her radiation scarring in the left lung apex.        Active Ambulatory Problems     Diagnosis Date Noted    Malignant neoplasm of lower-outer quadrant of left female breast 04/08/2016    Carcinoma of left breast  metastatic to skin 2016    Depression 2016    Rupture of posterior cruciate ligament of left knee 2022    Closed nondisplaced fracture of left patella 2022    Acute medial meniscus tear of left knee 2022    Osteoarthritis of left knee 2022    Effusion of left knee 2022    Encounter for monitoring aromatase inhibitor therapy 2023     Resolved Ambulatory Problems     Diagnosis Date Noted    No Resolved Ambulatory Problems     Past Medical History:   Diagnosis Date    Arthritis     Diabetes type 2, controlled     Hypercholesterolemia     Malignant neoplasm of breast (female)     Peripheral autonomic neuropathy due to diabetes mellitus        PAST SURGICAL HISTORY: None except left breast mastectomy .     OB/GYN HISTORY: Menarche was at age 12, menopause in her late 40s induced by CMF chemotherapy. She is  3, para 2 with one miscarriage. First childbirth was at age 25. She did not breast-feed. Never took hormonal replacement.     ONCOLOGIC HISTORY: History of previous dates can be found in a separate document.   The patient was seen on 2015 after getting a 2nd opinion of Dr. Louis Brooks and seeing her surgeon in [place] again. Her circulating tumor cells were present and we felt this argued against doing surgery and we opted to do radiation to the chest wall. Continue Arimidex for the time being and for progression switch to Faslodex and Ibrance. CT scans of the chest, abdomen, pelvis, and bone scan dated 10/07/2015 showed no evidence of systemic disease and just chest wall disease, which has all respon ded to her hormonal therapy and fatty liver.  Patient seen on 2016 after radiation with a PET scan that shows some radiation lung damage which is PET positive with a low SUV and no other signs of metastatic disease.  Arimidex continued    Her stool sample showed fecal blood and she had a colonoscopy with 3 adenomatous polyps removed - EGD was  done and showed H. pylori infection and she was treated for this.  She is at the [facility] exercising with Silver sneakers and losing weight and overall has no complaints      SOCIAL HISTORY: She is . 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 She is a nondrinker.     FAMILY HISTORY: Father  at 59 o f an MI. Mother at 68 of lung cancer and was a smoker. She has paternal grandmother with breast cancer at age 47, a paternal aunt with breast cancer at age 59 and pancreatic cancer at 88. She has a paternal first cousin with breast cancer at 44, another p a ternal cousin with breast cancer at 44, another cousin with breast cancer at 61, and another paternal first cousin with breast cancer at 66. BRCA testing was done on the 44-year-old cousin and was negative. No history of ovarian cancer. She has a paterna l first cousin with colon cancer.     Review of Systems   Constitutional:  Negative for chills, fatigue and fever.   HENT: Negative.     Respiratory: Negative.  Negative for chest tightness and shortness of breath.    Cardiovascular: Negative.  Negative for chest pain.   Gastrointestinal: Negative.  Negative for constipation, diarrhea, nausea and vomiting.   Genitourinary: Negative.    Musculoskeletal: Negative.  Negative for arthralgias and back pain.   Neurological:  Negative for dizziness and headaches.   Psychiatric/Behavioral:  Negative for sleep disturbance. The patient is not nervous/anxious.    All other systems reviewed and are negative.     A comprehensive 14 point review of systems was performed and was negative except as mentioned.  I have reviewed and confirmed the accuracy of the ROS as documented by the MA/CHRISTOPHER/RN Devin Mattson MD      Current Outpatient Medications on File Prior to Visit   Medication Sig Dispense Refill    anastrozole (ARIMIDEX) 1 MG tablet TAKE ONE TABLET BY MOUTH EVERY DAY 90 tablet 3    Calcium Carbonate-Vitamin D (CALCIUM-D PO)  "Take  by mouth Daily.      Cholecalciferol (VITAMIN D) 1000 UNITS tablet Take 2 tablets by mouth.      lisinopril-hydrochlorothiazide (PRINZIDE,ZESTORETIC) 20-12.5 MG per tablet TAKE TWO TABLETS BY MOUTH EVERY DAY FOR BLOOD PRESSURE      meclizine (ANTIVERT) 12.5 MG tablet Take 1 tablet by mouth As Needed for Dizziness.      metFORMIN ER (GLUCOPHAGE-XR) 500 MG 24 hr tablet Take 1 tablet by mouth 2 (Two) Times a Day.      Mounjaro 5 MG/0.5ML solution pen-injector pen ADMINISTER 5 MG UNDER THE SKIN EVERY WEEK AS DIRECTED      rosuvastatin (CRESTOR) 20 MG tablet Take 1 tablet by mouth Daily.      vitamin C (ASCORBIC ACID) 250 MG tablet Take 1 tablet by mouth Daily.       No current facility-administered medications on file prior to visit.         ALLERGIES:  No Known Allergies    Objective      Vitals:    06/19/25 0927   BP: 132/87   Pulse: 73   Resp: 14   Temp: 98.7 °F (37.1 °C)   TempSrc: Temporal   SpO2: 97%   Weight: 68.4 kg (150 lb 14.4 oz)   Height: 157 cm (61.81\")   PainSc: 0-No pain         6/19/2025     9:24 AM   Current Status   ECOG score 0       Physical Exam   Pulmonary/Chest:           GENERAL:  Well-developed, well-nourished in no acute distress.   SKIN:  Warm, dry without rashes, purpura or petechiae.    HEAD:  Normocephalic.  NECK:  Supple with good range of motion; no thyromegaly or masses, no JVD.  LYMPHATICS:  No cervical, supraclavicular, axillary or inguinal adenopathy.  CHEST:  Lungs clear to percussion and auscultation. Good airflow.  BREASTS: Right breast with an inverted nipple and no masses-left chest wall with significant radiation skin changes and no residual masses appreciated- telangiectasia in the skin from radiation damage-  CARDIAC:  Regular rate and rhythm without murmurs, rubs or gallops. Normal S1,S2.  ABDOMEN:  Soft, nontender with no organomegaly or masses.  EXTREMITIES:  No clubbing, cyanosis or edema.  NEUROLOGICAL:  Cranial Nerves II-XII grossly intact.  No focal neurological " deficits.  PSYCHIATRIC:  Normal affect and mood.    I have reexamined the patient and the results are consistent with the previously documented exam. Devin Mattson MD       RECENT LABS:  Hematology WBC   Date Value Ref Range Status   06/19/2025 5.79 3.40 - 10.80 10*3/mm3 Final     RBC   Date Value Ref Range Status   06/19/2025 4.25 3.77 - 5.28 10*6/mm3 Final     Hemoglobin   Date Value Ref Range Status   06/19/2025 13.1 12.0 - 15.9 g/dL Final     Hematocrit   Date Value Ref Range Status   06/19/2025 39.3 34.0 - 46.6 % Final     Platelets   Date Value Ref Range Status   06/19/2025 189 140 - 450 10*3/mm3 Final      CT CAP  IMPRESSION:  Stable examination. The equivocal sub-6 mm right upper lobe  pulmonary nodule is unclear if it is new or not and is suspected to be  benign. There is otherwise no new abnormality or convincing evidence for metastatic disease.   This report was finalized on 12/30/2022     BONE SCAN  IMPRESSION;  No interval change or scintigraphic evidence for bony metastaticdisease.   This report was finalized on 12/28/2022    Assessment & Plan   1.  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  Anxiety and depression improved    3.  Significant weight gain with hormonal blockade currently improving with diet and exercise 30 pound weight loss in 2019    4   Numbness in the  left upper arm and intrascapular regions?  Related to radiation-better     5.  GENETIC  TESTING NEGATIVE    6.  Atypical nevus left lower back-await Derm evaluation-  Melanoma 0.3 mm  wide local resection 1/24    7. 9 adenomatous polyps  6/25    Plan  1.  Continue Arimidex 1 mg daily  2.Return in 3 months for follow-up with scans   3.  Mammogram annually in 11/25  4. Refer to Dr Morris for aneurysm      6/19/2025      CC:

## 2025-08-06 ENCOUNTER — OFFICE VISIT (OUTPATIENT)
Dept: CARDIAC SURGERY | Facility: CLINIC | Age: 75
End: 2025-08-06
Payer: MEDICARE

## 2025-08-06 VITALS
HEART RATE: 76 BPM | WEIGHT: 150 LBS | RESPIRATION RATE: 18 BRPM | BODY MASS INDEX: 27.6 KG/M2 | TEMPERATURE: 97.5 F | OXYGEN SATURATION: 98 % | HEIGHT: 62 IN

## 2025-08-06 DIAGNOSIS — I77.819 AORTIC ECTASIA: Primary | ICD-10-CM

## 2025-08-06 PROCEDURE — 99204 OFFICE O/P NEW MOD 45 MIN: CPT

## 2025-08-06 PROCEDURE — 1160F RVW MEDS BY RX/DR IN RCRD: CPT

## 2025-08-06 PROCEDURE — 1159F MED LIST DOCD IN RCRD: CPT
